# Patient Record
Sex: MALE | Race: WHITE | Employment: FULL TIME | URBAN - METROPOLITAN AREA
[De-identification: names, ages, dates, MRNs, and addresses within clinical notes are randomized per-mention and may not be internally consistent; named-entity substitution may affect disease eponyms.]

---

## 2017-02-03 DIAGNOSIS — I10 ESSENTIAL HYPERTENSION: ICD-10-CM

## 2017-02-03 RX ORDER — LISINOPRIL 5 MG/1
5 TABLET ORAL DAILY
Qty: 90 TABLET | Refills: 0 | Status: SHIPPED | OUTPATIENT
Start: 2017-02-03 | End: 2017-05-12 | Stop reason: SDUPTHER

## 2017-04-07 ENCOUNTER — OFFICE VISIT (OUTPATIENT)
Dept: FAMILY MEDICINE CLINIC | Age: 52
End: 2017-04-07
Payer: COMMERCIAL

## 2017-04-07 VITALS
HEART RATE: 93 BPM | HEIGHT: 71 IN | DIASTOLIC BLOOD PRESSURE: 64 MMHG | WEIGHT: 197 LBS | SYSTOLIC BLOOD PRESSURE: 108 MMHG | RESPIRATION RATE: 20 BRPM | TEMPERATURE: 97.4 F | BODY MASS INDEX: 27.58 KG/M2

## 2017-04-07 DIAGNOSIS — J01.40 ACUTE NON-RECURRENT PANSINUSITIS: Primary | ICD-10-CM

## 2017-04-07 DIAGNOSIS — M25.562 CHRONIC PAIN OF LEFT KNEE: ICD-10-CM

## 2017-04-07 DIAGNOSIS — G89.29 CHRONIC PAIN OF LEFT KNEE: ICD-10-CM

## 2017-04-07 DIAGNOSIS — J20.9 ACUTE BRONCHITIS, UNSPECIFIED ORGANISM: ICD-10-CM

## 2017-04-07 DIAGNOSIS — Z12.11 SCREEN FOR COLON CANCER: ICD-10-CM

## 2017-04-07 PROCEDURE — 99214 OFFICE O/P EST MOD 30 MIN: CPT | Performed by: NURSE PRACTITIONER

## 2017-04-07 RX ORDER — GUAIFENESIN AND CODEINE PHOSPHATE 100; 10 MG/5ML; MG/5ML
5 SOLUTION ORAL EVERY 4 HOURS PRN
Qty: 90 ML | Refills: 0 | Status: SHIPPED | OUTPATIENT
Start: 2017-04-07 | End: 2017-04-14

## 2017-04-07 RX ORDER — PREDNISONE 10 MG/1
TABLET ORAL
Qty: 18 TABLET | Refills: 0 | Status: SHIPPED | OUTPATIENT
Start: 2017-04-07 | End: 2017-04-17

## 2017-04-07 RX ORDER — GUAIFENESIN 600 MG/1
600 TABLET, EXTENDED RELEASE ORAL 2 TIMES DAILY
Qty: 40 TABLET | Refills: 0 | Status: SHIPPED | OUTPATIENT
Start: 2017-04-07 | End: 2017-05-12 | Stop reason: SDUPTHER

## 2017-04-07 RX ORDER — CLARITHROMYCIN 500 MG/1
500 TABLET, COATED ORAL 2 TIMES DAILY
Qty: 20 TABLET | Refills: 0 | Status: SHIPPED | OUTPATIENT
Start: 2017-04-07 | End: 2017-06-05 | Stop reason: SDUPTHER

## 2017-04-09 ASSESSMENT — ENCOUNTER SYMPTOMS
ABDOMINAL PAIN: 0
SINUS PRESSURE: 1
COUGH: 1
NAUSEA: 0
SHORTNESS OF BREATH: 1
RHINORRHEA: 1
VOMITING: 0

## 2017-05-12 ENCOUNTER — OFFICE VISIT (OUTPATIENT)
Dept: FAMILY MEDICINE CLINIC | Age: 52
End: 2017-05-12
Payer: COMMERCIAL

## 2017-05-12 VITALS
TEMPERATURE: 97.3 F | HEART RATE: 83 BPM | DIASTOLIC BLOOD PRESSURE: 85 MMHG | OXYGEN SATURATION: 98 % | SYSTOLIC BLOOD PRESSURE: 132 MMHG | BODY MASS INDEX: 27.28 KG/M2 | WEIGHT: 195.6 LBS

## 2017-05-12 DIAGNOSIS — Z12.11 SCREEN FOR COLON CANCER: ICD-10-CM

## 2017-05-12 DIAGNOSIS — R53.83 FATIGUE, UNSPECIFIED TYPE: ICD-10-CM

## 2017-05-12 DIAGNOSIS — I10 ESSENTIAL HYPERTENSION: Primary | ICD-10-CM

## 2017-05-12 DIAGNOSIS — Z23 NEED FOR PNEUMOCOCCAL VACCINE: ICD-10-CM

## 2017-05-12 DIAGNOSIS — Z13.220 SCREENING CHOLESTEROL LEVEL: ICD-10-CM

## 2017-05-12 DIAGNOSIS — Z11.4 SCREENING FOR HIV (HUMAN IMMUNODEFICIENCY VIRUS): ICD-10-CM

## 2017-05-12 DIAGNOSIS — F17.200 TOBACCO DEPENDENCE: ICD-10-CM

## 2017-05-12 DIAGNOSIS — J42 CHRONIC BRONCHITIS, UNSPECIFIED CHRONIC BRONCHITIS TYPE (HCC): ICD-10-CM

## 2017-05-12 LAB
CONTROL: YES
HEMOCCULT STL QL: NEGATIVE

## 2017-05-12 PROCEDURE — 82274 ASSAY TEST FOR BLOOD FECAL: CPT | Performed by: NURSE PRACTITIONER

## 2017-05-12 PROCEDURE — 99214 OFFICE O/P EST MOD 30 MIN: CPT | Performed by: NURSE PRACTITIONER

## 2017-05-12 RX ORDER — LISINOPRIL 5 MG/1
5 TABLET ORAL DAILY
Qty: 90 TABLET | Refills: 1 | Status: SHIPPED | OUTPATIENT
Start: 2017-05-12 | End: 2017-10-24 | Stop reason: SDUPTHER

## 2017-05-12 RX ORDER — VARENICLINE TARTRATE 25 MG
KIT ORAL
Qty: 53 TABLET | Refills: 0 | Status: SHIPPED | OUTPATIENT
Start: 2017-05-12 | End: 2018-08-08 | Stop reason: ALTCHOICE

## 2017-05-12 RX ORDER — OMEPRAZOLE 20 MG/1
CAPSULE, DELAYED RELEASE ORAL
Qty: 90 CAPSULE | Refills: 1 | Status: SHIPPED | OUTPATIENT
Start: 2017-05-12 | End: 2017-10-24 | Stop reason: SDUPTHER

## 2017-05-14 ASSESSMENT — ENCOUNTER SYMPTOMS
COUGH: 1
SHORTNESS OF BREATH: 0
NAUSEA: 0
ABDOMINAL PAIN: 0

## 2017-06-05 ENCOUNTER — TELEPHONE (OUTPATIENT)
Dept: FAMILY MEDICINE CLINIC | Age: 52
End: 2017-06-05

## 2017-06-05 DIAGNOSIS — J01.40 ACUTE NON-RECURRENT PANSINUSITIS: ICD-10-CM

## 2017-06-05 DIAGNOSIS — J20.9 ACUTE BRONCHITIS, UNSPECIFIED ORGANISM: ICD-10-CM

## 2017-06-05 RX ORDER — CLARITHROMYCIN 500 MG/1
500 TABLET, COATED ORAL 2 TIMES DAILY
Qty: 20 TABLET | Refills: 0 | Status: SHIPPED | OUTPATIENT
Start: 2017-06-05 | End: 2018-09-02 | Stop reason: SDUPTHER

## 2017-06-05 RX ORDER — AZITHROMYCIN 250 MG/1
TABLET, FILM COATED ORAL
Qty: 1 PACKET | Refills: 0 | Status: SHIPPED | OUTPATIENT
Start: 2017-06-05 | End: 2017-06-15

## 2017-06-19 ENCOUNTER — TELEPHONE (OUTPATIENT)
Dept: FAMILY MEDICINE CLINIC | Age: 52
End: 2017-06-19

## 2017-06-20 RX ORDER — ALBUTEROL SULFATE 90 UG/1
2 AEROSOL, METERED RESPIRATORY (INHALATION) 4 TIMES DAILY
Qty: 3 INHALER | Refills: 2 | Status: SHIPPED | OUTPATIENT
Start: 2017-06-20 | End: 2017-09-05 | Stop reason: SDUPTHER

## 2017-09-06 RX ORDER — ALBUTEROL SULFATE 90 UG/1
2 AEROSOL, METERED RESPIRATORY (INHALATION) 4 TIMES DAILY
Qty: 3 INHALER | Refills: 2 | Status: SHIPPED | OUTPATIENT
Start: 2017-09-06 | End: 2018-08-08 | Stop reason: ALTCHOICE

## 2017-10-24 DIAGNOSIS — I10 ESSENTIAL HYPERTENSION: ICD-10-CM

## 2017-10-24 RX ORDER — OMEPRAZOLE 20 MG/1
CAPSULE, DELAYED RELEASE ORAL
Qty: 90 CAPSULE | Refills: 1 | Status: SHIPPED | OUTPATIENT
Start: 2017-10-24 | End: 2018-05-30 | Stop reason: SDUPTHER

## 2017-10-24 RX ORDER — LISINOPRIL 5 MG/1
5 TABLET ORAL DAILY
Qty: 90 TABLET | Refills: 1 | Status: SHIPPED | OUTPATIENT
Start: 2017-10-24 | End: 2018-05-30 | Stop reason: SDUPTHER

## 2018-01-26 ENCOUNTER — OFFICE VISIT (OUTPATIENT)
Dept: PODIATRY | Age: 53
End: 2018-01-26
Payer: COMMERCIAL

## 2018-01-26 VITALS — HEIGHT: 71 IN | HEART RATE: 103 BPM | SYSTOLIC BLOOD PRESSURE: 122 MMHG | DIASTOLIC BLOOD PRESSURE: 83 MMHG

## 2018-01-26 DIAGNOSIS — L98.9 BENIGN SKIN LESION: ICD-10-CM

## 2018-01-26 DIAGNOSIS — B07.0 PLANTAR VERRUCA: Primary | ICD-10-CM

## 2018-01-26 DIAGNOSIS — M79.672 LEFT FOOT PAIN: ICD-10-CM

## 2018-01-26 DIAGNOSIS — M79.671 RIGHT FOOT PAIN: ICD-10-CM

## 2018-01-26 PROCEDURE — 4004F PT TOBACCO SCREEN RCVD TLK: CPT | Performed by: PODIATRIST

## 2018-01-26 PROCEDURE — 99203 OFFICE O/P NEW LOW 30 MIN: CPT | Performed by: PODIATRIST

## 2018-01-26 PROCEDURE — G8421 BMI NOT CALCULATED: HCPCS | Performed by: PODIATRIST

## 2018-01-26 PROCEDURE — 3017F COLORECTAL CA SCREEN DOC REV: CPT | Performed by: PODIATRIST

## 2018-01-26 PROCEDURE — 17110 DESTRUCTION B9 LES UP TO 14: CPT | Performed by: PODIATRIST

## 2018-01-26 PROCEDURE — G8427 DOCREV CUR MEDS BY ELIG CLIN: HCPCS | Performed by: PODIATRIST

## 2018-01-26 PROCEDURE — G8484 FLU IMMUNIZE NO ADMIN: HCPCS | Performed by: PODIATRIST

## 2018-01-26 ASSESSMENT — ENCOUNTER SYMPTOMS
NAUSEA: 0
SHORTNESS OF BREATH: 0
DIARRHEA: 0
BACK PAIN: 0
COLOR CHANGE: 0

## 2018-01-26 NOTE — PROGRESS NOTES
by mouth every 6 hours as needed for Pain. 9/22/14  Yes Magdalena Adan MD   fluticasone (FLONASE) 50 MCG/ACT nasal spray 1 spray by Nasal route daily. 4/9/14  Yes Macky Landau, DO   varenicline (CHANTIX STARTING MONTH PAK) 0.5 MG X 11 & 1 MG X 42 tablet Take by mouth. 5/12/17   Meron Andrade, RONIT   guaiFENesin (MUCINEX) 600 MG SR tablet Take 1 tablet by mouth 2 times daily. 2/18/15   Pérez Joy MD       History reviewed. No pertinent surgical history. History reviewed. No pertinent family history. Social History   Substance Use Topics    Smoking status: Current Every Day Smoker     Packs/day: 0.50     Years: 20.00     Types: Cigarettes    Smokeless tobacco: Never Used    Alcohol use 0.0 oz/week      Comment: 8 beers per day       Review of Systems   Constitutional: Negative for activity change, appetite change, chills, diaphoresis, fatigue and fever. Respiratory: Negative for shortness of breath. Cardiovascular: Negative for leg swelling. Gastrointestinal: Negative for diarrhea and nausea. Endocrine: Negative for cold intolerance, heat intolerance and polyuria. Musculoskeletal: Positive for arthralgias. Negative for back pain, gait problem, joint swelling and myalgias. Skin: Negative for color change, pallor, rash and wound. Allergic/Immunologic: Negative for environmental allergies and food allergies. Neurological: Negative for dizziness, weakness, light-headedness and numbness. Hematological: Does not bruise/bleed easily. Psychiatric/Behavioral: Negative for behavioral problems, confusion and self-injury. The patient is not nervous/anxious. Vitals:   Vitals:    01/26/18 1256   BP: 122/83   Pulse: 103       General: AAO x 3 in NAD. Integument: There are no rashes, ulcers, or breaks in the skin noted to the bilateral lower extremities.      There is no induration, subcutaneous nodules, or tightening of the skin noted to the bilateral.     Toenails 1-5 of the right foot do not

## 2018-04-30 ENCOUNTER — OFFICE VISIT (OUTPATIENT)
Dept: PODIATRY | Age: 53
End: 2018-04-30
Payer: COMMERCIAL

## 2018-04-30 VITALS
BODY MASS INDEX: 27.44 KG/M2 | WEIGHT: 196 LBS | SYSTOLIC BLOOD PRESSURE: 137 MMHG | HEART RATE: 80 BPM | HEIGHT: 71 IN | DIASTOLIC BLOOD PRESSURE: 81 MMHG

## 2018-04-30 DIAGNOSIS — M79.672 LEFT FOOT PAIN: ICD-10-CM

## 2018-04-30 DIAGNOSIS — B07.0 PLANTAR VERRUCA: Primary | ICD-10-CM

## 2018-04-30 DIAGNOSIS — L84 CORNS AND CALLOSITIES: ICD-10-CM

## 2018-04-30 DIAGNOSIS — M79.671 RIGHT FOOT PAIN: ICD-10-CM

## 2018-04-30 PROCEDURE — 11056 PARNG/CUTG B9 HYPRKR LES 2-4: CPT | Performed by: PODIATRIST

## 2018-04-30 PROCEDURE — 17110 DESTRUCTION B9 LES UP TO 14: CPT | Performed by: PODIATRIST

## 2018-04-30 RX ORDER — IPRATROPIUM BROMIDE AND ALBUTEROL SULFATE 2.5; .5 MG/3ML; MG/3ML
SOLUTION RESPIRATORY (INHALATION)
COMMUNITY
Start: 2018-03-25 | End: 2018-08-08 | Stop reason: SDUPTHER

## 2018-04-30 ASSESSMENT — ENCOUNTER SYMPTOMS
SHORTNESS OF BREATH: 0
COLOR CHANGE: 0
DIARRHEA: 0
BACK PAIN: 0
NAUSEA: 0

## 2018-05-30 DIAGNOSIS — K21.9 GASTROESOPHAGEAL REFLUX DISEASE WITHOUT ESOPHAGITIS: Primary | ICD-10-CM

## 2018-05-30 DIAGNOSIS — I10 ESSENTIAL HYPERTENSION: ICD-10-CM

## 2018-05-30 RX ORDER — OMEPRAZOLE 20 MG/1
CAPSULE, DELAYED RELEASE ORAL
Qty: 90 CAPSULE | Refills: 1 | Status: SHIPPED | OUTPATIENT
Start: 2018-05-30 | End: 2018-12-05 | Stop reason: SDUPTHER

## 2018-05-30 RX ORDER — LISINOPRIL 5 MG/1
5 TABLET ORAL DAILY
Qty: 90 TABLET | Refills: 1 | Status: SHIPPED | OUTPATIENT
Start: 2018-05-30 | End: 2018-12-05 | Stop reason: SDUPTHER

## 2018-06-25 DIAGNOSIS — J20.9 ACUTE BRONCHITIS, UNSPECIFIED ORGANISM: ICD-10-CM

## 2018-06-25 DIAGNOSIS — J44.9 CHRONIC OBSTRUCTIVE PULMONARY DISEASE, UNSPECIFIED COPD TYPE (HCC): Primary | ICD-10-CM

## 2018-06-25 NOTE — TELEPHONE ENCOUNTER
Please let the patient know to  prescription from pharmacy. Requested Prescriptions     Signed Prescriptions Disp Refills    mometasone-formoterol (DULERA) 200-5 MCG/ACT inhaler 13 g 3     Sig: Inhale 2 puffs into the lungs 2 times daily     Authorizing Provider: Rashid Meyer [address] [facility], [address] [place] [address]  Phone: [phone] Fax: [phone]      Thank you!         FYI    Future Appointments  Date Time Provider Sha Porter   8/8/2018 4:30 PM Sahara Nettles MD fp sc [id number]       Controlled Substances Monitoring:

## 2018-06-25 NOTE — TELEPHONE ENCOUNTER
Patient made soonest apt with you, 8/8. He is hoping you can give him refill on his Rayma Ryan until he sees you. Please advise.

## 2018-08-08 ENCOUNTER — OFFICE VISIT (OUTPATIENT)
Dept: FAMILY MEDICINE CLINIC | Age: 53
End: 2018-08-08
Payer: COMMERCIAL

## 2018-08-08 VITALS
BODY MASS INDEX: 27.66 KG/M2 | HEART RATE: 74 BPM | DIASTOLIC BLOOD PRESSURE: 88 MMHG | TEMPERATURE: 97.2 F | HEIGHT: 71 IN | SYSTOLIC BLOOD PRESSURE: 138 MMHG | OXYGEN SATURATION: 97 % | WEIGHT: 197.6 LBS

## 2018-08-08 DIAGNOSIS — M25.469 PAIN AND SWELLING OF KNEE, UNSPECIFIED LATERALITY: ICD-10-CM

## 2018-08-08 DIAGNOSIS — G89.29 CHRONIC PAIN OF BOTH KNEES: Primary | ICD-10-CM

## 2018-08-08 DIAGNOSIS — H83.3X1 NOISE-INDUCED HEARING LOSS OF RIGHT EAR, UNSPECIFIED HEARING STATUS ON CONTRALATERAL SIDE: ICD-10-CM

## 2018-08-08 DIAGNOSIS — M25.561 CHRONIC PAIN OF BOTH KNEES: Primary | ICD-10-CM

## 2018-08-08 DIAGNOSIS — Z13.6 SCREENING FOR CARDIOVASCULAR CONDITION: ICD-10-CM

## 2018-08-08 DIAGNOSIS — J44.0 CHRONIC OBSTRUCTIVE PULMONARY DISEASE WITH ACUTE LOWER RESPIRATORY INFECTION (HCC): ICD-10-CM

## 2018-08-08 DIAGNOSIS — G57.93 NEUROPATHY OF BOTH FEET: ICD-10-CM

## 2018-08-08 DIAGNOSIS — Z12.11 SCREENING FOR COLON CANCER: ICD-10-CM

## 2018-08-08 DIAGNOSIS — M25.569 PAIN AND SWELLING OF KNEE, UNSPECIFIED LATERALITY: ICD-10-CM

## 2018-08-08 DIAGNOSIS — Z11.4 ENCOUNTER FOR SCREENING FOR HIV: ICD-10-CM

## 2018-08-08 DIAGNOSIS — J44.9 COPD WITH ASTHMA (HCC): ICD-10-CM

## 2018-08-08 DIAGNOSIS — M25.562 CHRONIC PAIN OF BOTH KNEES: Primary | ICD-10-CM

## 2018-08-08 DIAGNOSIS — I10 ESSENTIAL HYPERTENSION: ICD-10-CM

## 2018-08-08 DIAGNOSIS — Z87.891 PERSONAL HISTORY OF NICOTINE DEPENDENCE: ICD-10-CM

## 2018-08-08 PROCEDURE — G8427 DOCREV CUR MEDS BY ELIG CLIN: HCPCS | Performed by: FAMILY MEDICINE

## 2018-08-08 PROCEDURE — G8926 SPIRO NO PERF OR DOC: HCPCS | Performed by: FAMILY MEDICINE

## 2018-08-08 PROCEDURE — 3023F SPIROM DOC REV: CPT | Performed by: FAMILY MEDICINE

## 2018-08-08 PROCEDURE — G8419 CALC BMI OUT NRM PARAM NOF/U: HCPCS | Performed by: FAMILY MEDICINE

## 2018-08-08 PROCEDURE — 99214 OFFICE O/P EST MOD 30 MIN: CPT | Performed by: FAMILY MEDICINE

## 2018-08-08 PROCEDURE — 4004F PT TOBACCO SCREEN RCVD TLK: CPT | Performed by: FAMILY MEDICINE

## 2018-08-08 PROCEDURE — 3017F COLORECTAL CA SCREEN DOC REV: CPT | Performed by: FAMILY MEDICINE

## 2018-08-08 RX ORDER — ACETAMINOPHEN 500 MG
500 TABLET ORAL EVERY 6 HOURS PRN
Qty: 120 TABLET | Refills: 3 | Status: SHIPPED | OUTPATIENT
Start: 2018-08-08 | End: 2020-01-15 | Stop reason: SDUPTHER

## 2018-08-08 RX ORDER — LIDOCAINE 40 MG/G
CREAM TOPICAL
Qty: 120 G | Refills: 3 | Status: SHIPPED | OUTPATIENT
Start: 2018-08-08 | End: 2020-07-29

## 2018-08-08 RX ORDER — VARENICLINE TARTRATE 1 MG/1
1 TABLET, FILM COATED ORAL 2 TIMES DAILY
Qty: 60 TABLET | Refills: 3 | Status: SHIPPED | OUTPATIENT
Start: 2018-08-08 | End: 2019-03-04

## 2018-08-08 RX ORDER — IPRATROPIUM BROMIDE AND ALBUTEROL SULFATE 2.5; .5 MG/3ML; MG/3ML
1 SOLUTION RESPIRATORY (INHALATION) EVERY 6 HOURS PRN
Qty: 360 ML | Refills: 3 | Status: SHIPPED | OUTPATIENT
Start: 2018-08-08 | End: 2020-01-15 | Stop reason: SDUPTHER

## 2018-08-08 RX ORDER — VARENICLINE TARTRATE 25 MG
KIT ORAL
Qty: 53 EACH | Refills: 0 | Status: SHIPPED | OUTPATIENT
Start: 2018-08-08 | End: 2019-03-04

## 2018-08-08 RX ORDER — ALBUTEROL SULFATE 90 UG/1
2 AEROSOL, METERED RESPIRATORY (INHALATION) EVERY 6 HOURS PRN
Qty: 18 G | Refills: 0
Start: 2018-08-08 | End: 2018-10-01 | Stop reason: SDUPTHER

## 2018-08-08 ASSESSMENT — ENCOUNTER SYMPTOMS
SHORTNESS OF BREATH: 1
CHEST TIGHTNESS: 0
ABDOMINAL DISTENTION: 0
ABDOMINAL PAIN: 0
CONSTIPATION: 0
COUGH: 1
VOMITING: 0
DIARRHEA: 0
NAUSEA: 0
WHEEZING: 1

## 2018-08-08 ASSESSMENT — PATIENT HEALTH QUESTIONNAIRE - PHQ9
2. FEELING DOWN, DEPRESSED OR HOPELESS: 0
SUM OF ALL RESPONSES TO PHQ9 QUESTIONS 1 & 2: 0
SUM OF ALL RESPONSES TO PHQ QUESTIONS 1-9: 0
SUM OF ALL RESPONSES TO PHQ QUESTIONS 1-9: 0
1. LITTLE INTEREST OR PLEASURE IN DOING THINGS: 0

## 2018-08-08 NOTE — PROGRESS NOTES
Visit Information    Have you changed or started any medications since your last visit including any over-the-counter medicines, vitamins, or herbal medicines? no   Are you having any side effects from any of your medications? -  no  Have you stopped taking any of your medications? Is so, why? -  no    Have you seen any other physician or provider since your last visit? No  Have you had any other diagnostic tests since your last visit? No  Have you been seen in the emergency room and/or had an admission to a hospital since we last saw you? No  Have you had your routine dental cleaning in the past 6 months? no    Have you activated your [facility] account? If not, what are your barriers?  Yes     Patient Care Team:  Danette Poe MD as PCP - General (Family Medicine)  JO Stacy CNP as PCP - S Attributed Provider    Medical History Review  Past Medical, Family, and Social History reviewed and does contribute to the patient presenting condition    Health Maintenance   Topic Date Due    HIV screen  10/17/1981    DTaP/Tdap/Td vaccine (1 - Tdap) 10/17/1985    Lipid screen  10/17/2006    Diabetes screen  10/17/2006    Potassium monitoring  09/23/2014    Creatinine monitoring  09/23/2014    Shingles Vaccine (1 of 2 - 2 Dose Series) 10/17/2016    Colon Cancer Screen FIT/FOBT  05/12/2018    Flu vaccine (1) 09/01/2018    Pneumococcal med risk  Completed

## 2018-08-08 NOTE — PROGRESS NOTES
Chief Complaint   Patient presents with    Established New Doctor    Asthma    Hypertension    Knee Pain    Other     hcc gap: copd        Here to establish care. Prior PCP: Mrs Tone Powell is a 47 y.o. male who presents to the office today for a first visit and to establish a relationship with a new primary care physician. Today, the patient complains of : Established New Doctor; Asthma; Hypertension; Knee Pain; and Other (hcc gap: copd)    Knee Pain: Patient complains of bilateral knee pain. This is evaluated as a no injury. The pain began several years ago, but this flare up started a few months ago. The pain is located global.  He describes the symptoms as aching. Symptoms improve with rest. The symptoms are worse with activity, stair climbing, deep knee bending, getting up from a chair. The knee has not given out or felt unstable. The patient cannot bend and straighten the knee fully. Treatment to date has been Tylenol, NSAID's, knee sleeve/brace, without significant relief. He did have a knee brace many years ago  and helped a little, but was falling. Has associated stiffness  Says he cannot lift up his legs on the steps. Reports his feet are going numb and has decreased feeling in his feet, onset a few months ago, progressively getting worse. He admits to drinking about 6 beers at a time, about 2 days/week    COPD-Asthma   Patient says he has asthma for a long time, since a child. He was also diagnosed as COPD. Patient reports dyspnea when smoking  He has associated dry cough  Has been Wheezing at nighttime. Denies hemoptysis or chest pain  Uses Dulera inhaler. He says he has a nebulizer machine but he doesn't have anything to put is it. He has Ventolin as a rescue inhaler. Nicotine dependence. Smoker, under 1/2 PPD x 1 mo, was smoking 1.5 PPD before, counseling given to quit smoking. Has been smoking for 31 yrs. Agrees to try Chantix.     Ready to quit: Yes  Counseling given: Yes        Hypertension: Patient here for follow-up of elevated blood pressure. he   is walking a lot at work, not exercising and is adherent to low salt diet. . Cardiac symptoms dyspnea and fatigue. Patient denies chest pain, chest pressure/discomfort, claudication, exertional chest pressure/discomfort, irregular heart beat, lower extremity edema, near-syncope, orthopnea, palpitations, paroxysmal nocturnal dyspnea, syncope and tachypnea. Cardiovascular risk factors: hypertension, male gender and smoking/ tobacco exposure. Use of agents associated with hypertension: NSAIDS. History of target organ damage: none. BP controlled. Rosalva Way reports compliance with BP medications, and tolerates them well, denies side effects. BP Readings from Last 3 Encounters:   08/08/18 138/88   04/30/18 137/81   01/26/18 122/83      Pulse controlled. Pulse Readings from Last 3 Encounters:   08/08/18 74   04/30/18 80   01/26/18 103     Weight has been stable    Wt Readings from Last 3 Encounters:   08/08/18 197 lb 9.6 oz (89.6 kg)   04/30/18 196 lb (88.9 kg)   05/12/17 195 lb 9.6 oz (88.7 kg)           Anastacio reports he did have issues with Right ear pain after exposure to loud noise at work. He felt that \"it popped \" . It happened  1 mo ago. He has hearing loss in the right ear.      -vital signs stable and within normal limits except Overweight per BMI. /88   Pulse 74   Temp 97.2 °F (36.2 °C) (Tympanic)   Ht 5' 11\" (1.803 m)   Wt 197 lb 9.6 oz (89.6 kg)   SpO2 97% Comment: resting @ RA  BMI 27.56 kg/m²   Body mass index is 27.56 kg/m².    -rest of complaints with corresponding details per ROS      Negative depression screening.    PHQ Scores 8/8/2018   PHQ2 Score 0   PHQ9 Score 0     Interpretation of Total Score Depression Severity: 1-4 = Minimal depression, 5-9 = Mild depression, 10-14 = Moderate depression, 15-19 = Moderately severe depression, 20-27 = Severe depression        Current Types: Cigarettes    Smokeless tobacco: Never Used    Alcohol use 7.2 oz/week     12 Cans of beer per week      Comment: 6 beers per day    Drug use: No    Sexual activity: Yes     Partners: Female     Other Topics Concern    Not on file     Social History Narrative    No narrative on file       Quality & Risk Score Accuracy    Visit Dx:  J44.0 - Chronic obstructive pulmonary disease with acute lower respiratory infection ([facility] [address]  Assessment and plan:  Stable based upon symptoms and exam. Continue current treatment plan and follow up at least yearly. Last edited 08/08/18 22:09 EDT by Macho Javed MD               The patient's past medical, surgical, social, and family history as well as his current medications and allergies were reviewed as documented in today's encounter. Review of Systems   Constitutional: Positive for fatigue. Negative for activity change, appetite change, chills, diaphoresis, fever and unexpected weight change. HENT: Positive for hearing loss (right earx 1 mo). Respiratory: Positive for cough (dry), shortness of breath (ANDERSON) and wheezing. Negative for chest tightness. Cardiovascular: Negative for chest pain, palpitations and leg swelling. Gastrointestinal: Negative for abdominal distention, abdominal pain, constipation, diarrhea, nausea and vomiting. Endocrine: Negative for cold intolerance, heat intolerance, polydipsia, polyphagia and polyuria. Musculoskeletal: Positive for arthralgias (knees), gait problem and joint swelling (knees ). Neurological: Positive for numbness (feet). Psychiatric/Behavioral: Negative for dysphoric mood. The patient is not nervous/anxious.          -vital signs stable and within normal limits except Overweight per BMI.    /88   Pulse 74   Temp 97.2 °F (36.2 °C) (Tympanic)   Ht 5' 11\" (1.803 m)   Wt 197 lb 9.6 oz (89.6 kg)   SpO2 97%   BMI 27.56 kg/m²        Physical Exam   Constitutional: He is oriented to person, place, and time. He appears well-developed and well-nourished. No distress. HENT:   Head: Normocephalic and atraumatic. Right Ear: Tympanic membrane is perforated. Decreased hearing is noted. Left Ear: No decreased hearing is noted. Ears:    Mouth/Throat: Oropharynx is clear and moist.   Eyes: Conjunctivae and EOM are normal. Right eye exhibits no discharge. Left eye exhibits no discharge. No scleral icterus. Neck: Normal range of motion. Neck supple. No thyromegaly present. Cardiovascular: Normal rate, regular rhythm, normal heart sounds and intact distal pulses. No murmur heard. Pulmonary/Chest: Effort normal. No respiratory distress. He has decreased breath sounds in the right lower field and the left lower field. He has wheezes in the right middle field and the left middle field. He has no rales. He exhibits no tenderness. Abdominal: Soft. Bowel sounds are normal. He exhibits no distension. There is no tenderness. Musculoskeletal: He exhibits tenderness. He exhibits no edema. Right knee: He exhibits decreased range of motion and swelling. Tenderness found. Medial joint line, lateral joint line and patellar tendon tenderness noted. Left knee: He exhibits decreased range of motion and swelling. Tenderness found. Medial joint line, lateral joint line and patellar tendon tenderness noted. Coarse crepitus bilateral knees, the knees feel warm, bilaterally, antalgic flexion extension, antalgic walking noted   Neurological: He is alert and oriented to person, place, and time. No cranial nerve deficit. He exhibits normal muscle tone. Coordination normal.   Skin: Skin is warm and dry. No rash noted. He is not diaphoretic. Psychiatric: His behavior is normal. Judgment and thought content normal. His mood appears anxious. Nursing note and vitals reviewed.       Most recent labs reviewed with patient:  Old labs, within normal limits   Lab Results   Component Value Date    WBC 6.1 09/23/2013    HGB 14.8 09/23/2013    HCT 44.0 09/23/2013    MCV 92.0 09/23/2013     09/23/2013       Lab Results   Component Value Date     09/23/2013    K 4.5 09/23/2013     09/23/2013    CO2 29 09/23/2013    BUN 14 09/23/2013    CREATININE 1.08 09/23/2013    GLUCOSE 88 09/23/2013    CALCIUM 9.8 09/23/2013        Lab Results   Component Value Date    ALT 26 09/23/2013    AST 25 09/23/2013    ALKPHOS 96 09/23/2013    BILITOT 0.59 09/23/2013       No results found for: TSHFT4, TSH    No results found for: CHOL  No results found for: TRIG  No results found for: HDL  No results found for: LDLCALC, LDLCHOLESTEROL  No results found for: LABVLDL, VLDL  No results found for: CHOLHDLRATIO      No results found for: OSCWSRLR02  No results found for: FOLATE  No results found for: VITD25    ASSESSMENT AND PLAN      1. Chronic pain of both knees  - Uric Acid; Future  - acetaminophen (APAP EXTRA STRENGTH) 500 MG tablet; Take 1 tablet by mouth every 6 hours as needed for Pain  Dispense: 120 tablet; Refill: 3  - XR KNEE LEFT (3 VIEWS); Future  - XR KNEE RIGHT (3 VIEWS); Future  - diclofenac sodium (VOLTAREN) 1 % GEL; Apply 2 g topically 4 times daily as needed for Pain  Dispense: 1 Tube; Refill: 3  - lidocaine (LMX) 4 % cream; Apply 2-3 times a day as needed for pain  Dispense: 120 g; Refill: 3  - Elastic Bandages & Supports (KNEE BRACE/FLEX STAYS LARGE) MISC; bilateral knee pain and swelling  Dispense: 2 each; Refill: 0  Needs knee brace bilateral   Highly suspected osteoarthritis vs gout      2. COPD with asthma ([address]  Stable  Declines PFTs  Smoking cessation counseling given. Continue Dulera  - XR CHEST STANDARD (2 VW); Future  -restart ipratropium-albuterol (DUONEB) 0.5-2.5 (3) MG/3ML SOLN nebulizer solution;  Take 3 mLs by nebulization every 6 hours as needed for Shortness of Breath or Other (dyspnea, wheezing)  Dispense: 360 mL; Refill: 3  - varenicline (CHANTIX STARTING MONTH SELINA) 0.5 MG X 11 & 1 MG X 42 tablet; 0.5 mg po daily x 3 days, 0.5 mg BID x 4 days, then 1 mg BID thereafter . Call for refill  Dispense: 53 each; Refill: 0  - varenicline (CHANTIX CONTINUING MONTH SELINA) 1 MG tablet; Take 1 tablet by mouth 2 times daily  Dispense: 60 tablet; Refill: 3  - albuterol sulfate HFA (VENTOLIN HFA) 108 (90 Base) MCG/ACT inhaler; Inhale 2 puffs into the lungs every 6 hours as needed for Wheezing or Shortness of Breath  Dispense: 18 g; Refill: 0    3. Essential hypertension  Controlled  Discussed low salt diet and BP and pulse monitoring daily, BP log given  Continue current treatment. - CBC; Future  - Comprehensive Metabolic Panel; Future  - TSH without Reflex; Future    4. Neuropathy of both feet  New   Could be related to alcohol   - CBC; Future  - Comprehensive Metabolic Panel; Future  - TSH without Reflex; Future  - Vitamin B12 & Folate; Future    5. Pain and swelling of knee, unspecified laterality, bilateral     - Uric Acid; Future  - acetaminophen (APAP EXTRA STRENGTH) 500 MG tablet; Take 1 tablet by mouth every 6 hours as needed for Pain  Dispense: 120 tablet; Refill: 3  - XR KNEE LEFT (3 VIEWS); Future  - XR KNEE RIGHT (3 VIEWS); Future  - diclofenac sodium (VOLTAREN) 1 % GEL; Apply 2 g topically 4 times daily as needed for Pain  Dispense: 1 Tube; Refill: 3  - lidocaine (LMX) 4 % cream; Apply 2-3 times a day as needed for pain  Dispense: 120 g; Refill: 3  - Elastic Bandages & Supports (KNEE BRACE/FLEX STAYS LARGE) MISC; bilateral knee pain and swelling  Dispense: 2 each; Refill: 0    6. Screening for colon cancer    - POCT FECAL IMMUNOCHEMICAL TEST (FIT); Future    7. Encounter for screening for HIV    - HIV-1 And HIV-2 Antibodies; Future    8. Screening for cardiovascular condition    - Lipid Panel; Future    9.  Noise-induced hearing loss of right ear, unspecified hearing status on contralateral side  Seems that he he has a small perforation and hearing loss   - [facility] ENT Physician, [facility], [place], & Folate     Standing Status:   Future     Standing Expiration Date:   12/8/2018    Lipid Panel     Standing Status:   Future     Standing Expiration Date:   12/8/2018     Order Specific Question:   Is Patient Fasting?/# of Hours     Answer:   8-10 Hours    [facility] ENT Physician, [address], [place]     Referral Priority:   Routine     Referral Type:   Eval and Treat     Referral Reason:   Specialty Services Required     Referred to Provider:   Azra Edwards     Requested Specialty:   Audiology     Number of Visits Requested:   1    POCT FECAL IMMUNOCHEMICAL TEST (FIT)     Standing Status:   Future     Standing Expiration Date:   12/8/2018         Medications Discontinued During This Encounter   Medication Reason    albuterol (PROVENTIL) (2.5 MG/3ML) 0.083% nebulizer solution Alternate therapy    varenicline (CHANTIX STARTING MONTH PAK) 0.5 MG X 11 & 1 MG X 42 tablet Therapy completed    guaiFENesin (MUCINEX) 600 MG SR tablet LIST CLEANUP    albuterol sulfate HFA (PROAIR HFA) 108 (90 Base) MCG/ACT inhaler Alternate therapy    sildenafil (VIAGRA) 50 MG tablet LIST CLEANUP    fluticasone (FLONASE) 50 MCG/ACT nasal spray Therapy completed    ipratropium-albuterol (DUONEB) 0.5-2.5 (3) MG/3ML SOLN nebulizer solution REORDER    acetaminophen (APAP EXTRA STRENGTH) 500 MG tablet REORDER         Anastacio received counseling on the following healthy behaviors: nutrition, exercise, medication adherence, tobacco cessation and decrease in alcohol consumption  Reviewed prior labs and health maintenance  Continue current medications, diet and exercise. Discussed use, benefit, and side effects of prescribed medications. Barriers to medication compliance addressed. Patient given educational materials - see patient instructions  Was a self-tracking handout given in paper form or via [employer]?  Yes    Requested Prescriptions     Signed Prescriptions Disp Refills    ipratropium-albuterol (DUONEB) 0.5-2.5 (3) MG/3ML SOLN nebulizer solution 360 mL 3     Sig: Take 3 mLs by nebulization every 6 hours as needed for Shortness of Breath or Other (dyspnea, wheezing)    acetaminophen (APAP EXTRA STRENGTH) 500 MG tablet 120 tablet 3     Sig: Take 1 tablet by mouth every 6 hours as needed for Pain    diclofenac sodium (VOLTAREN) 1 % GEL 1 Tube 3     Sig: Apply 2 g topically 4 times daily as needed for Pain    lidocaine (LMX) 4 % cream 120 g 3     Sig: Apply 2-3 times a day as needed for pain    Elastic Bandages & Supports (KNEE BRACE/FLEX STAYS LARGE) MISC 2 each 0     Sig: bilateral knee pain and swelling    varenicline (CHANTIX STARTING MONTH ) 0.5 MG X 11 & 1 MG X 42 tablet 53 each 0     Si.5 mg po daily x 3 days, 0.5 mg BID x 4 days, then 1 mg BID thereafter . Call for refill    varenicline (CHANTIX CONTINUING MONTH SELINA) 1 MG tablet 60 tablet 3     Sig: Take 1 tablet by mouth 2 times daily    albuterol sulfate HFA (VENTOLIN HFA) 108 (90 Base) MCG/ACT inhaler 18 g 0     Sig: Inhale 2 puffs into the lungs every 6 hours as needed for Wheezing or Shortness of Breath       All patient questions answered. Patient voiced understanding. Quality Measures    Body mass index is 27.56 kg/m². Elevated. Weight control planned discussed conventional weight loss and Healthy diet and regular exercise. BP: 138/88 Blood pressure is normal. Treatment plan consists of Weight Reduction, DASH Eating Plan, Dietary Sodium Restriction, Increased Physical Activity, Moderation in Alcohol Consumption, Avoid Tobacco and Second-hand Smoke, Patient In-home Blood Pressure Monitoring and No treatment change needed. No results found for: LDLCALC, LDLCHOLESTEROL, LDLDIRECT (goal LDL reduction with dx if diabetes is 50% LDL reduction)        Negative depression screening.    PHQ Scores 2018   PHQ2 Score 0   PHQ9 Score 0     Interpretation of Total Score Depression Severity: 1-4 = Minimal depression, 5-9 = Mild depression, 10-14 = Moderate depression, 15-19 = Moderately severe depression, 20-27 = Severe depression      The patient's past medical, surgical, social, and family history as well as his   current medications and allergies were reviewed as documented in today's encounter. Medications, labs, diagnostic studies, consultations and follow-up as documented in this encounter. Return in about 8 weeks (around 10/3/2018) for HTN, LABS F/U, COPD, ASTHMA, O2, knee pains. Patient was seen with total face to face time of  25 minutes. More than 50% of this visit was counseling and education. Future Appointments  Date Time Provider Sha Porter   10/10/2018 3:45 PM [facility]       This note was completed by using the assistance of a speech-recognition program. However, inadvertent computerized transcription errors may be present. Although every effort was made to ensure accuracy, no guarantees can be provided that every mistake has been identified and corrected by editing.   Electronically signed by Neil Phan MD on 8/8/2018 at 10:09 PM

## 2018-08-09 ENCOUNTER — TELEPHONE (OUTPATIENT)
Dept: FAMILY MEDICINE CLINIC | Age: 53
End: 2018-08-09

## 2018-08-09 DIAGNOSIS — M25.469 PAIN AND SWELLING OF KNEE, UNSPECIFIED LATERALITY: ICD-10-CM

## 2018-08-09 DIAGNOSIS — G89.29 CHRONIC PAIN OF BOTH KNEES: ICD-10-CM

## 2018-08-09 DIAGNOSIS — M25.569 PAIN AND SWELLING OF KNEE, UNSPECIFIED LATERALITY: ICD-10-CM

## 2018-08-09 DIAGNOSIS — M25.562 CHRONIC PAIN OF BOTH KNEES: ICD-10-CM

## 2018-08-09 DIAGNOSIS — M25.561 CHRONIC PAIN OF BOTH KNEES: ICD-10-CM

## 2018-08-10 ENCOUNTER — TELEPHONE (OUTPATIENT)
Dept: FAMILY MEDICINE CLINIC | Age: 53
End: 2018-08-10

## 2018-08-28 ENCOUNTER — HOSPITAL ENCOUNTER (OUTPATIENT)
Age: 53
Discharge: HOME | End: 2018-08-28
Payer: COMMERCIAL

## 2018-08-28 ENCOUNTER — HOSPITAL ENCOUNTER (OUTPATIENT)
Age: 53
Discharge: HOME | End: 2018-08-30
Payer: COMMERCIAL

## 2018-08-28 ENCOUNTER — HOSPITAL ENCOUNTER (OUTPATIENT)
Dept: GENERAL RADIOLOGY | Age: 53
Discharge: HOME | End: 2018-08-30
Payer: COMMERCIAL

## 2018-08-28 DIAGNOSIS — G57.93 NEUROPATHY OF BOTH FEET: ICD-10-CM

## 2018-08-28 DIAGNOSIS — G89.29 CHRONIC PAIN OF BOTH KNEES: ICD-10-CM

## 2018-08-28 DIAGNOSIS — I10 ESSENTIAL HYPERTENSION: ICD-10-CM

## 2018-08-28 DIAGNOSIS — M25.562 CHRONIC PAIN OF BOTH KNEES: ICD-10-CM

## 2018-08-28 DIAGNOSIS — Z11.4 ENCOUNTER FOR SCREENING FOR HIV: ICD-10-CM

## 2018-08-28 DIAGNOSIS — M25.469 PAIN AND SWELLING OF KNEE, UNSPECIFIED LATERALITY: ICD-10-CM

## 2018-08-28 DIAGNOSIS — Z13.6 SCREENING FOR CARDIOVASCULAR CONDITION: ICD-10-CM

## 2018-08-28 DIAGNOSIS — M25.569 PAIN AND SWELLING OF KNEE, UNSPECIFIED LATERALITY: ICD-10-CM

## 2018-08-28 DIAGNOSIS — M25.561 CHRONIC PAIN OF BOTH KNEES: ICD-10-CM

## 2018-08-28 DIAGNOSIS — J44.9 COPD WITH ASTHMA (HCC): ICD-10-CM

## 2018-08-28 LAB
ALBUMIN SERPL-MCNC: 4.2 G/DL (ref 3.5–5.2)
ALBUMIN/GLOBULIN RATIO: NORMAL (ref 1–2.5)
ALP BLD-CCNC: 86 U/L (ref 40–129)
ALT SERPL-CCNC: 22 U/L (ref 5–41)
ANION GAP SERPL CALCULATED.3IONS-SCNC: 12 MMOL/L (ref 9–17)
AST SERPL-CCNC: 23 U/L
BILIRUB SERPL-MCNC: 0.47 MG/DL (ref 0.3–1.2)
BUN BLDV-MCNC: 14 MG/DL (ref 6–20)
BUN/CREAT BLD: NORMAL (ref 9–20)
CALCIUM SERPL-MCNC: 9.3 MG/DL (ref 8.6–10.4)
CHLORIDE BLD-SCNC: 102 MMOL/L (ref 98–107)
CHOLESTEROL/HDL RATIO: 1.6
CHOLESTEROL: 162 MG/DL
CO2: 24 MMOL/L (ref 20–31)
CREAT SERPL-MCNC: 0.95 MG/DL (ref 0.7–1.2)
FOLATE: 10.5 NG/ML
GFR AFRICAN AMERICAN: >60 ML/MIN
GFR NON-AFRICAN AMERICAN: >60 ML/MIN
GFR SERPL CREATININE-BSD FRML MDRD: NORMAL ML/MIN/{1.73_M2}
GFR SERPL CREATININE-BSD FRML MDRD: NORMAL ML/MIN/{1.73_M2}
GLUCOSE BLD-MCNC: 81 MG/DL (ref 70–99)
HCT VFR BLD CALC: 41.1 % (ref 41–53)
HDLC SERPL-MCNC: 102 MG/DL
HEMOGLOBIN: 13.7 G/DL (ref 13.5–17.5)
HIV AG/AB: NONREACTIVE
LDL CHOLESTEROL: 54 MG/DL (ref 0–130)
MCH RBC QN AUTO: 30.4 PG (ref 26–34)
MCHC RBC AUTO-ENTMCNC: 33.4 G/DL (ref 31–37)
MCV RBC AUTO: 91 FL (ref 80–100)
NRBC AUTOMATED: NORMAL PER 100 WBC
PDW BLD-RTO: 13.7 % (ref 11.5–14.9)
PLATELET # BLD: 222 K/UL (ref 150–450)
PMV BLD AUTO: 7.5 FL (ref 6–12)
POTASSIUM SERPL-SCNC: 4.3 MMOL/L (ref 3.7–5.3)
RBC # BLD: 4.52 M/UL (ref 4.5–5.9)
SODIUM BLD-SCNC: 138 MMOL/L (ref 135–144)
TOTAL PROTEIN: 7.2 G/DL (ref 6.4–8.3)
TRIGL SERPL-MCNC: 31 MG/DL
TSH SERPL DL<=0.05 MIU/L-ACNC: 1.88 MIU/L (ref 0.3–5)
URIC ACID: 4.7 MG/DL (ref 3.4–7)
VITAMIN B-12: 296 PG/ML (ref 232–1245)
VLDLC SERPL CALC-MCNC: NORMAL MG/DL (ref 1–30)
WBC # BLD: 5.8 K/UL (ref 3.5–11)

## 2018-08-28 PROCEDURE — 82746 ASSAY OF FOLIC ACID SERUM: CPT

## 2018-08-28 PROCEDURE — 80053 COMPREHEN METABOLIC PANEL: CPT

## 2018-08-28 PROCEDURE — 85027 COMPLETE CBC AUTOMATED: CPT

## 2018-08-28 PROCEDURE — 87389 HIV-1 AG W/HIV-1&-2 AB AG IA: CPT

## 2018-08-28 PROCEDURE — 80061 LIPID PANEL: CPT

## 2018-08-28 PROCEDURE — 71046 X-RAY EXAM CHEST 2 VIEWS: CPT

## 2018-08-28 PROCEDURE — 36415 COLL VENOUS BLD VENIPUNCTURE: CPT

## 2018-08-28 PROCEDURE — 82607 VITAMIN B-12: CPT

## 2018-08-28 PROCEDURE — 73562 X-RAY EXAM OF KNEE 3: CPT

## 2018-08-28 PROCEDURE — 84443 ASSAY THYROID STIM HORMONE: CPT

## 2018-08-28 PROCEDURE — 84550 ASSAY OF BLOOD/URIC ACID: CPT

## 2018-08-29 DIAGNOSIS — E53.8 VITAMIN B 12 DEFICIENCY: Primary | ICD-10-CM

## 2018-08-30 ENCOUNTER — TELEPHONE (OUTPATIENT)
Dept: FAMILY MEDICINE CLINIC | Age: 53
End: 2018-08-30

## 2018-08-30 DIAGNOSIS — M25.562 CHRONIC PAIN OF BOTH KNEES: Primary | ICD-10-CM

## 2018-08-30 DIAGNOSIS — G89.29 CHRONIC PAIN OF BOTH KNEES: Primary | ICD-10-CM

## 2018-08-30 DIAGNOSIS — M25.469 PAIN AND SWELLING OF KNEE, UNSPECIFIED LATERALITY: ICD-10-CM

## 2018-08-30 DIAGNOSIS — M25.561 CHRONIC PAIN OF BOTH KNEES: Primary | ICD-10-CM

## 2018-08-30 DIAGNOSIS — M17.0 PRIMARY OSTEOARTHRITIS OF BOTH KNEES: ICD-10-CM

## 2018-08-30 DIAGNOSIS — M25.569 PAIN AND SWELLING OF KNEE, UNSPECIFIED LATERALITY: ICD-10-CM

## 2018-08-30 NOTE — TELEPHONE ENCOUNTER
Done, placed back in the box at  . Diagnosis Orders   1. Chronic pain of both knees  Elastic Bandages & Supports (KNEE BRACE/HINGED BARS LARGE) MISC   2. Pain and swelling of knee, unspecified laterality b/l  Elastic Bandages & Supports (KNEE BRACE/HINGED BARS LARGE) MISC   3.  Primary osteoarthritis of both knees pe Xrays  Elastic Bandages & Supports (KNEE BRACE/HINGED BARS LARGE) MISC

## 2018-08-30 NOTE — TELEPHONE ENCOUNTER
[facility] faxed stating that they need the knee brace re-written. Per Yoandy Del Angel it must state \"hinged knee brace\" for the rx to be valid and billable.      Pharm Counter Nick Acosta [phone], fax [phone]

## 2018-09-02 ENCOUNTER — TELEPHONE (OUTPATIENT)
Dept: FAMILY MEDICINE CLINIC | Age: 53
End: 2018-09-02

## 2018-09-02 DIAGNOSIS — B96.89 ACUTE BACTERIAL SINUSITIS: Primary | ICD-10-CM

## 2018-09-02 DIAGNOSIS — J01.90 ACUTE BACTERIAL SINUSITIS: Primary | ICD-10-CM

## 2018-09-02 RX ORDER — CLARITHROMYCIN 500 MG/1
500 TABLET, COATED ORAL 2 TIMES DAILY
Qty: 20 TABLET | Refills: 0 | Status: SHIPPED | OUTPATIENT
Start: 2018-09-02 | End: 2018-09-12

## 2018-09-02 NOTE — TELEPHONE ENCOUNTER
PCP: MD Apolinar Ness 1965    9/2/2018     FYI  Paged through answering service for sick call. Apolinar Lopez reports: Worsening sinus congestion, greenish rhinorrhea, sinus pressure. He wants antibiotic called into the pharmacy. He doesn't want a Z-Ricardo for 5 days he wants a stronger antibiotic. Orders Placed This Encounter   Medications    clarithromycin (BIAXIN) 500 MG tablet     Sig: Take 1 tablet by mouth 2 times daily for 10 days     Dispense:  20 tablet     Refill:  0       Discussed with the patient and all questioned fully answered. If worsening symptoms in 1-2 days or not getting better as expected needs to let us know and make appointment. The patient verbalizes understanding and agrees with the plan.     Milo Gray on 9/2/2018

## 2018-09-29 DIAGNOSIS — J44.9 COPD WITH ASTHMA (HCC): Primary | ICD-10-CM

## 2018-10-01 RX ORDER — ALBUTEROL SULFATE 90 UG/1
2 AEROSOL, METERED RESPIRATORY (INHALATION) EVERY 6 HOURS PRN
Qty: 8 G | Refills: 3 | Status: SHIPPED | OUTPATIENT
Start: 2018-10-01 | End: 2019-04-09 | Stop reason: SDUPTHER

## 2018-12-03 ENCOUNTER — OFFICE VISIT (OUTPATIENT)
Dept: PODIATRY | Age: 53
End: 2018-12-03
Payer: COMMERCIAL

## 2018-12-03 DIAGNOSIS — L74.513 HYPERHIDROSIS OF SOLES: ICD-10-CM

## 2018-12-03 DIAGNOSIS — M79.674 PAIN IN TOE OF RIGHT FOOT: ICD-10-CM

## 2018-12-03 DIAGNOSIS — M79.671 RIGHT FOOT PAIN: ICD-10-CM

## 2018-12-03 DIAGNOSIS — L84 CORNS AND CALLOSITIES: Primary | ICD-10-CM

## 2018-12-03 PROCEDURE — 11056 PARNG/CUTG B9 HYPRKR LES 2-4: CPT | Performed by: PODIATRIST

## 2018-12-03 PROCEDURE — 99999 PR OFFICE/OUTPT VISIT,PROCEDURE ONLY: CPT | Performed by: PODIATRIST

## 2018-12-05 ENCOUNTER — CLINICAL DOCUMENTATION (OUTPATIENT)
Dept: PODIATRY | Age: 53
End: 2018-12-05

## 2018-12-05 DIAGNOSIS — I10 ESSENTIAL HYPERTENSION: ICD-10-CM

## 2018-12-05 DIAGNOSIS — K21.9 GASTROESOPHAGEAL REFLUX DISEASE WITHOUT ESOPHAGITIS: ICD-10-CM

## 2018-12-05 ASSESSMENT — ENCOUNTER SYMPTOMS
DIARRHEA: 0
BACK PAIN: 0
NAUSEA: 0
SHORTNESS OF BREATH: 0
COLOR CHANGE: 0

## 2018-12-06 NOTE — TELEPHONE ENCOUNTER
Pt needs to be called and have apt prior to sending this to [facility] for approval

## 2018-12-10 RX ORDER — OMEPRAZOLE 20 MG/1
CAPSULE, DELAYED RELEASE ORAL
Qty: 90 CAPSULE | Refills: 3 | Status: SHIPPED | OUTPATIENT
Start: 2018-12-10 | End: 2019-08-29 | Stop reason: SDUPTHER

## 2018-12-10 RX ORDER — LISINOPRIL 5 MG/1
TABLET ORAL
Qty: 90 TABLET | Refills: 3 | Status: SHIPPED | OUTPATIENT
Start: 2018-12-10 | End: 2019-08-29 | Stop reason: HOSPADM

## 2018-12-30 DIAGNOSIS — J44.9 CHRONIC OBSTRUCTIVE PULMONARY DISEASE, UNSPECIFIED COPD TYPE (HCC): ICD-10-CM

## 2018-12-31 RX ORDER — MOMETASONE FUROATE AND FORMOTEROL FUMARATE DIHYDRATE 200; 5 UG/1; UG/1
AEROSOL RESPIRATORY (INHALATION)
Qty: 13 G | Refills: 5 | Status: SHIPPED | OUTPATIENT
Start: 2018-12-31 | End: 2019-08-29 | Stop reason: ALTCHOICE

## 2019-01-09 DIAGNOSIS — E53.8 VITAMIN B 12 DEFICIENCY: ICD-10-CM

## 2019-01-09 RX ORDER — LANOLIN ALCOHOL/MO/W.PET/CERES
CREAM (GRAM) TOPICAL
Qty: 90 TABLET | Refills: 2 | Status: SHIPPED | OUTPATIENT
Start: 2019-01-09 | End: 2020-07-29

## 2019-03-04 ENCOUNTER — OFFICE VISIT (OUTPATIENT)
Dept: PODIATRY | Age: 54
End: 2019-03-04
Payer: COMMERCIAL

## 2019-03-04 VITALS — BODY MASS INDEX: 26.77 KG/M2 | WEIGHT: 187 LBS | HEIGHT: 70 IN

## 2019-03-04 DIAGNOSIS — M79.674 PAIN IN TOE OF RIGHT FOOT: ICD-10-CM

## 2019-03-04 DIAGNOSIS — L84 CORNS AND CALLOSITIES: Primary | ICD-10-CM

## 2019-03-04 DIAGNOSIS — M79.671 RIGHT FOOT PAIN: ICD-10-CM

## 2019-03-04 DIAGNOSIS — M79.672 LEFT FOOT PAIN: ICD-10-CM

## 2019-03-04 PROCEDURE — 99999 PR OFFICE/OUTPT VISIT,PROCEDURE ONLY: CPT | Performed by: PODIATRIST

## 2019-03-04 PROCEDURE — 11057 PARNG/CUTG B9 HYPRKR LES >4: CPT | Performed by: PODIATRIST

## 2019-03-05 ASSESSMENT — ENCOUNTER SYMPTOMS
SHORTNESS OF BREATH: 0
DIARRHEA: 0
NAUSEA: 0
COLOR CHANGE: 0
BACK PAIN: 0

## 2019-04-09 DIAGNOSIS — J44.9 COPD WITH ASTHMA (HCC): ICD-10-CM

## 2019-04-09 NOTE — TELEPHONE ENCOUNTER
Please Approve or Refuse.   Send to Pharmacy per Pt's Request:    Next Visit Date:  Visit date not found   Last Visit Date: 8/8/2018    No results found for: LABA1C          ( goal A1C is < 7)   BP Readings from Last 3 Encounters:   08/08/18 138/88   04/30/18 137/81   01/26/18 122/83          (goal 120/80)  BUN   Date Value Ref Range Status   08/28/2018 14 6 - 20 mg/dL Final     CREATININE   Date Value Ref Range Status   08/28/2018 0.95 0.70 - 1.20 mg/dL Final     Potassium   Date Value Ref Range Status   08/28/2018 4.3 3.7 - 5.3 mmol/L Final

## 2019-04-30 ENCOUNTER — OFFICE VISIT (OUTPATIENT)
Dept: FAMILY MEDICINE CLINIC | Age: 54
End: 2019-04-30
Payer: COMMERCIAL

## 2019-04-30 VITALS
SYSTOLIC BLOOD PRESSURE: 121 MMHG | OXYGEN SATURATION: 99 % | BODY MASS INDEX: 27.58 KG/M2 | HEIGHT: 71 IN | RESPIRATION RATE: 16 BRPM | DIASTOLIC BLOOD PRESSURE: 78 MMHG | HEART RATE: 105 BPM | WEIGHT: 197 LBS | TEMPERATURE: 100.8 F

## 2019-04-30 DIAGNOSIS — R05.9 COUGH: ICD-10-CM

## 2019-04-30 DIAGNOSIS — J44.1 COPD WITH ACUTE EXACERBATION (HCC): Primary | ICD-10-CM

## 2019-04-30 DIAGNOSIS — J44.9 COPD WITH ASTHMA (HCC): ICD-10-CM

## 2019-04-30 DIAGNOSIS — J34.89 SINUS PRESSURE: ICD-10-CM

## 2019-04-30 PROCEDURE — 99202 OFFICE O/P NEW SF 15 MIN: CPT | Performed by: NURSE PRACTITIONER

## 2019-04-30 PROCEDURE — 4004F PT TOBACCO SCREEN RCVD TLK: CPT | Performed by: NURSE PRACTITIONER

## 2019-04-30 PROCEDURE — 3023F SPIROM DOC REV: CPT | Performed by: NURSE PRACTITIONER

## 2019-04-30 PROCEDURE — G8926 SPIRO NO PERF OR DOC: HCPCS | Performed by: NURSE PRACTITIONER

## 2019-04-30 PROCEDURE — G8419 CALC BMI OUT NRM PARAM NOF/U: HCPCS | Performed by: NURSE PRACTITIONER

## 2019-04-30 PROCEDURE — 3017F COLORECTAL CA SCREEN DOC REV: CPT | Performed by: NURSE PRACTITIONER

## 2019-04-30 PROCEDURE — G8427 DOCREV CUR MEDS BY ELIG CLIN: HCPCS | Performed by: NURSE PRACTITIONER

## 2019-04-30 RX ORDER — ALBUTEROL SULFATE 90 UG/1
AEROSOL, METERED RESPIRATORY (INHALATION)
Qty: 18 G | Refills: 2 | Status: SHIPPED | OUTPATIENT
Start: 2019-04-30 | End: 2020-01-15 | Stop reason: SDUPTHER

## 2019-04-30 RX ORDER — PREDNISONE 20 MG/1
20 TABLET ORAL 2 TIMES DAILY
Qty: 10 TABLET | Refills: 0 | Status: SHIPPED | OUTPATIENT
Start: 2019-04-30 | End: 2019-05-05

## 2019-04-30 RX ORDER — AZITHROMYCIN 500 MG/1
500 TABLET, FILM COATED ORAL DAILY
Qty: 7 TABLET | Refills: 0 | Status: SHIPPED | OUTPATIENT
Start: 2019-04-30 | End: 2019-05-07

## 2019-04-30 RX ORDER — GUAIFENESIN AND CODEINE PHOSPHATE 100; 10 MG/5ML; MG/5ML
5 SOLUTION ORAL EVERY 4 HOURS PRN
Qty: 80 ML | Refills: 0 | Status: SHIPPED | OUTPATIENT
Start: 2019-04-30 | End: 2019-05-05

## 2019-04-30 ASSESSMENT — PATIENT HEALTH QUESTIONNAIRE - PHQ9
SUM OF ALL RESPONSES TO PHQ9 QUESTIONS 1 & 2: 0
2. FEELING DOWN, DEPRESSED OR HOPELESS: 0
SUM OF ALL RESPONSES TO PHQ QUESTIONS 1-9: 0
SUM OF ALL RESPONSES TO PHQ QUESTIONS 1-9: 0
1. LITTLE INTEREST OR PLEASURE IN DOING THINGS: 0

## 2019-04-30 ASSESSMENT — ENCOUNTER SYMPTOMS
WHEEZING: 1
SHORTNESS OF BREATH: 0
SINUS PAIN: 1
RHINORRHEA: 1
COUGH: 1
SINUS PRESSURE: 1
SORE THROAT: 0

## 2019-04-30 NOTE — PROGRESS NOTES
[address] [facility]   [address] [facility]  [address] [facility] [address]  Dept: [phone]  Dept Fax: [phone]    Ramila Bray is a 47 y.o. male who presents to the urgent care today for his medicalconditions/complaints as noted below. Ramila Bray is c/o of Sinus Problem (facial pain and pressure, nasal drainage, cough, fever, x two days )      HPI:       53 milks is with complaint of sinus pain to the frontal region, congestion, cough. Describes has had mild congestion for the past 7 days however symptoms worsen significantly slight. Describes pressure to the frontal region as throbbing. Describes nasal congestion, rhinorrhea that is green. Describes cough productive of thick green mucus for the past 3 days. History of COPD, is a current smoker. Patient reports he has been using his inhaler more than usual.  Gastritis right-sided lateral rib pain worsens with cough, sneeze, turn, twist, bending. Pain is reproducible to palpation. Relieving factors include none. Worsening factors include none. Treatment tried include his inhalers.       Past Medical History:   Diagnosis Date    Alcohol abuse     Allergic rhinitis     Asthma     COPD (chronic obstructive pulmonary disease) (HCC)     GERD (gastroesophageal reflux disease)     Hypertension     Pansinusitis     Primary osteoarthritis of both knees pe Xrays 8/30/2018        Current Outpatient Medications   Medication Sig Dispense Refill    albuterol sulfate HFA (VENTOLIN HFA) 108 (90 Base) MCG/ACT inhaler INHALE TWO PUFFS BY MOUTH EVERY 6 HOURS AS NEEDED FOR WHEEZING OR FOR SHORTNESS OF BREATH (COUGH) 18 g 2    predniSONE (DELTASONE) 20 MG tablet Take 1 tablet by mouth 2 times daily for 5 days 10 tablet 0    azithromycin (ZITHROMAX) 500 MG tablet Take 1 tablet by mouth daily for 7 days 7 tablet 0    guaiFENesin-codeine (CHERATUSSIN AC) 100-10 MG/5ML syrup Take 5 mLs by mouth every 4 hours as well-nourished. HENT:   Head: Atraumatic. Right Ear: Tympanic membrane normal.   Left Ear: Tympanic membrane normal.   Nose: Mucosal edema and rhinorrhea present. Right sinus exhibits frontal sinus tenderness. Right sinus exhibits no maxillary sinus tenderness. Left sinus exhibits frontal sinus tenderness. Left sinus exhibits no maxillary sinus tenderness. Mouth/Throat: Uvula is midline and oropharynx is clear and moist.   Cardiovascular: Normal rate. Pulmonary/Chest: Effort normal. He has wheezes. Neurological: He is alert. Skin: Skin is warm and dry. Psychiatric: He has a normal mood and affect. His behavior is normal.   Nursing note and vitals reviewed. /78 (Site: Left Upper Arm, Position: Sitting, Cuff Size: Medium Adult)   Pulse 105   Temp 100.8 °F (38.2 °C) (Temporal)   Resp 16   Ht 5' 11\" (1.803 m)   Wt 197 lb (89.4 kg)   SpO2 99%   BMI 27.48 kg/m²   Lab Review not applicable    Assessment:       Diagnosis Orders   1. COPD with acute exacerbation (HCC)  albuterol sulfate HFA (VENTOLIN HFA) 108 (90 Base) MCG/ACT inhaler    predniSONE (DELTASONE) 20 MG tablet    azithromycin (ZITHROMAX) 500 MG tablet    guaiFENesin-codeine (CHERATUSSIN AC) 100-10 MG/5ML syrup   2. Sinus pressure  predniSONE (DELTASONE) 20 MG tablet   3. Cough  albuterol sulfate HFA (VENTOLIN HFA) 108 (90 Base) MCG/ACT inhaler    predniSONE (DELTASONE) 20 MG tablet    azithromycin (ZITHROMAX) 500 MG tablet    guaiFENesin-codeine (CHERATUSSIN AC) 100-10 MG/5ML syrup   4. [facility] ([facility]  albuterol sulfate HFA (VENTOLIN HFA) 108 (90 Base) MCG/ACT inhaler    predniSONE (DELTASONE) 20 MG tablet    azithromycin (ZITHROMAX) 500 MG tablet    guaiFENesin-codeine (CHERATUSSIN AC) 100-10 MG/5ML syrup       Plan:      Return if symptoms worsen or fail to improve.     Orders Placed This Encounter   Medications    albuterol sulfate HFA (VENTOLIN HFA) 108 (90 Base) MCG/ACT inhaler     Sig: INHALE TWO PUFFS BY MOUTH EVERY 6 HOURS AS NEEDED FOR WHEEZING OR FOR SHORTNESS OF BREATH (COUGH)     Dispense:  18 g     Refill:  2    predniSONE (DELTASONE) 20 MG tablet     Sig: Take 1 tablet by mouth 2 times daily for 5 days     Dispense:  10 tablet     Refill:  0    azithromycin (ZITHROMAX) 500 MG tablet     Sig: Take 1 tablet by mouth daily for 7 days     Dispense:  7 tablet     Refill:  0    guaiFENesin-codeine (CHERATUSSIN AC) 100-10 MG/5ML syrup     Sig: Take 5 mLs by mouth every 4 hours as needed for Cough or Congestion for up to 5 days. Dispense:  80 mL     Refill:  0     Reduce doses taken as pain becomes manageable     Discussed antibiotic dose/duration. Discussed oral steroid dose/duration. Discussed cough medication dose/duration. Refill of inhaler provided. Discussed to follow up here or with PCP if sx worsen or persist.  Pt agreeable to plan. Patient given educational materials - see patient instructions. Discussed use, benefit, and side effects of prescribed medications. All patientquestions answered. Pt voiced understanding. This note was transcribed using dictation with Dragon services. Efforts were made to correct any errors but some words may be misinterpreted.      Electronically signed by JO Germain CNP on 4/30/2019at 7:56 PM

## 2019-04-30 NOTE — PATIENT INSTRUCTIONS
Patient Education        COPD Exacerbation Plan: Care Instructions  Your Care Instructions    If you have chronic obstructive pulmonary disease (COPD), your usual shortness of breath could suddenly get worse. You may start coughing more and have more mucus. This flare-up is called a COPD exacerbation (say \"mr-GDZ-[id number]-BAY-shlomo\"). A lung infection or air pollution could set off an exacerbation. Sometimes it can happen after a quick change in temperature or being around chemicals. Work with your doctor to make a plan for dealing with an exacerbation. You can better manage it if you plan ahead. Follow-up care is a key part of your treatment and safety. Be sure to make and go to all appointments, and call your doctor if you are having problems. It's also a good idea to know your test results and keep a list of the medicines you take. How can you care for yourself at home? During an exacerbation  · Do not panic if you start to have one. Quick treatment at home may help you prevent serious breathing problems. If you have a COPD exacerbation plan that you developed with your doctor, follow it. · Take your medicines exactly as your doctor tells you.  ? Use your inhaler as directed by your doctor. If your symptoms do not get better after you use your medicine, have someone take you to the emergency room. Call an ambulance if necessary. ? With inhaled medicines, a spacer or a nebulizer may help you get more medicine to your lungs. Ask your doctor or pharmacist how to use them properly. Practice using the spacer in front of a mirror before you have an exacerbation. This may help you get the medicine into your lungs quickly. ? If your doctor has given you steroid pills, take them as directed. ? Your doctor may have given you a prescription for antibiotics, which you can fill if you need it. ? Talk to your doctor if you have any problems with your medicine.  And call your doctor if you have to use your antibiotic or

## 2019-05-07 ENCOUNTER — TELEPHONE (OUTPATIENT)
Dept: FAMILY MEDICINE CLINIC | Age: 54
End: 2019-05-07

## 2019-05-07 DIAGNOSIS — J44.1 COPD WITH ACUTE EXACERBATION (HCC): Primary | ICD-10-CM

## 2019-05-07 RX ORDER — AZITHROMYCIN 500 MG/1
500 TABLET, FILM COATED ORAL DAILY
Qty: 3 TABLET | Refills: 0 | Status: SHIPPED | OUTPATIENT
Start: 2019-05-07 | End: 2019-05-10

## 2019-05-07 NOTE — TELEPHONE ENCOUNTER
Patient was seen by you on 4/30/19 and patient is stating he is not feeling any better. Symptoms are cough with phlegm and sinus pressure. He stated he was told to call if he wasn't feeling any better. Please advise. Thank you.

## 2019-05-07 NOTE — TELEPHONE ENCOUNTER
Pt called back and was advised that med was sent to pharmacy and he would need to be re-evaluated if symptoms have not improved.

## 2019-07-29 DIAGNOSIS — M25.562 ACUTE PAIN OF BOTH KNEES: Primary | ICD-10-CM

## 2019-07-29 DIAGNOSIS — M25.561 ACUTE PAIN OF BOTH KNEES: Primary | ICD-10-CM

## 2019-08-29 ENCOUNTER — TELEPHONE (OUTPATIENT)
Dept: FAMILY MEDICINE CLINIC | Age: 54
End: 2019-08-29

## 2019-08-29 ENCOUNTER — OFFICE VISIT (OUTPATIENT)
Dept: FAMILY MEDICINE CLINIC | Age: 54
End: 2019-08-29
Payer: COMMERCIAL

## 2019-08-29 VITALS
SYSTOLIC BLOOD PRESSURE: 132 MMHG | HEART RATE: 87 BPM | BODY MASS INDEX: 29.01 KG/M2 | DIASTOLIC BLOOD PRESSURE: 84 MMHG | OXYGEN SATURATION: 95 % | WEIGHT: 207.2 LBS | HEIGHT: 71 IN

## 2019-08-29 DIAGNOSIS — I73.00 RAYNAUD'S DISEASE WITHOUT GANGRENE: ICD-10-CM

## 2019-08-29 DIAGNOSIS — I10 ESSENTIAL HYPERTENSION: ICD-10-CM

## 2019-08-29 DIAGNOSIS — R20.0 RIGHT LEG NUMBNESS: ICD-10-CM

## 2019-08-29 DIAGNOSIS — M25.561 CHRONIC PAIN OF BOTH KNEES: ICD-10-CM

## 2019-08-29 DIAGNOSIS — I83.893 VARICOSE VEINS OF BILATERAL LOWER EXTREMITIES WITH OTHER COMPLICATIONS: ICD-10-CM

## 2019-08-29 DIAGNOSIS — G89.29 CHRONIC PAIN OF BOTH KNEES: ICD-10-CM

## 2019-08-29 DIAGNOSIS — K21.9 GASTROESOPHAGEAL REFLUX DISEASE WITHOUT ESOPHAGITIS: ICD-10-CM

## 2019-08-29 DIAGNOSIS — M17.0 PRIMARY OSTEOARTHRITIS OF BOTH KNEES: ICD-10-CM

## 2019-08-29 DIAGNOSIS — E53.8 VITAMIN B 12 DEFICIENCY: ICD-10-CM

## 2019-08-29 DIAGNOSIS — Z12.11 COLON CANCER SCREENING: ICD-10-CM

## 2019-08-29 DIAGNOSIS — M25.562 CHRONIC PAIN OF BOTH KNEES: ICD-10-CM

## 2019-08-29 DIAGNOSIS — Z13.6 SCREENING FOR CARDIOVASCULAR CONDITION: ICD-10-CM

## 2019-08-29 DIAGNOSIS — J44.9 COPD WITH ASTHMA (HCC): Primary | ICD-10-CM

## 2019-08-29 DIAGNOSIS — Z23 NEED FOR PROPHYLACTIC VACCINATION AND INOCULATION AGAINST VARICELLA: ICD-10-CM

## 2019-08-29 PROBLEM — E78.5 HYPERLIPIDEMIA WITH TARGET LDL LESS THAN 100: Status: ACTIVE | Noted: 2019-08-29

## 2019-08-29 PROCEDURE — 4004F PT TOBACCO SCREEN RCVD TLK: CPT | Performed by: FAMILY MEDICINE

## 2019-08-29 PROCEDURE — G8419 CALC BMI OUT NRM PARAM NOF/U: HCPCS | Performed by: FAMILY MEDICINE

## 2019-08-29 PROCEDURE — G8926 SPIRO NO PERF OR DOC: HCPCS | Performed by: FAMILY MEDICINE

## 2019-08-29 PROCEDURE — 3017F COLORECTAL CA SCREEN DOC REV: CPT | Performed by: FAMILY MEDICINE

## 2019-08-29 PROCEDURE — 99214 OFFICE O/P EST MOD 30 MIN: CPT | Performed by: FAMILY MEDICINE

## 2019-08-29 PROCEDURE — G8427 DOCREV CUR MEDS BY ELIG CLIN: HCPCS | Performed by: FAMILY MEDICINE

## 2019-08-29 PROCEDURE — 3023F SPIROM DOC REV: CPT | Performed by: FAMILY MEDICINE

## 2019-08-29 RX ORDER — AMLODIPINE BESYLATE 10 MG/1
10 TABLET ORAL DAILY
Qty: 90 TABLET | Refills: 1 | Status: SHIPPED | OUTPATIENT
Start: 2019-08-29 | End: 2020-02-26

## 2019-08-29 RX ORDER — OMEPRAZOLE 20 MG/1
20 CAPSULE, DELAYED RELEASE ORAL EVERY MORNING
Qty: 90 CAPSULE | Refills: 3 | Status: SHIPPED | OUTPATIENT
Start: 2019-08-29 | End: 2020-01-15 | Stop reason: DRUGHIGH

## 2019-08-29 RX ORDER — LISINOPRIL 5 MG/1
TABLET ORAL
Qty: 90 TABLET | Refills: 3 | Status: CANCELLED | OUTPATIENT
Start: 2019-08-29

## 2019-08-29 ASSESSMENT — ENCOUNTER SYMPTOMS
ABDOMINAL PAIN: 0
NAUSEA: 0
WHEEZING: 0
ABDOMINAL DISTENTION: 0
CHEST TIGHTNESS: 0
COUGH: 0
COLOR CHANGE: 1
DIARRHEA: 0
VOMITING: 0
SHORTNESS OF BREATH: 1
CONSTIPATION: 0

## 2019-08-29 ASSESSMENT — PATIENT HEALTH QUESTIONNAIRE - PHQ9
SUM OF ALL RESPONSES TO PHQ QUESTIONS 1-9: 0
1. LITTLE INTEREST OR PLEASURE IN DOING THINGS: 0
SUM OF ALL RESPONSES TO PHQ QUESTIONS 1-9: 0
SUM OF ALL RESPONSES TO PHQ9 QUESTIONS 1 & 2: 0
2. FEELING DOWN, DEPRESSED OR HOPELESS: 0

## 2019-08-29 ASSESSMENT — ASTHMA QUESTIONNAIRES
QUESTION_5 LAST FOUR WEEKS HOW WOULD YOU RATE YOUR ASTHMA CONTROL: 4
QUESTION_3 LAST FOUR WEEKS HOW OFTEN DID YOUR ASTHMA SYMPTOMS (WHEEZING, COUGHING, SHORTNESS OF BREATH, CHEST TIGHTNESS OR PAIN) WAKE YOU UP AT NIGHT OR EARLIER THAN USUAL IN THE MORNING: 5
QUESTION_1 LAST FOUR WEEKS HOW MUCH OF THE TIME DID YOUR ASTHMA KEEP YOU FROM GETTING AS MUCH DONE AT WORK, SCHOOL OR AT HOME: 5
QUESTION_2 LAST FOUR WEEKS HOW OFTEN HAVE YOU HAD SHORTNESS OF BREATH: 4
QUESTION_4 LAST FOUR WEEKS HOW OFTEN HAVE YOU USED YOUR RESCUE INHALER OR NEBULIZER MEDICATION (SUCH AS ALBUTEROL): 1
ACT_TOTALSCORE: 19

## 2019-08-29 NOTE — PROGRESS NOTES
heard.  bilateral legs with tortuous varicose veins on the medial aspects   Pulmonary/Chest: Effort normal. No respiratory distress. He has decreased breath sounds in the right lower field and the left lower field. He has no wheezes. He has no rales. He exhibits no tenderness. Abdominal: Soft. Bowel sounds are normal. He exhibits no distension. There is no tenderness. Musculoskeletal: He exhibits edema (trace pitting edema b/l) and tenderness. Right knee: He exhibits decreased range of motion. Tenderness found. Medial joint line, lateral joint line and patellar tendon tenderness noted. Left knee: He exhibits decreased range of motion. Tenderness found. Medial joint line, lateral joint line and patellar tendon tenderness noted. Antalgic gait noted. Coarse crepitus bilateral knees   Neurological: He is alert and oriented to person, place, and time. He displays normal reflexes. A sensory deficit (decreased on the RLE) is present. No cranial nerve deficit. He exhibits normal muscle tone. Coordination normal.   Skin: Skin is warm and dry. Capillary refill takes less than 2 seconds. No rash noted. He is not diaphoretic. Psychiatric: His behavior is normal. Judgment and thought content normal. His mood appears anxious. Nursing note and vitals reviewed. Discussed testing with the patient and all questions fully answered. I personally reviewed testing with patient.     Vitamin B12 deficiency    Otherwise labs within normal limits      Lab Results   Component Value Date    URICACID 4.7 08/28/2018         Lab Results   Component Value Date    WBC 5.8 08/28/2018    HGB 13.7 08/28/2018    HCT 41.1 08/28/2018    MCV 91.0 08/28/2018     08/28/2018       Lab Results   Component Value Date     08/28/2018    K 4.3 08/28/2018     08/28/2018    CO2 24 08/28/2018    BUN 14 08/28/2018    CREATININE 0.95 08/28/2018    GLUCOSE 81 08/28/2018    CALCIUM 9.3 08/28/2018        Lab Results Component Value Date    ALT 22 08/28/2018    AST 23 08/28/2018    ALKPHOS 86 08/28/2018    BILITOT 0.47 08/28/2018       Lab Results   Component Value Date    TSH 1.88 08/28/2018       Lab Results   Component Value Date    CHOL 162 08/28/2018     Lab Results   Component Value Date    TRIG 31 08/28/2018     Lab Results   Component Value Date     08/28/2018     Lab Results   Component Value Date    LDLCHOLESTEROL 54 08/28/2018     Lab Results   Component Value Date    VLDL NOT REPORTED 08/28/2018     Lab Results   Component Value Date    CHOLHDLRATIO 1.6 08/28/2018         Lab Results   Component Value Date    KYBOXBGN23 296 08/28/2018     Lab Results   Component Value Date    FOLATE 10.5 08/28/2018     No results found for: VITD25    CXR-mild degenerative changes in the back. Lungs are hyperinflated consistent with COPD     FINDINGS:   Lungs are hyperinflated consistent with history of COPD.  The lungs are   without acute focal normal without acute process.  Mild degenerative change   in the lower thoracic spine.           Impression   No acute process. X-ray of the left knee showed osteoarthritis, moderate severity with moderate joint effusion    X-ray of the right knee show moderate osteoarthritis with small amount of fluid    ASSESSMENT AND PLAN      1. COPD with asthma ([address]  Stable  Stop Dulera  -start fluticasone-salmeterol (ADVAIR DISKUS) 500-50 MCG/DOSE diskus inhaler; Inhale 1 puff into the lungs every 12 hours  Dispense: 60 each; Refill: 3  - XR CHEST STANDARD (2 VW); Future  - Full PFT Study With Bronchodilator; Future  Albuterol as needed  Smoking cessation counseling given, his quit date will be January 1st 2. Essential hypertension  Well controlled. Discussed low salt diet and BP and pulse monitoring daily, BP log given  - CBC; Future  - Comprehensive Metabolic Panel; Future  - TSH without Reflex; Future  -start amLODIPine (NORVASC) 10 MG tablet;  Take 1 tablet by mouth daily ** stop the patient to keep his knee braces loser    12. Colon cancer screening  - POCT Fecal Immunochemical Test (FIT);  Future          Data Unavailable      Orders Placed This Encounter   Procedures    XR CHEST STANDARD (2 VW)     Standing Status:   Future     Standing Expiration Date:   8/28/2020     Order Specific Question:   Reason for exam:     Answer:   COPD    CBC     Standing Status:   Future     Standing Expiration Date:   8/29/2020    Comprehensive Metabolic Panel     Standing Status:   Future     Standing Expiration Date:   8/29/2020    Lipid Panel     Standing Status:   Future     Standing Expiration Date:   8/29/2020     Order Specific Question:   Is Patient Fasting?/# of Hours     Answer:   8-10 Hours, water ok to drink    Vitamin B12 & Folate     Standing Status:   Future     Standing Expiration Date:   8/29/2020    TSH without Reflex     Standing Status:   Future     Standing Expiration Date:   8/29/2020   Dank Mahoney MD, Vascular Surgery, [address]     Referral Priority:   Routine     Referral Type:   Eval and Treat     Referral Reason:   Specialty Services Required     Referred to Provider:   Pedro Dunbar MD     Requested Specialty:   Vascular Surgery     Number of Visits Requested:   1    POCT Fecal Immunochemical Test (FIT)     Standing Status:   Future     Standing Expiration Date:   8/28/2020    Full PFT Study With Bronchodilator     Standing Status:   Future     Standing Expiration Date:   8/28/2020     Scheduling Instructions:      Pre- and post bronchodilator         Medications Discontinued During This Encounter   Medication Reason    DULERA 200-5 MCG/ACT inhaler Alternate therapy    lisinopril (PRINIVIL;ZESTRIL) 5 MG tablet Stop Taking at Discharge    Elastic Bandages & Supports (KNEE BRACE/FLEX STAYS LARGE) MISC REORDER    omeprazole (PRILOSEC) 20 MG delayed release capsule REORDER    Elastic Bandages & Supports (KNEE BRACE/FLEX STAYS LARGE) MISC Cost of

## 2019-08-29 NOTE — PATIENT INSTRUCTIONS
triggers:  ____________________________________  ____________________________________  ____________________________________      ____________________________________  4. ________________________________________________________________________ Meet these triggers head-on  Knowing your triggers is very important. It can help you stay away from things that tempt you to smoke. It can prepare you to fight the urge when you are tempted. ? Stay away from places where smoking is allowed. ? Sit in the non-smoking section at restaurants. ? Keep your hands busy. Hold a pencil or paper clip. Doodle or write a letter. Carry a water bottle. ? Stay away from people who smoke. Spend time with non-smoking friends. ? Put something else in your mouth. Chew sugar free gum. Snack on a carrot, celery stick, or frozen grapes. Keep your mouth and hands busy with a toothpick, sugar free lollipop, or straw. ? Drink less or stay away from alcohol. Drinking alcohol often makes people want to smoke. Drink juice, diet soda, or ice water instead. ? Remember: The urge to smoke will come and go. Cravings usually last for a brief period of time. Try to wait it out. .. Other ways I can cope with my triggers:  ______________________________________________________________  ______________________________________________________________  ______________________________________________________________  ______________________________________________________________    5. When you really crave a cigarette    Remember: The urge to smoke usually lasts only three to five minutes. Try to wait it out. Or look at the plan you made above. You wrote down steps to take at a time like this. Try them! You can also try these tips:    ? Keep other things around instead of cigarettes. Try carrots, pickles, sunflower seeds, apples, celery, raisins, frozen grapes or sugar free gum. ? Wash your hands or the dishes when you want a cigarette very badly.  Or take a start to leave your body. Your pulse rate goes back to normal. The oxygen in your blood rises to a normal level. Within a few days you may notice other things:  - Your senses of taste and smell are better. - You can breathe easier.  - Your smokers hack starts to go away. (You may keep coughing for a while, though.)    The nicotine leaves your body within three days. Your body starts to repair itself. At first, you may feel worse instead of better. Withdrawal feelings can be hard. But they are a sign that your body is healing. Stay upbeat  As you go through the first days and weeks without smoking, keep a positive outlook. Dont blame or punish yourself if you do have a cigarette. Dont think of smoking as all or none.  Instead, take it one day at a time. Remember that quitting is a learning process. Keep rewarding yourself for not smoking! Adapted from: Clearing the Air (2003) U.[place].  Department of Health and [employer], RACHEL Kenny. [employer]; NIH Publication No.

## 2019-09-18 ENCOUNTER — TELEPHONE (OUTPATIENT)
Dept: FAMILY MEDICINE CLINIC | Age: 54
End: 2019-09-18

## 2019-09-28 PROBLEM — Z13.6 SCREENING FOR CARDIOVASCULAR CONDITION: Status: RESOLVED | Noted: 2019-08-29 | Resolved: 2019-09-28

## 2019-10-10 ENCOUNTER — OFFICE VISIT (OUTPATIENT)
Dept: PODIATRY | Age: 54
End: 2019-10-10
Payer: COMMERCIAL

## 2019-10-10 VITALS — WEIGHT: 187 LBS | HEIGHT: 70 IN | BODY MASS INDEX: 26.77 KG/M2

## 2019-10-10 DIAGNOSIS — R60.0 EDEMA OF LOWER EXTREMITY: ICD-10-CM

## 2019-10-10 DIAGNOSIS — M79.671 PAIN IN RIGHT FOOT: ICD-10-CM

## 2019-10-10 DIAGNOSIS — M76.71 PERONEAL TENDINITIS OF RIGHT LOWER EXTREMITY: Primary | ICD-10-CM

## 2019-10-10 PROCEDURE — G8419 CALC BMI OUT NRM PARAM NOF/U: HCPCS | Performed by: PODIATRIST

## 2019-10-10 PROCEDURE — G8427 DOCREV CUR MEDS BY ELIG CLIN: HCPCS | Performed by: PODIATRIST

## 2019-10-10 PROCEDURE — 3017F COLORECTAL CA SCREEN DOC REV: CPT | Performed by: PODIATRIST

## 2019-10-10 PROCEDURE — G8484 FLU IMMUNIZE NO ADMIN: HCPCS | Performed by: PODIATRIST

## 2019-10-10 PROCEDURE — 4004F PT TOBACCO SCREEN RCVD TLK: CPT | Performed by: PODIATRIST

## 2019-10-10 PROCEDURE — 99213 OFFICE O/P EST LOW 20 MIN: CPT | Performed by: PODIATRIST

## 2019-10-13 ASSESSMENT — ENCOUNTER SYMPTOMS
COLOR CHANGE: 0
DIARRHEA: 0
NAUSEA: 0
SHORTNESS OF BREATH: 0
BACK PAIN: 0

## 2019-10-28 ENCOUNTER — OFFICE VISIT (OUTPATIENT)
Dept: PODIATRY | Age: 54
End: 2019-10-28
Payer: COMMERCIAL

## 2019-10-28 VITALS — BODY MASS INDEX: 28.98 KG/M2 | WEIGHT: 207 LBS | HEIGHT: 71 IN

## 2019-10-28 DIAGNOSIS — Z74.09 PROLONGED IMMOBILIZATION: ICD-10-CM

## 2019-10-28 DIAGNOSIS — M79.672 LEFT FOOT PAIN: ICD-10-CM

## 2019-10-28 DIAGNOSIS — M79.671 PAIN IN RIGHT FOOT: ICD-10-CM

## 2019-10-28 DIAGNOSIS — M76.71 PERONEAL TENDONITIS OF RIGHT LOWER EXTREMITY: Primary | ICD-10-CM

## 2019-10-28 DIAGNOSIS — L08.89 PITTED KERATOLYSIS: ICD-10-CM

## 2019-10-28 DIAGNOSIS — Z91.89 AT HIGH RISK FOR DEEP VENOUS THROMBOSIS: ICD-10-CM

## 2019-10-28 DIAGNOSIS — R60.0 EDEMA OF LOWER EXTREMITY: ICD-10-CM

## 2019-10-28 DIAGNOSIS — L84 CORNS AND CALLOSITIES: ICD-10-CM

## 2019-10-28 PROCEDURE — 4004F PT TOBACCO SCREEN RCVD TLK: CPT | Performed by: PODIATRIST

## 2019-10-28 PROCEDURE — 11055 PARING/CUTG B9 HYPRKER LES 1: CPT | Performed by: PODIATRIST

## 2019-10-28 PROCEDURE — 3017F COLORECTAL CA SCREEN DOC REV: CPT | Performed by: PODIATRIST

## 2019-10-28 PROCEDURE — 99213 OFFICE O/P EST LOW 20 MIN: CPT | Performed by: PODIATRIST

## 2019-10-28 PROCEDURE — G8419 CALC BMI OUT NRM PARAM NOF/U: HCPCS | Performed by: PODIATRIST

## 2019-10-28 PROCEDURE — G8484 FLU IMMUNIZE NO ADMIN: HCPCS | Performed by: PODIATRIST

## 2019-10-28 PROCEDURE — L4360 PNEUMAT WALKING BOOT PRE CST: HCPCS | Performed by: PODIATRIST

## 2019-10-28 PROCEDURE — G8427 DOCREV CUR MEDS BY ELIG CLIN: HCPCS | Performed by: PODIATRIST

## 2019-10-28 RX ORDER — ERYTHROMYCIN AND BENZOYL PEROXIDE 30; 50 MG/G; MG/G
GEL TOPICAL
Qty: 46.6 G | Refills: 1 | Status: SHIPPED | OUTPATIENT
Start: 2019-10-28 | End: 2019-11-27

## 2019-10-29 ENCOUNTER — CLINICAL DOCUMENTATION (OUTPATIENT)
Dept: PODIATRY | Age: 54
End: 2019-10-29

## 2019-10-31 ASSESSMENT — ENCOUNTER SYMPTOMS
BACK PAIN: 0
COLOR CHANGE: 0
NAUSEA: 0
SHORTNESS OF BREATH: 0
DIARRHEA: 0

## 2019-11-04 ENCOUNTER — TELEPHONE (OUTPATIENT)
Dept: PODIATRY | Age: 54
End: 2019-11-04

## 2019-11-11 ENCOUNTER — OFFICE VISIT (OUTPATIENT)
Dept: PODIATRY | Age: 54
End: 2019-11-11
Payer: COMMERCIAL

## 2019-11-11 VITALS — WEIGHT: 207 LBS | HEIGHT: 71 IN | BODY MASS INDEX: 28.98 KG/M2

## 2019-11-11 DIAGNOSIS — L08.89 PITTED KERATOLYSIS: Primary | ICD-10-CM

## 2019-11-11 DIAGNOSIS — M79.671 PAIN IN RIGHT FOOT: ICD-10-CM

## 2019-11-11 PROCEDURE — 3017F COLORECTAL CA SCREEN DOC REV: CPT | Performed by: PODIATRIST

## 2019-11-11 PROCEDURE — G8419 CALC BMI OUT NRM PARAM NOF/U: HCPCS | Performed by: PODIATRIST

## 2019-11-11 PROCEDURE — 4004F PT TOBACCO SCREEN RCVD TLK: CPT | Performed by: PODIATRIST

## 2019-11-11 PROCEDURE — G8484 FLU IMMUNIZE NO ADMIN: HCPCS | Performed by: PODIATRIST

## 2019-11-11 PROCEDURE — G8427 DOCREV CUR MEDS BY ELIG CLIN: HCPCS | Performed by: PODIATRIST

## 2019-11-11 PROCEDURE — 99213 OFFICE O/P EST LOW 20 MIN: CPT | Performed by: PODIATRIST

## 2019-11-12 ASSESSMENT — ENCOUNTER SYMPTOMS
BACK PAIN: 0
COLOR CHANGE: 0
SHORTNESS OF BREATH: 0
DIARRHEA: 0
NAUSEA: 0

## 2019-11-18 ENCOUNTER — OFFICE VISIT (OUTPATIENT)
Dept: PODIATRY | Age: 54
End: 2019-11-18
Payer: COMMERCIAL

## 2019-11-18 VITALS — BODY MASS INDEX: 29.63 KG/M2 | HEIGHT: 70 IN | WEIGHT: 207 LBS

## 2019-11-18 DIAGNOSIS — M79.671 PAIN IN RIGHT FOOT: ICD-10-CM

## 2019-11-18 DIAGNOSIS — L08.89 PITTED KERATOLYSIS: Primary | ICD-10-CM

## 2019-11-18 PROCEDURE — 99213 OFFICE O/P EST LOW 20 MIN: CPT | Performed by: PODIATRIST

## 2019-11-18 PROCEDURE — G8484 FLU IMMUNIZE NO ADMIN: HCPCS | Performed by: PODIATRIST

## 2019-11-18 PROCEDURE — 4004F PT TOBACCO SCREEN RCVD TLK: CPT | Performed by: PODIATRIST

## 2019-11-18 PROCEDURE — 3017F COLORECTAL CA SCREEN DOC REV: CPT | Performed by: PODIATRIST

## 2019-11-18 PROCEDURE — G8419 CALC BMI OUT NRM PARAM NOF/U: HCPCS | Performed by: PODIATRIST

## 2019-11-18 PROCEDURE — G8427 DOCREV CUR MEDS BY ELIG CLIN: HCPCS | Performed by: PODIATRIST

## 2019-11-18 RX ORDER — ERYTHROMYCIN 20 MG/G
GEL TOPICAL
Qty: 1 TUBE | Refills: 2 | Status: SHIPPED | OUTPATIENT
Start: 2019-11-18 | End: 2020-11-13 | Stop reason: ALTCHOICE

## 2019-11-19 ASSESSMENT — ENCOUNTER SYMPTOMS
SHORTNESS OF BREATH: 0
DIARRHEA: 0
BACK PAIN: 0
NAUSEA: 0
COLOR CHANGE: 0

## 2019-11-20 ENCOUNTER — TELEPHONE (OUTPATIENT)
Dept: PODIATRY | Age: 54
End: 2019-11-20

## 2019-12-05 ENCOUNTER — OFFICE VISIT (OUTPATIENT)
Dept: PODIATRY | Age: 54
End: 2019-12-05
Payer: COMMERCIAL

## 2019-12-05 VITALS — HEIGHT: 70 IN | WEIGHT: 207 LBS | BODY MASS INDEX: 29.63 KG/M2

## 2019-12-05 DIAGNOSIS — L08.89 PITTED KERATOLYSIS: Primary | ICD-10-CM

## 2019-12-05 DIAGNOSIS — L84 CORNS AND CALLOSITIES: ICD-10-CM

## 2019-12-05 DIAGNOSIS — M79.671 PAIN IN RIGHT FOOT: ICD-10-CM

## 2019-12-05 PROCEDURE — 4004F PT TOBACCO SCREEN RCVD TLK: CPT | Performed by: PODIATRIST

## 2019-12-05 PROCEDURE — G8484 FLU IMMUNIZE NO ADMIN: HCPCS | Performed by: PODIATRIST

## 2019-12-05 PROCEDURE — 11055 PARING/CUTG B9 HYPRKER LES 1: CPT | Performed by: PODIATRIST

## 2019-12-05 PROCEDURE — G8419 CALC BMI OUT NRM PARAM NOF/U: HCPCS | Performed by: PODIATRIST

## 2019-12-05 PROCEDURE — 99213 OFFICE O/P EST LOW 20 MIN: CPT | Performed by: PODIATRIST

## 2019-12-05 PROCEDURE — G8427 DOCREV CUR MEDS BY ELIG CLIN: HCPCS | Performed by: PODIATRIST

## 2019-12-05 PROCEDURE — 3017F COLORECTAL CA SCREEN DOC REV: CPT | Performed by: PODIATRIST

## 2019-12-05 ASSESSMENT — ENCOUNTER SYMPTOMS
COLOR CHANGE: 0
DIARRHEA: 0
NAUSEA: 0
BACK PAIN: 0
SHORTNESS OF BREATH: 0

## 2020-01-06 ENCOUNTER — OFFICE VISIT (OUTPATIENT)
Dept: PODIATRY | Age: 55
End: 2020-01-06
Payer: COMMERCIAL

## 2020-01-06 VITALS — BODY MASS INDEX: 29.63 KG/M2 | WEIGHT: 207 LBS | HEIGHT: 70 IN

## 2020-01-06 PROCEDURE — 3017F COLORECTAL CA SCREEN DOC REV: CPT | Performed by: PODIATRIST

## 2020-01-06 PROCEDURE — G8419 CALC BMI OUT NRM PARAM NOF/U: HCPCS | Performed by: PODIATRIST

## 2020-01-06 PROCEDURE — 4004F PT TOBACCO SCREEN RCVD TLK: CPT | Performed by: PODIATRIST

## 2020-01-06 PROCEDURE — 99213 OFFICE O/P EST LOW 20 MIN: CPT | Performed by: PODIATRIST

## 2020-01-06 PROCEDURE — G8484 FLU IMMUNIZE NO ADMIN: HCPCS | Performed by: PODIATRIST

## 2020-01-06 PROCEDURE — G8427 DOCREV CUR MEDS BY ELIG CLIN: HCPCS | Performed by: PODIATRIST

## 2020-01-07 ASSESSMENT — ENCOUNTER SYMPTOMS
BACK PAIN: 0
NAUSEA: 0
SHORTNESS OF BREATH: 0
DIARRHEA: 0
COLOR CHANGE: 0

## 2020-01-07 NOTE — PROGRESS NOTES
with the topical erythromycin gel. Patient also encouraged to spray out his shoes with Lysol and to change his socks midway through his work shift. 3. Return in about 2 weeks (around 1/20/2020) for evaluation of pitted keratolysis.    1/6/2020      Rob Black DPM

## 2020-01-15 ENCOUNTER — OFFICE VISIT (OUTPATIENT)
Dept: FAMILY MEDICINE CLINIC | Age: 55
End: 2020-01-15
Payer: COMMERCIAL

## 2020-01-15 VITALS
SYSTOLIC BLOOD PRESSURE: 120 MMHG | HEART RATE: 82 BPM | WEIGHT: 201.8 LBS | OXYGEN SATURATION: 98 % | HEIGHT: 71 IN | BODY MASS INDEX: 28.25 KG/M2 | DIASTOLIC BLOOD PRESSURE: 78 MMHG

## 2020-01-15 PROBLEM — J44.1 COPD WITH ACUTE EXACERBATION (HCC): Status: ACTIVE | Noted: 2020-01-15

## 2020-01-15 PROCEDURE — G8484 FLU IMMUNIZE NO ADMIN: HCPCS | Performed by: FAMILY MEDICINE

## 2020-01-15 PROCEDURE — 4004F PT TOBACCO SCREEN RCVD TLK: CPT | Performed by: FAMILY MEDICINE

## 2020-01-15 PROCEDURE — 99214 OFFICE O/P EST MOD 30 MIN: CPT | Performed by: FAMILY MEDICINE

## 2020-01-15 PROCEDURE — 3023F SPIROM DOC REV: CPT | Performed by: FAMILY MEDICINE

## 2020-01-15 PROCEDURE — G8419 CALC BMI OUT NRM PARAM NOF/U: HCPCS | Performed by: FAMILY MEDICINE

## 2020-01-15 PROCEDURE — G8926 SPIRO NO PERF OR DOC: HCPCS | Performed by: FAMILY MEDICINE

## 2020-01-15 PROCEDURE — G8427 DOCREV CUR MEDS BY ELIG CLIN: HCPCS | Performed by: FAMILY MEDICINE

## 2020-01-15 PROCEDURE — 3017F COLORECTAL CA SCREEN DOC REV: CPT | Performed by: FAMILY MEDICINE

## 2020-01-15 RX ORDER — CEFUROXIME AXETIL 500 MG/1
500 TABLET ORAL EVERY 12 HOURS
Qty: 20 TABLET | Refills: 0 | Status: SHIPPED | OUTPATIENT
Start: 2020-01-15 | End: 2020-01-25

## 2020-01-15 RX ORDER — IPRATROPIUM BROMIDE AND ALBUTEROL SULFATE 2.5; .5 MG/3ML; MG/3ML
1 SOLUTION RESPIRATORY (INHALATION) EVERY 6 HOURS PRN
Qty: 360 ML | Refills: 3 | Status: SHIPPED | OUTPATIENT
Start: 2020-01-15 | End: 2020-11-13 | Stop reason: SDUPTHER

## 2020-01-15 RX ORDER — PREDNISONE 10 MG/1
50 TABLET ORAL DAILY
Qty: 35 TABLET | Refills: 0 | Status: SHIPPED | OUTPATIENT
Start: 2020-01-15 | End: 2020-01-22

## 2020-01-15 RX ORDER — OMEPRAZOLE 40 MG/1
40 CAPSULE, DELAYED RELEASE ORAL
Qty: 90 CAPSULE | Refills: 1 | Status: SHIPPED | OUTPATIENT
Start: 2020-01-15 | End: 2020-06-24 | Stop reason: SDUPTHER

## 2020-01-15 RX ORDER — NAPROXEN 250 MG/1
250 TABLET ORAL 2 TIMES DAILY PRN
Qty: 180 TABLET | Refills: 1 | Status: SHIPPED | OUTPATIENT
Start: 2020-01-15 | End: 2020-11-13 | Stop reason: SDUPTHER

## 2020-01-15 RX ORDER — ACETAMINOPHEN 500 MG
500 TABLET ORAL EVERY 6 HOURS PRN
Qty: 120 TABLET | Refills: 3 | Status: SHIPPED | OUTPATIENT
Start: 2020-01-15 | End: 2020-07-29

## 2020-01-15 RX ORDER — ALBUTEROL SULFATE 90 UG/1
AEROSOL, METERED RESPIRATORY (INHALATION)
Qty: 18 G | Refills: 2 | Status: SHIPPED | OUTPATIENT
Start: 2020-01-15 | End: 2020-11-13 | Stop reason: SDUPTHER

## 2020-01-15 ASSESSMENT — ASTHMA QUESTIONNAIRES
QUESTION_1 LAST FOUR WEEKS HOW MUCH OF THE TIME DID YOUR ASTHMA KEEP YOU FROM GETTING AS MUCH DONE AT WORK, SCHOOL OR AT HOME: 5
QUESTION_3 LAST FOUR WEEKS HOW OFTEN DID YOUR ASTHMA SYMPTOMS (WHEEZING, COUGHING, SHORTNESS OF BREATH, CHEST TIGHTNESS OR PAIN) WAKE YOU UP AT NIGHT OR EARLIER THAN USUAL IN THE MORNING: 3
QUESTION_5 LAST FOUR WEEKS HOW WOULD YOU RATE YOUR ASTHMA CONTROL: 3
ACT_TOTALSCORE: 19
QUESTION_4 LAST FOUR WEEKS HOW OFTEN HAVE YOU USED YOUR RESCUE INHALER OR NEBULIZER MEDICATION (SUCH AS ALBUTEROL): 3
QUESTION_2 LAST FOUR WEEKS HOW OFTEN HAVE YOU HAD SHORTNESS OF BREATH: 5

## 2020-01-15 ASSESSMENT — ENCOUNTER SYMPTOMS
WHEEZING: 1
SHORTNESS OF BREATH: 1
CONSTIPATION: 0
SINUS PRESSURE: 0
COUGH: 1
VOICE CHANGE: 1
RHINORRHEA: 1
VOMITING: 0
ABDOMINAL PAIN: 0
COLOR CHANGE: 0
DIARRHEA: 0
SINUS PAIN: 0
NAUSEA: 0
ABDOMINAL DISTENTION: 0
CHEST TIGHTNESS: 1

## 2020-01-15 ASSESSMENT — PATIENT HEALTH QUESTIONNAIRE - PHQ9
2. FEELING DOWN, DEPRESSED OR HOPELESS: 0
1. LITTLE INTEREST OR PLEASURE IN DOING THINGS: 0
SUM OF ALL RESPONSES TO PHQ QUESTIONS 1-9: 0
SUM OF ALL RESPONSES TO PHQ QUESTIONS 1-9: 0
SUM OF ALL RESPONSES TO PHQ9 QUESTIONS 1 & 2: 0

## 2020-01-15 NOTE — PATIENT INSTRUCTIONS
Patient Education        Chronic Obstructive Pulmonary Disease (COPD): Care Instructions  Your Care Instructions    Chronic obstructive pulmonary disease (COPD) is a general term for a group of lung diseases, including emphysema and chronic bronchitis. People with COPD have decreased airflow in and out of the lungs, which makes it hard to breathe. The airways also can get clogged with thick mucus. Cigarette smoking is a major cause of COPD. Although there is no cure for COPD, you can slow its progress. Following your treatment plan and taking care of yourself can help you feel better and live longer. Follow-up care is a key part of your treatment and safety. Be sure to make and go to all appointments, and call your doctor if you are having problems. It's also a good idea to know your test results and keep a list of the medicines you take. How can you care for yourself at home?   Staying healthy    · Do not smoke. This is the most important step you can take to prevent more damage to your lungs. If you need help quitting, talk to your doctor about stop-smoking programs and medicines. These can increase your chances of quitting for good.     · Avoid colds and flu. Get a pneumococcal vaccine shot. If you have had one before, ask your doctor whether you need a second dose. Get the flu vaccine every fall. If you must be around people with colds or the flu, wash your hands often.     · Avoid secondhand smoke, air pollution, and high altitudes. Also avoid cold, dry air and hot, humid air. Stay at home with your windows closed when air pollution is bad.    Medicines and oxygen therapy    · Take your medicines exactly as prescribed. Call your doctor if you think you are having a problem with your medicine.     · You may be taking medicines such as:  ? Bronchodilators. These help open your airways and make breathing easier. Bronchodilators are either short-acting (work for 6 to 9 hours) or long-acting (work for 24 hours). · Eat foods that contain protein so that you do not lose muscle mass.     · Talk with your doctor if you gain too much weight or if you lose weight without trying.    Mental health    · Talk to your family, friends, or a therapist about your feelings. It is normal to feel frightened, angry, hopeless, helpless, and even guilty. Talking openly about bad feelings can help you cope. If these feelings last, talk to your doctor. When should you call for help? Call 911 anytime you think you may need emergency care. For example, call if:    · You have severe trouble breathing.    Call your doctor now or seek immediate medical care if:    · You have new or worse trouble breathing.     · You cough up blood.     · You have a fever.    Watch closely for changes in your health, and be sure to contact your doctor if:    · You cough more deeply or more often, especially if you notice more mucus or a change in the color of your mucus.     · You have new or worse swelling in your legs or belly.     · You are not getting better as expected. Where can you learn more? Go to https://PeoplePerHour.com.[employer]. org and sign in to your [employer] account. Enter A576 in the [facility] box to learn more about \"Chronic Obstructive Pulmonary Disease (COPD): Care Instructions. \"     If you do not have an account, please click on the \"Sign Up Now\" link. Current as of: June 9, 2019  Content Version: 12.3  © 8053-5435 Healthwise, Incorporated. Care instructions adapted under license by [facility] ([facility]). If you have questions about a medical condition or this instruction, always ask your healthcare professional. [address] any warranty or liability for your use of this information. Patient Education        COPD Exacerbation Plan: Care Instructions  Your Care Instructions    If you have chronic obstructive pulmonary disease (COPD), your usual shortness of breath could suddenly get worse.  You may start help quitting, talk to your doctor about stop-smoking programs and medicines. These can increase your chances of quitting for good. · Take your daily medicines as prescribed. · Avoid colds and flu. ? Get a pneumococcal vaccine. ? Get a flu vaccine each year, as soon as it is available. Ask those you live or work with to do the same, so they will not get the flu and infect you. ? Try to stay away from people with colds or the flu. ? Wash your hands often. · Avoid secondhand smoke; air pollution; cold, dry air; hot, humid air; and high altitudes. Stay at home with your windows closed when air pollution is bad. · Learn breathing techniques for COPD, such as breathing through pursed lips. These techniques can help you breathe easier during an exacerbation. When should you call for help? Call 911 anytime you think you may need emergency care. For example, call if:    · You have severe trouble breathing.     · You have severe chest pain.    Call your doctor now or seek immediate medical care if:    · You have new or worse shortness of breath.     · You develop new chest pain.     · You are coughing more deeply or more often, especially if you notice more mucus or a change in the color of your mucus.     · You cough up blood.     · You have new or increased swelling in your legs or belly.     · You have a fever.    Watch closely for changes in your health, and be sure to contact your doctor if:    · You need to use your antibiotic or steroid pills.     · Your symptoms are getting worse. Where can you learn more? Go to https://meetsjared.[employer]. org and sign in to your [employer] account. Enter F837 in the [employer] box to learn more about \"COPD Exacerbation Plan: Care Instructions. \"     If you do not have an account, please click on the \"Sign Up Now\" link. Current as of: June 9, 2019  Content Version: 12.3  © 6519-9242 Healthwise, Incorporated.  Care instructions adapted under license by

## 2020-01-20 ENCOUNTER — OFFICE VISIT (OUTPATIENT)
Dept: PODIATRY | Age: 55
End: 2020-01-20
Payer: COMMERCIAL

## 2020-01-20 VITALS — BODY MASS INDEX: 28.98 KG/M2 | HEIGHT: 71 IN | WEIGHT: 207 LBS

## 2020-01-20 PROCEDURE — G8484 FLU IMMUNIZE NO ADMIN: HCPCS | Performed by: PODIATRIST

## 2020-01-20 PROCEDURE — 99213 OFFICE O/P EST LOW 20 MIN: CPT | Performed by: PODIATRIST

## 2020-01-20 PROCEDURE — G8419 CALC BMI OUT NRM PARAM NOF/U: HCPCS | Performed by: PODIATRIST

## 2020-01-20 PROCEDURE — 4004F PT TOBACCO SCREEN RCVD TLK: CPT | Performed by: PODIATRIST

## 2020-01-20 PROCEDURE — 3017F COLORECTAL CA SCREEN DOC REV: CPT | Performed by: PODIATRIST

## 2020-01-20 PROCEDURE — G8427 DOCREV CUR MEDS BY ELIG CLIN: HCPCS | Performed by: PODIATRIST

## 2020-01-21 ASSESSMENT — ENCOUNTER SYMPTOMS
DIARRHEA: 0
SHORTNESS OF BREATH: 0
NAUSEA: 0
COLOR CHANGE: 0
BACK PAIN: 0

## 2020-02-26 ENCOUNTER — HOSPITAL ENCOUNTER (OUTPATIENT)
Dept: GENERAL RADIOLOGY | Facility: CLINIC | Age: 55
Discharge: HOME | End: 2020-02-28
Payer: COMMERCIAL

## 2020-02-26 ENCOUNTER — HOSPITAL ENCOUNTER (OUTPATIENT)
Facility: CLINIC | Age: 55
Discharge: HOME | End: 2020-02-28
Payer: COMMERCIAL

## 2020-02-26 ENCOUNTER — HOSPITAL ENCOUNTER (OUTPATIENT)
Age: 55
Setting detail: SPECIMEN
Discharge: HOME | End: 2020-02-26
Payer: COMMERCIAL

## 2020-02-26 LAB
ALBUMIN SERPL-MCNC: 4.4 G/DL (ref 3.5–5.2)
ALBUMIN/GLOBULIN RATIO: 1.5 (ref 1–2.5)
ALP BLD-CCNC: 75 U/L (ref 40–129)
ALT SERPL-CCNC: 19 U/L (ref 5–41)
ANION GAP SERPL CALCULATED.3IONS-SCNC: 13 MMOL/L (ref 9–17)
AST SERPL-CCNC: 19 U/L
BILIRUB SERPL-MCNC: 0.4 MG/DL (ref 0.3–1.2)
BUN BLDV-MCNC: 17 MG/DL (ref 6–20)
BUN/CREAT BLD: NORMAL (ref 9–20)
CALCIUM SERPL-MCNC: 10 MG/DL (ref 8.6–10.4)
CHLORIDE BLD-SCNC: 103 MMOL/L (ref 98–107)
CHOLESTEROL/HDL RATIO: 1.7
CHOLESTEROL: 194 MG/DL
CO2: 24 MMOL/L (ref 20–31)
CREAT SERPL-MCNC: 0.91 MG/DL (ref 0.7–1.2)
FOLATE: 10.1 NG/ML
GFR AFRICAN AMERICAN: >60 ML/MIN
GFR NON-AFRICAN AMERICAN: >60 ML/MIN
GFR SERPL CREATININE-BSD FRML MDRD: NORMAL ML/MIN/{1.73_M2}
GFR SERPL CREATININE-BSD FRML MDRD: NORMAL ML/MIN/{1.73_M2}
GLUCOSE BLD-MCNC: 90 MG/DL (ref 70–99)
HCT VFR BLD CALC: 45.7 % (ref 40.7–50.3)
HDLC SERPL-MCNC: 112 MG/DL
HEMOGLOBIN: 15.2 G/DL (ref 13–17)
LDL CHOLESTEROL: 73 MG/DL (ref 0–130)
MCH RBC QN AUTO: 30.4 PG (ref 25.2–33.5)
MCHC RBC AUTO-ENTMCNC: 33.3 G/DL (ref 28.4–34.8)
MCV RBC AUTO: 91.4 FL (ref 82.6–102.9)
NRBC AUTOMATED: 0 PER 100 WBC
PDW BLD-RTO: 13.7 % (ref 11.8–14.4)
PLATELET # BLD: 269 K/UL (ref 138–453)
PMV BLD AUTO: 9.7 FL (ref 8.1–13.5)
POTASSIUM SERPL-SCNC: 4.9 MMOL/L (ref 3.7–5.3)
RBC # BLD: 5 M/UL (ref 4.21–5.77)
SODIUM BLD-SCNC: 140 MMOL/L (ref 135–144)
TOTAL PROTEIN: 7.3 G/DL (ref 6.4–8.3)
TRIGL SERPL-MCNC: 43 MG/DL
TSH SERPL DL<=0.05 MIU/L-ACNC: 1.65 MIU/L (ref 0.3–5)
VITAMIN B-12: 426 PG/ML (ref 232–1245)
VLDLC SERPL CALC-MCNC: NORMAL MG/DL (ref 1–30)
WBC # BLD: 5.2 K/UL (ref 3.5–11.3)

## 2020-02-26 PROCEDURE — 71046 X-RAY EXAM CHEST 2 VIEWS: CPT

## 2020-02-26 RX ORDER — PREDNISONE 10 MG/1
50 TABLET ORAL DAILY
Qty: 35 TABLET | Refills: 0 | Status: SHIPPED | OUTPATIENT
Start: 2020-02-26 | End: 2020-03-04

## 2020-02-26 RX ORDER — CEFUROXIME AXETIL 500 MG/1
500 TABLET ORAL EVERY 12 HOURS
Qty: 20 TABLET | Refills: 0 | Status: SHIPPED | OUTPATIENT
Start: 2020-02-26 | End: 2020-03-07

## 2020-02-26 RX ORDER — AMLODIPINE BESYLATE 10 MG/1
TABLET ORAL
Qty: 90 TABLET | Refills: 2 | Status: SHIPPED | OUTPATIENT
Start: 2020-02-26 | End: 2020-11-13 | Stop reason: SDUPTHER

## 2020-02-26 NOTE — TELEPHONE ENCOUNTER
Please Approve or Refuse.   Send to Pharmacy per Pt's Request:      Next Visit Date:  4/29/2020   Last Visit Date: 1/15/2020    No results found for: LABA1C          ( goal A1C is < 7)   BP Readings from Last 3 Encounters:   01/15/20 120/78   08/29/19 132/84   04/30/19 121/78          (goal 120/80)  BUN   Date Value Ref Range Status   02/26/2020 17 6 - 20 mg/dL Final     CREATININE   Date Value Ref Range Status   02/26/2020 0.91 0.70 - 1.20 mg/dL Final     Potassium   Date Value Ref Range Status   02/26/2020 4.9 3.7 - 5.3 mmol/L Final

## 2020-06-03 ENCOUNTER — TELEPHONE (OUTPATIENT)
Dept: FAMILY MEDICINE CLINIC | Age: 55
End: 2020-06-03

## 2020-06-24 RX ORDER — OMEPRAZOLE 40 MG/1
40 CAPSULE, DELAYED RELEASE ORAL
Qty: 90 CAPSULE | Refills: 3 | Status: SHIPPED
Start: 2020-06-24 | End: 2020-07-29 | Stop reason: SINTOL

## 2020-07-29 ENCOUNTER — OFFICE VISIT (OUTPATIENT)
Dept: FAMILY MEDICINE CLINIC | Age: 55
End: 2020-07-29
Payer: COMMERCIAL

## 2020-07-29 VITALS
HEART RATE: 85 BPM | BODY MASS INDEX: 28.14 KG/M2 | HEIGHT: 71 IN | OXYGEN SATURATION: 97 % | DIASTOLIC BLOOD PRESSURE: 77 MMHG | SYSTOLIC BLOOD PRESSURE: 127 MMHG | WEIGHT: 201 LBS | TEMPERATURE: 99 F

## 2020-07-29 PROBLEM — F17.200 CURRENT SMOKER: Status: ACTIVE | Noted: 2020-07-29

## 2020-07-29 PROBLEM — J20.9 BRONCHITIS, ACUTE, WITH BRONCHOSPASM: Status: ACTIVE | Noted: 2020-07-29

## 2020-07-29 PROCEDURE — 3023F SPIROM DOC REV: CPT | Performed by: FAMILY MEDICINE

## 2020-07-29 PROCEDURE — G8427 DOCREV CUR MEDS BY ELIG CLIN: HCPCS | Performed by: FAMILY MEDICINE

## 2020-07-29 PROCEDURE — 4004F PT TOBACCO SCREEN RCVD TLK: CPT | Performed by: FAMILY MEDICINE

## 2020-07-29 PROCEDURE — G8419 CALC BMI OUT NRM PARAM NOF/U: HCPCS | Performed by: FAMILY MEDICINE

## 2020-07-29 PROCEDURE — G8926 SPIRO NO PERF OR DOC: HCPCS | Performed by: FAMILY MEDICINE

## 2020-07-29 PROCEDURE — 99213 OFFICE O/P EST LOW 20 MIN: CPT | Performed by: FAMILY MEDICINE

## 2020-07-29 PROCEDURE — 3017F COLORECTAL CA SCREEN DOC REV: CPT | Performed by: FAMILY MEDICINE

## 2020-07-29 RX ORDER — SUCRALFATE 1 G/1
1 TABLET ORAL 4 TIMES DAILY
Qty: 120 TABLET | Refills: 3 | Status: SHIPPED
Start: 2020-07-29 | End: 2021-03-24 | Stop reason: HOSPADM

## 2020-07-29 RX ORDER — AZITHROMYCIN 250 MG/1
250 TABLET, FILM COATED ORAL SEE ADMIN INSTRUCTIONS
Qty: 6 TABLET | Refills: 0 | Status: SHIPPED | OUTPATIENT
Start: 2020-07-29 | End: 2020-08-03

## 2020-07-29 RX ORDER — METHYLPREDNISOLONE 4 MG/1
TABLET ORAL
Qty: 1 KIT | Refills: 0 | Status: SHIPPED | OUTPATIENT
Start: 2020-07-29 | End: 2020-11-13 | Stop reason: ALTCHOICE

## 2020-07-29 RX ORDER — PANTOPRAZOLE SODIUM 20 MG/1
20 TABLET, DELAYED RELEASE ORAL 2 TIMES DAILY
Qty: 30 TABLET | Refills: 3 | Status: SHIPPED | OUTPATIENT
Start: 2020-07-29 | End: 2020-11-13 | Stop reason: SDUPTHER

## 2020-07-29 ASSESSMENT — ENCOUNTER SYMPTOMS
VOICE CHANGE: 0
SHORTNESS OF BREATH: 1
DIARRHEA: 0
NAUSEA: 0
WHEEZING: 1
RHINORRHEA: 0
COUGH: 1
CONSTIPATION: 0
ABDOMINAL PAIN: 1
SINUS PRESSURE: 0
SINUS PAIN: 0
COLOR CHANGE: 0
CHEST TIGHTNESS: 1
VOMITING: 0
ABDOMINAL DISTENTION: 0

## 2020-07-29 NOTE — PATIENT INSTRUCTIONS
Patient Education        Bronchitis: Care Instructions  Your Care Instructions     Bronchitis is inflammation of the bronchial tubes, which carry air to the lungs. The tubes swell and produce mucus, or phlegm. The mucus and inflamed bronchial tubes make you cough. You may have trouble breathing. Most cases of bronchitis are caused by viruses like those that cause colds. Antibiotics usually do not help and they may be harmful. Bronchitis usually develops rapidly and lasts about 2 to 3 weeks in otherwise healthy people. Follow-up care is a key part of your treatment and safety. Be sure to make and go to all appointments, and call your doctor if you are having problems. It's also a good idea to know your test results and keep a list of the medicines you take. How can you care for yourself at home? · Take all medicines exactly as prescribed. Call your doctor if you think you are having a problem with your medicine. · Get some extra rest.  · Take an over-the-counter pain medicine, such as acetaminophen (Tylenol), ibuprofen (Advil, Motrin), or naproxen (Aleve) to reduce fever and relieve body aches. Read and follow all instructions on the label. · Do not take two or more pain medicines at the same time unless the doctor told you to. Many pain medicines have acetaminophen, which is Tylenol. Too much acetaminophen (Tylenol) can be harmful. · Take an over-the-counter cough medicine that contains dextromethorphan to help quiet a dry, hacking cough so that you can sleep. Avoid cough medicines that have more than one active ingredient. Read and follow all instructions on the label. · Breathe moist air from a humidifier, hot shower, or sink filled with hot water. The heat and moisture will thin mucus so you can cough it out. · Do not smoke. Smoking can make bronchitis worse. If you need help quitting, talk to your doctor about stop-smoking programs and medicines.  These can increase your chances of quitting for good.  When should you call for help? [id number] anytime you think you may need emergency care. For example, call if:  · You have severe trouble breathing. Call your doctor now or seek immediate medical care if:  · You have new or worse trouble breathing. · You cough up dark brown or bloody mucus (sputum). · You have a new or higher fever. · You have a new rash. Watch closely for changes in your health, and be sure to contact your doctor if:  · You cough more deeply or more often, especially if you notice more mucus or a change in the color of your mucus. · You are not getting better as expected. Where can you learn more? Go to [email or web address] org and sign in to your [employer] account. Enter H333 in the "Mobile Location, IP" box to learn more about \"Bronchitis: Care Instructions. \"     If you do not have an account, please click on the \"Sign Up Now\" link. Current as of: February 24, 2020               Content Version: 12.5  © 2006-2020 [employer]. Care instructions adapted under license by [facility] ([facility]). If you have questions about a medical condition or this instruction, always ask your healthcare professional. [place] [address] any warranty or liability for your use of this information.

## 2020-07-29 NOTE — PROGRESS NOTES
Pt here for a check up. Pt wondering about a good OTC multivitamin he could use. Pt still getting GERD             Visit Information    Have you changed or started any medications since your last visit including any over-the-counter medicines, vitamins, or herbal medicines? no   Have you stopped taking any of your medications? Is so, why? -  no  Are you having any side effects from any of your medications? - no    Have you seen any other physician or provider since your last visit?  no   Have you had any other diagnostic tests since your last visit?  no   Have you been seen in the emergency room and/or had an admission in a hospital since we last saw you?  no   Have you had your routine dental cleaning in the past 6 months?  no     Do you have an active MyChart account? If no, what is the barrier?   Yes    Patient Care Team:  Ritesh Carney MD as PCP - General (Family Medicine)  Ritesh Carney MD as PCP - [address] [place] [address] [facility]  Yogesh Bell DPM as Consulting Physician (Podiatry)  Arpit De La Fuente MD as Consulting Physician (Orthopedic Surgery)    Medical History Review  Past Medical, Family, and Social History reviewed and does not contribute to the patient presenting condition    Health Maintenance   Topic Date Due    DTaP/Tdap/Td vaccine (1 - Tdap) 10/17/1985    Shingles Vaccine (1 of 2) 10/17/2016    Colon Cancer Screen FIT/FOBT  05/12/2018    Flu vaccine (1) 09/01/2020    Potassium monitoring  02/26/2021    Creatinine monitoring  02/26/2021    Lipid screen  02/26/2025    Pneumococcal 0-64 years Vaccine  Completed    HIV screen  Completed    Hepatitis A vaccine  Aged Out    Hepatitis B vaccine  Aged Out    Hib vaccine  Aged Out    Meningococcal (ACWY) vaccine  Aged Out

## 2020-07-29 NOTE — PROGRESS NOTES
Gastroesophageal reflux disease without esophagitis    Allergic rhinitis    Pansinusitis    Essential hypertension    Knee pain    Penicillin allergy    Vitamin B 12 deficiency    Pain and swelling of knee    Chronic pain of both knees    Primary osteoarthritis of both knees pe Xrays    Varicose veins of bilateral lower extremities with other complications    Raynaud's disease without gangrene    Right leg numbness    COPD with acute exacerbation (HCC)    Bronchitis, acute, with bronchospasm    Current smoker      Past Medical History:   Diagnosis Date    Alcohol abuse     Allergic rhinitis     Asthma     COPD (chronic obstructive pulmonary disease) (HCC)     GERD (gastroesophageal reflux disease)     Hyperlipidemia with target LDL less than 100 8/29/2019    Hypertension     Pansinusitis     Primary osteoarthritis of both knees pe Xrays 8/30/2018    History reviewed. No pertinent surgical history. Family History   Problem Relation Age of Onset    Allergies Daughter     Depression Daughter      Current Outpatient Medications   Medication Sig Dispense Refill    pantoprazole (PROTONIX) 20 MG tablet Take 1 tablet by mouth 2 times daily 30 tablet 3    sucralfate (CARAFATE) 1 GM tablet Take 1 tablet by mouth 4 times daily 120 tablet 3    azithromycin (ZITHROMAX) 250 MG tablet Take 1 tablet by mouth See Admin Instructions for 5 days 500mg on day 1 followed by 250mg on days 2 - 5 6 tablet 0    methylPREDNISolone (MEDROL DOSEPACK) 4 MG tablet Take by mouth. 1 kit 0    fluticasone-salmeterol (ADVAIR DISKUS) 500-50 MCG/DOSE diskus inhaler Inhale 1 puff into the lungs every 12 hours 60 each 5    amLODIPine (NORVASC) 10 MG tablet TAKE ONE TABLET BY MOUTH DAILY 90 tablet 2    aluminum chloride (DRYSOL) 20 % external solution Apply topically nightly.  60 mL 3    albuterol sulfate HFA (VENTOLIN HFA) 108 (90 Base) MCG/ACT inhaler INHALE TWO PUFFS BY MOUTH EVERY 6 HOURS AS NEEDED FOR WHEEZING OR FOR SHORTNESS OF BREATH (COUGH) 18 g 2    naproxen (NAPROSYN) 250 MG tablet Take 1 tablet by mouth 2 times daily as needed for Pain 180 tablet 1    erythromycin with ethanol (EMGEL) 2 % gel Apply topically daily. 1 Tube 2    Elastic Bandages & Supports (KNEE BRACE/HINGED BARS LARGE) MISC Needs knee brace bilaterally, hinged. 2 each 3    ipratropium-albuterol (DUONEB) 0.5-2.5 (3) MG/3ML SOLN nebulizer solution Take 3 mLs by nebulization every 6 hours as needed for Shortness of Breath or Other (dyspnea, wheezing) (Patient not taking: Reported on 7/29/2020) 360 mL 3     No current facility-administered medications for this visit. Review of Systems   Constitutional: Negative for activity change, appetite change, chills, diaphoresis, fatigue, fever and unexpected weight change. HENT: Negative for congestion, postnasal drip, rhinorrhea, sinus pressure, sinus pain and voice change. Respiratory: Positive for cough, chest tightness, shortness of breath and wheezing. Cardiovascular: Negative for chest pain, palpitations and leg swelling. Gastrointestinal: Positive for abdominal pain. Negative for abdominal distention, constipation, diarrhea, nausea and vomiting. Belching, heartburn, epigastric pain   Endocrine: Negative for cold intolerance, heat intolerance, polydipsia, polyphagia and polyuria. Musculoskeletal: Negative for arthralgias (knees), gait problem and myalgias. Skin: Negative for color change. Amlodipine helps his Raynaud's syndrome   Allergic/Immunologic: Positive for environmental allergies. Psychiatric/Behavioral: Positive for sleep disturbance. Negative for dysphoric mood. The patient is nervous/anxious. Prior to Visit Medications    Medication Sig Taking?  Authorizing Provider   pantoprazole (PROTONIX) 20 MG tablet Take 1 tablet by mouth 2 times daily Yes Benita Mohr APRN - CNP   sucralfate (CARAFATE) 1 GM tablet Take 1 tablet by mouth 4 times daily Yes Benita DELONG Estimated body mass index is 28.05 kg/m² as calculated from the following:    Height as of this encounter: 5' 10.98\" (1.803 m). Weight as of this encounter: 201 lb (91.2 kg). Physical Exam  Vitals signs and nursing note reviewed. Constitutional:       General: He is not in acute distress. Appearance: He is well-developed. He is not diaphoretic. HENT:      Head: Normocephalic and atraumatic. Right Ear: Tympanic membrane, ear canal and external ear normal.      Left Ear: Tympanic membrane, ear canal and external ear normal.      Nose: Mucosal edema present. No congestion or rhinorrhea. Mouth/Throat:      Mouth: Mucous membranes are moist.      Pharynx: Posterior oropharyngeal erythema present. No oropharyngeal exudate. Eyes:      General: Lids are normal. No scleral icterus. Right eye: No discharge. Left eye: No discharge. Conjunctiva/sclera: Conjunctivae normal.   Neck:      Musculoskeletal: Normal range of motion and neck supple. Thyroid: No thyromegaly. Cardiovascular:      Rate and Rhythm: Normal rate and regular rhythm. Heart sounds: Normal heart sounds. No murmur. Comments: bilateral legs with tortuous varicose veins on the medial aspects  Pulmonary:      Effort: Pulmonary effort is normal. No respiratory distress. Breath sounds: Examination of the right-middle field reveals wheezing. Examination of the left-lower field reveals wheezing. Wheezing present. No decreased breath sounds or rales. Chest:      Chest wall: No tenderness. Abdominal:      General: Bowel sounds are normal. There is no distension. Palpations: Abdomen is soft. Tenderness: There is no abdominal tenderness. Comments: Obese abdomen   Musculoskeletal:         General: Tenderness present. Right knee: He exhibits decreased range of motion. Tenderness found. Medial joint line, lateral joint line and patellar tendon tenderness noted.       Left knee: He exhibits decreased range of motion. Tenderness found. Medial joint line, lateral joint line and patellar tendon tenderness noted. Left lower leg: No edema. Comments: Antalgic gait noted. Coarse crepitus bilateral knees   Lymphadenopathy:      Head:      Right side of head: Submental and submandibular adenopathy present. Left side of head: Submental and submandibular adenopathy present. Skin:     General: Skin is warm and dry. Capillary Refill: Capillary refill takes less than 2 seconds. Findings: No rash. Neurological:      Mental Status: He is alert and oriented to person, place, and time. Cranial Nerves: No cranial nerve deficit. Sensory: No sensory deficit (decreased on the RLE). Motor: No abnormal muscle tone. Coordination: Coordination normal.      Deep Tendon Reflexes: Reflexes normal.   Psychiatric:         Mood and Affect: Mood is anxious. Behavior: Behavior normal.         Thought Content: Thought content normal.         Judgment: Judgment normal.       Most recent labs reviewed with patient, and all questions answered.   Lab Results   Component Value Date    WBC 5.2 02/26/2020    HGB 15.2 02/26/2020    HCT 45.7 02/26/2020    MCV 91.4 02/26/2020     02/26/2020     Lab Results   Component Value Date     02/26/2020    K 4.9 02/26/2020     02/26/2020    CO2 24 02/26/2020    BUN 17 02/26/2020    CREATININE 0.91 02/26/2020    GLUCOSE 90 02/26/2020    CALCIUM 10.0 02/26/2020      Lab Results   Component Value Date    ALT 19 02/26/2020    AST 19 02/26/2020    ALKPHOS 75 02/26/2020    BILITOT 0.40 02/26/2020     Lab Results   Component Value Date    TSH 1.65 02/26/2020     Lab Results   Component Value Date    CHOL 194 02/26/2020    CHOL 162 08/28/2018     Lab Results   Component Value Date    TRIG 43 02/26/2020    TRIG 31 08/28/2018     Lab Results   Component Value Date     02/26/2020     08/28/2018     Lab Results Component Value Date    LDLCHOLESTEROL 73 02/26/2020    LDLCHOLESTEROL 54 08/28/2018     Lab Results   Component Value Date    CHOLHDLRATIO 1.7 02/26/2020    CHOLHDLRATIO 1.6 08/28/2018     No results found for: [facility]   Lab Results   Component Value Date    PRCFLVCN66 587 02/26/2020     Lab Results   Component Value Date    FOLATE 10.1 02/26/2020     No results found for: VITD25  ASSESSMENT/PLAN:  1. Bronchitis, acute, with bronchospasm  Worsening  DISCUSSED AND ADVISED TO:  Rest.  Advised to increase fluid intake. Eat more fruits and vegetables. Take medications as discussed. Report for worsening symptoms. - azithromycin (ZITHROMAX) 250 MG tablet; Take 1 tablet by mouth See Admin Instructions for 5 days 500mg on day 1 followed by 250mg on days 2 - 5  Dispense: 6 tablet; Refill: 0  - methylPREDNISolone (MEDROL DOSEPACK) 4 MG tablet; Take by mouth. Dispense: 1 kit; Refill: 0    2. COPD with chronic bronchitis ([facility] [address]  Failure to Improve  Current treatment plan is effective, no change in therapy. Reviewed use, techniques, schedule and side effects of all inhaled medications  Critical need for compliance with treatment plan to achieve optimal results  Very strongly urged to quit smoking to reduce pulmonary and CVD risk. - Full PFT Study With Bronchodilator; Future    3. Gastroesophageal reflux disease without esophagitis  Stable  Continue Protonix and Sucralfate. DISCUSSED AND ADVISED TO:  Avoid food triggers. Stop eating large meals close to bedtime  Don't eat meals too close to bedtime  Avoid ASA, NSAID's, caffeine, peppermints, alcohol and tobacco.  Report for worsening symptoms. - pantoprazole (PROTONIX) 20 MG tablet; Take 1 tablet by mouth 2 times daily  Dispense: 30 tablet; Refill: 3  - sucralfate (CARAFATE) 1 GM tablet; Take 1 tablet by mouth 4 times daily  Dispense: 120 tablet; Refill: 3    4. Current smoker  Counseling given to quit smoking. Support offered. Hand out given.   Educational material

## 2020-10-26 NOTE — TELEPHONE ENCOUNTER
Please Approve or Refuse.        Next Visit Date:  Visit date not found   Last Visit Date: 1/20/2020    No results found for: LABA1C          ( goal A1C is < 7)   BP Readings from Last 3 Encounters:   07/29/20 127/77   01/15/20 120/78   08/29/19 132/84          (goal 120/80)  BUN   Date Value Ref Range Status   02/26/2020 17 6 - 20 mg/dL Final     CREATININE   Date Value Ref Range Status   02/26/2020 0.91 0.70 - 1.20 mg/dL Final     Potassium   Date Value Ref Range Status   02/26/2020 4.9 3.7 - 5.3 mmol/L Final

## 2020-11-03 ENCOUNTER — APPOINTMENT (OUTPATIENT)
Dept: GENERAL RADIOLOGY | Age: 55
End: 2020-11-03
Payer: COMMERCIAL

## 2020-11-03 ENCOUNTER — HOSPITAL ENCOUNTER (EMERGENCY)
Age: 55
Discharge: HOME | End: 2020-11-03
Attending: EMERGENCY MEDICINE
Payer: COMMERCIAL

## 2020-11-03 VITALS
RESPIRATION RATE: 18 BRPM | WEIGHT: 200 LBS | OXYGEN SATURATION: 95 % | TEMPERATURE: 98.6 F | BODY MASS INDEX: 27.91 KG/M2 | HEART RATE: 65 BPM | DIASTOLIC BLOOD PRESSURE: 85 MMHG | SYSTOLIC BLOOD PRESSURE: 158 MMHG

## 2020-11-03 PROCEDURE — 73610 X-RAY EXAM OF ANKLE: CPT

## 2020-11-03 PROCEDURE — 6370000000 HC RX 637 (ALT 250 FOR IP): Performed by: STUDENT IN AN ORGANIZED HEALTH CARE EDUCATION/TRAINING PROGRAM

## 2020-11-03 PROCEDURE — 99283 EMERGENCY DEPT VISIT LOW MDM: CPT

## 2020-11-03 PROCEDURE — 73590 X-RAY EXAM OF LOWER LEG: CPT

## 2020-11-03 RX ORDER — ACETAMINOPHEN 500 MG
1000 TABLET ORAL ONCE
Status: COMPLETED | OUTPATIENT
Start: 2020-11-03 | End: 2020-11-03

## 2020-11-03 RX ORDER — IBUPROFEN 800 MG/1
800 TABLET ORAL EVERY 8 HOURS PRN
Qty: 30 TABLET | Refills: 0 | Status: SHIPPED | OUTPATIENT
Start: 2020-11-03 | End: 2020-11-13 | Stop reason: ALTCHOICE

## 2020-11-03 RX ORDER — ACETAMINOPHEN 325 MG/1
325 TABLET ORAL EVERY 6 HOURS PRN
Qty: 60 TABLET | Refills: 0 | Status: SHIPPED | OUTPATIENT
Start: 2020-11-03 | End: 2021-03-24 | Stop reason: SDUPTHER

## 2020-11-03 RX ORDER — IBUPROFEN 800 MG/1
800 TABLET ORAL ONCE
Status: COMPLETED | OUTPATIENT
Start: 2020-11-03 | End: 2020-11-03

## 2020-11-03 RX ADMIN — ACETAMINOPHEN 1000 MG: 500 TABLET ORAL at 14:51

## 2020-11-03 RX ADMIN — IBUPROFEN 800 MG: 800 TABLET, FILM COATED ORAL at 14:51

## 2020-11-03 ASSESSMENT — ENCOUNTER SYMPTOMS
ABDOMINAL PAIN: 0
CHEST TIGHTNESS: 0
ABDOMINAL DISTENTION: 0
WHEEZING: 0
SHORTNESS OF BREATH: 0
DIARRHEA: 0
COUGH: 0
BACK PAIN: 0
CONSTIPATION: 0
NAUSEA: 0
TROUBLE SWALLOWING: 0
RHINORRHEA: 0
SORE THROAT: 0
VOMITING: 0

## 2020-11-03 ASSESSMENT — PAIN DESCRIPTION - LOCATION: LOCATION: ANKLE

## 2020-11-03 ASSESSMENT — PAIN DESCRIPTION - PAIN TYPE: TYPE: CHRONIC PAIN

## 2020-11-03 ASSESSMENT — PAIN SCALES - GENERAL: PAINLEVEL_OUTOF10: 10

## 2020-11-03 ASSESSMENT — PAIN DESCRIPTION - ORIENTATION: ORIENTATION: LEFT

## 2020-11-03 NOTE — ED PROVIDER NOTES
[facility] ED  Emergency Department Encounter  Emergency Medicine Resident     Pt Name: Enrique Martínez  MRN: [id number]  [place] 1965  Date of evaluation: 11/3/20  PCP:  Janee Jason MD    CHIEF COMPLAINT       Chief Complaint   Patient presents with    Ankle Pain     Left       HISTORY OFPRESENT ILLNESS  (Location/Symptom, Timing/Onset, Context/Setting, Quality, Duration, Modifying Anais Flirt.)      Enrique Martínez is a 48 y.o. male who presents with concerns for an exacerbation of acute on chronic left lower extremity pain. Patient has chronic knee pain,, and ankle pain which is been occurring for approximately the past 9 years secondary to a work accident which occurred. Patient is he is ambulating, fall a pop in his ankle yesterday, states he has had increased pain with weightbearing and ambulation since that time. Patient denies loss of bowel or bladder function, back pain, neck pain, fever, chills, lightheadedness, dizziness, change in bowel or bladder function. Patient states he has had difficulty ambulating, has been using his wife's cane, which she got from his mother-in-law, firm management of ambulation. PAST MEDICAL / SURGICAL / SOCIAL / FAMILY HISTORY      has a past medical history of Alcohol abuse, Allergic rhinitis, Asthma, COPD (chronic obstructive pulmonary disease) ([address] GERD (gastroesophageal reflux disease), Hyperlipidemia with target LDL less than 100, Hypertension, Pansinusitis, and Primary osteoarthritis of both knees pe Xrays. has no past surgical history on file.      Social History     Socioeconomic History    Marital status:      Spouse name: Not on file    Number of children: Not on file    Years of education: Not on file    Highest education level: Not on file   Occupational History    Not on file   Social Needs    Financial resource strain: Not on file    Food insecurity     Worry: Not on file     Inability: Not on file    Transportation needs     Medical: Not on file     Non-medical: Not on file   Tobacco Use    Smoking status: Current Every Day Smoker     Packs/day: 0.50     Years: 20.00     Pack years: 10.00     Types: Cigarettes    Smokeless tobacco: Never Used   Substance and Sexual Activity    Alcohol use: Yes     Alcohol/week: 12.0 standard drinks     Types: 12 Cans of beer per week     Comment: 6 beers per day    Drug use: No    Sexual activity: Yes     Partners: Female   Lifestyle    Physical activity     Days per week: Not on file     Minutes per session: Not on file    Stress: Not on file   Relationships    Social connections     Talks on phone: Not on file     Gets together: Not on file     Attends Orthodox service: Not on file     Active member of club or organization: Not on file     Attends meetings of clubs or organizations: Not on file     Relationship status: Not on file    Intimate partner violence     Fear of current or ex partner: Not on file     Emotionally abused: Not on file     Physically abused: Not on file     Forced sexual activity: Not on file   Other Topics Concern    Not on file   Social History Narrative    Not on file       Family History   Problem Relation Age of Onset    Allergies Daughter     Depression Daughter         Allergies:  Aspirin; Pcn [penicillins]; Bactrim [sulfamethoxazole-trimethoprim]; and Doxycycline    Home Medications:  Prior to Admission medications    Medication Sig Start Date End Date Taking? Authorizing Provider   acetaminophen (TYLENOL) 325 MG tablet Take 1 tablet by mouth every 6 hours as needed for Pain 11/3/20  Yes Marlen Corbin MD   ibuprofen (ADVIL;MOTRIN) 800 MG tablet Take 1 tablet by mouth every 8 hours as needed for Pain 11/3/20  Yes Marlen Corbin MD   aluminum chloride (DRYSOL) 20 % external solution Apply topically nightly.  10/26/20   Ame Dong DPM   pantoprazole (PROTONIX) 20 MG tablet Take 1 tablet by mouth 2 times daily 7/29/20   JO Adam CNP   sucralfate (CARAFATE) 1 GM tablet Take 1 tablet by mouth 4 times daily 7/29/20   JO Adam CNP   methylPREDNISolone (MEDROL DOSEPACK) 4 MG tablet Take by mouth. 7/29/20   JO Adam CNP   fluticasone-salmeterol (ADVAIR DISKUS) 500-50 MCG/DOSE diskus inhaler Inhale 1 puff into the lungs every 12 hours 6/24/20   Fabi Alan MD   amLODIPine (NORVASC) 10 MG tablet TAKE ONE TABLET BY MOUTH DAILY 2/26/20   Vi Mccarty MD   albuterol sulfate HFA (VENTOLIN HFA) 108 (90 Base) MCG/ACT inhaler INHALE TWO PUFFS BY MOUTH EVERY 6 HOURS AS NEEDED FOR WHEEZING OR FOR SHORTNESS OF BREATH (COUGH) 1/15/20   Fabi Alan MD   ipratropium-albuterol (DUONEB) 0.5-2.5 (3) MG/3ML SOLN nebulizer solution Take 3 mLs by nebulization every 6 hours as needed for Shortness of Breath or Other (dyspnea, wheezing)  Patient not taking: Reported on 7/29/2020 1/15/20   Fabi Alan MD   naproxen (NAPROSYN) 250 MG tablet Take 1 tablet by mouth 2 times daily as needed for Pain 1/15/20   Fabi Alan MD   erythromycin with ethanol (EMGEL) 2 % gel Apply topically daily. 11/18/19   [place] Serum, DPM   Elastic Bandages & Supports (KNEE BRACE/HINGED BARS LARGE) MISC Needs knee brace bilaterally, hinged. 8/29/19   Fabi Alan MD       REVIEW OFSYSTEMS    (2-9 systems for level 4, 10 or more for level 5)      Review of Systems   Constitutional: Negative for chills, diaphoresis, fatigue and fever. HENT: Negative for rhinorrhea, sore throat, tinnitus and trouble swallowing. Eyes: Negative for visual disturbance. Respiratory: Negative for cough, chest tightness, shortness of breath and wheezing. Cardiovascular: Negative for chest pain and leg swelling. Gastrointestinal: Negative for abdominal distention, abdominal pain, constipation, diarrhea, nausea and vomiting. Endocrine: Negative for polyuria.    Genitourinary: Negative for dysuria, flank pain and frequency. Musculoskeletal: Positive for arthralgias (With palpation of the left ankle, left ankle) and myalgias (Left proximal tib-fib). Negative for back pain and joint swelling. Neurological: Negative for dizziness, tremors, seizures, weakness, light-headedness, numbness and headaches. PHYSICAL EXAM   (up to 7 for level 4, 8 or more forlevel 5)      INITIAL VITALS:   ED Triage Vitals [11/03/20 1404]   BP Temp Temp src Pulse Resp SpO2 Height Weight   -- 98.6 °F (37 °C) -- -- -- -- -- --       Physical Exam  Constitutional:       General: He is not in acute distress. Appearance: He is well-developed. HENT:      Head: Normocephalic and atraumatic. Right Ear: External ear normal.      Left Ear: External ear normal.   Eyes:      General: No scleral icterus. Right eye: No discharge. Left eye: No discharge. Conjunctiva/sclera: Conjunctivae normal.      Pupils: Pupils are equal, round, and reactive to light. Neck:      Musculoskeletal: Normal range of motion. Vascular: No JVD. Trachea: No tracheal deviation. Cardiovascular:      Rate and Rhythm: Regular rhythm. Heart sounds: Normal heart sounds. Pulmonary:      Effort: Pulmonary effort is normal. No respiratory distress. Breath sounds: Normal breath sounds. No stridor. No wheezing. Abdominal:      General: Bowel sounds are normal. There is no distension. Palpations: Abdomen is soft. Tenderness: There is no abdominal tenderness. There is no guarding or rebound. Musculoskeletal: Normal range of motion. General: Tenderness (Proximal tib-fib on the medial aspect, tenderness palpation of the lateral aspect of the left ankle,) present. No deformity. Comments: Notably no tenderness to palpation of the foot, plantar fascia   Skin:     General: Skin is warm. Coloration: Skin is not pale.    Neurological:      Mental Status: He is alert and oriented to person, place, and time. Cranial Nerves: No cranial nerve deficit. DIFFERENTIAL  DIAGNOSIS     PLAN (LABS / IMAGING / EKG):  Orders Placed This Encounter   Procedures    XR ANKLE LEFT (MIN 3 VIEWS)    XR TIBIA FIBULA LEFT (2 VIEWS)    [employer] Walker Boot, Air Select Low Top, Left; LG (T28-21/S05-82)    [employer] Crutches; Pair, Left Side Injury; Tall (5'10\"-6'6\")       MEDICATIONS ORDERED:  Orders Placed This Encounter   Medications    acetaminophen (TYLENOL) tablet 1,000 mg    ibuprofen (ADVIL;MOTRIN) tablet 800 mg    acetaminophen (TYLENOL) 325 MG tablet     Sig: Take 1 tablet by mouth every 6 hours as needed for Pain     Dispense:  60 tablet     Refill:  0    ibuprofen (ADVIL;MOTRIN) 800 MG tablet     Sig: Take 1 tablet by mouth every 8 hours as needed for Pain     Dispense:  30 tablet     Refill:  0       DDX: Stress fracture, high ankle sprain, tib-fib fracture    Initial MDM/Plan/ED COURSE:    48 y.o. male who presents with pain with ambulation, tenderness to the lateral malleolus of the left ankle as well as tenderness to palpation of the medial aspect of the tib-fib on the left lower extremity, plan to get an x-ray of the tib-fib, left lower ankle in order to assess for acute traumatic pathology, patient currently has knee brace, follows up with podiatry on outpatient basis for chronic leg pain, plan to treat patient Tylenol, ibuprofen, get x-ray to assess for pathology      Patient's x-ray does show 1/5 metatarsal fracture however patient has no tenderness palpation of the fifth metatarsal, patient does have tenderness with ambulation, plan to put patient in a walking boot, patient follow-up with podiatry as soon as possible, tib-fib negative for acute pathology, patient does have the ability to call podiatry in the morning.   Patient given work leave until able to meet with pathology, history of Tylenol, ibuprofen, knee brace, [id number]  [phone]    As needed    Rohini Voss MD  [address] [facility]  [address]  [address] [place] [address] [place] [address]  [phone]      As needed      DISCHARGE MEDICATIONS:  Discharge Medication List as of 11/3/2020  4:52 PM      START taking these medications    Details   acetaminophen (TYLENOL) 325 MG tablet Take 1 tablet by mouth every 6 hours as needed for Pain, Disp-60 tablet,R-0Print      ibuprofen (ADVIL;MOTRIN) 800 MG tablet Take 1 tablet by mouth every 8 hours as needed for Pain, Disp-30 tablet,R-0Print             West Closs, MD  Emergency Medicine Resident    (Please note that portions of this note were completed with a voice recognition program.Efforts were made to edit the dictations but occasionally words are mis-transcribed.)        West Closs, MD  Resident  11/03/20 2014

## 2020-11-03 NOTE — ED PROVIDER NOTES
[address] [facility]     Emergency Department     Faculty Attestation    I performed a history and physical examination of the patient and discussed management with the resident. I reviewed the residents note and agree with the documented findings and plan of care. Any areas of disagreement are noted on the chart. I was personally present for the key portions of any procedures. I have documented in the chart those procedures where I was not present during the key portions. I have reviewed the emergency nurses triage note. I agree with the chief complaint, past medical history, past surgical history, allergies, medications, social and family history as documented unless otherwise noted below. For Physician Assistant/ Nurse Practitioner cases/documentation I have personally evaluated this patient and have completed at least one if not all key elements of the E/M (history, physical exam, and MDM). Additional findings are as noted. I have personally seen and evaluated the patient. I find the patient's history and physical exam are consistent with the NP/PA documentation. I agree with the care provided, treatment rendered, disposition and follow-up plan. 73-year-old male with longstanding leg and knee pain presenting with new pain of the left ankle. Felt a popping while walking, no trauma that he is aware of. Uses a cane and knee brace regularly. Pain is mostly in the upper portion of his ankle and the top of his shin. No bruising or bleeding. Exam:  General: Laying on the bed, awake, alert and in no acute distress  MSK: Tender to palpation over the proximal ankle (above the lateral malleolus), and head of the fibula. No deformities. No tenderness over the foot or lower ankle to palpation. Full range of motion. Plan:  X-ray ankle and tib-fib. Ankle x-ray shows fracture at the base of the left fifth metatarsal.  I pressed on this area, patient has no pain in this region. However, given longstanding issues with his feet and ankles, will place in a short boot and have him follow-up with podiatry        Wilfredo Dorado MD   Attending Emergency  Physician    (Please note that portions of this note were completed with a voice recognition program. Efforts were made to edit the dictations but occasionally words are mis-transcribed.)              Wilfredo Dorado MD  11/03/20 [id number]

## 2020-11-04 ENCOUNTER — TELEPHONE (OUTPATIENT)
Dept: FAMILY MEDICINE CLINIC | Age: 55
End: 2020-11-04

## 2020-11-04 NOTE — RESULT ENCOUNTER NOTE
ABNORMAL. Please notify patient. Patient has a small fracture in the left foot, does he have appointment with podiatry?   I would like to make the referral now if he does not have a follow-up  Please let me know and I will refer to Dr. Denice Kulkarni, please give him contact info and I will place the referral afterwards  Future Appointments  11/13/2020 4:15 PM    MD yaima Robert

## 2020-11-04 NOTE — TELEPHONE ENCOUNTER
[facility] ([facility]) ED Follow up Call            FU appts/Provider:    Future Appointments   Date Time Provider [place]   11/13/2020  4:15 PM [facility]       VOICEMAIL DOCUMENTATION - ERASE IF NOT USED  Hi, this message is for  Keila Marcum   This is Clair Cranker from [address] [place] [address] office. Just calling to see how you are doing after your recent visit to the Emergency Room. [address] [place] [address] wants to make sure you were able to fill any prescriptions and that you understand your discharge instructions. Please return our call if you need to make a follow up appointment with your provider or have any further needs. Our phone number is [phone]. Have a great day.

## 2020-11-12 NOTE — PROGRESS NOTES
Visit Information    Have you changed or started any medications since your last visit including any over-the-counter medicines, vitamins, or herbal medicines? no   Have you stopped taking any of your medications? Is so, why? -  no  Are you having any side effects from any of your medications? - no    Have you seen any other physician or provider since your last visit?  no   Have you had any other diagnostic tests since your last visit? yes - XR TIBIA FIBULA LEFT, XR ANKLE LEFT   Have you been seen in the emergency room and/or had an admission in a hospital since we last saw you?  yes - 11/03/2020   Have you had your routine dental cleaning in the past 6 months? NO    Do you have an active MyChart account? If no, what is the barrier?   Yes    Patient Care Team:  Ingrid Mcdonough MD as PCP - General (Family Medicine)  Ingrid Mcdonough MD as PCP [facility]  Paige Braxton DPM as Consulting Physician (Podiatry)  Doreen Lewis MD as Consulting Physician (Orthopedic Surgery)    Medical History Review  Past Medical, Family, and Social History reviewed and does contribute to the patient presenting condition    Health Maintenance   Topic Date Due    DTaP/Tdap/Td vaccine (1 - Tdap) 10/17/1985    Shingles Vaccine (1 of 2) 10/17/2016    Colon Cancer Screen FIT/FOBT  05/12/2018    Potassium monitoring  02/26/2021    Creatinine monitoring  02/26/2021    Lipid screen  02/26/2025    Flu vaccine  Completed    Pneumococcal 0-64 years Vaccine  Completed    HIV screen  Completed    Hepatitis A vaccine  Aged Out    Hepatitis B vaccine  Aged Out    Hib vaccine  Aged Out    Meningococcal (ACWY) vaccine  Aged Out

## 2020-11-13 ENCOUNTER — OFFICE VISIT (OUTPATIENT)
Dept: FAMILY MEDICINE CLINIC | Age: 55
End: 2020-11-13
Payer: COMMERCIAL

## 2020-11-13 VITALS
WEIGHT: 203.4 LBS | HEIGHT: 71 IN | OXYGEN SATURATION: 95 % | SYSTOLIC BLOOD PRESSURE: 118 MMHG | HEART RATE: 74 BPM | BODY MASS INDEX: 28.48 KG/M2 | TEMPERATURE: 97.3 F | DIASTOLIC BLOOD PRESSURE: 78 MMHG

## 2020-11-13 PROCEDURE — 3017F COLORECTAL CA SCREEN DOC REV: CPT | Performed by: FAMILY MEDICINE

## 2020-11-13 PROCEDURE — G8926 SPIRO NO PERF OR DOC: HCPCS | Performed by: FAMILY MEDICINE

## 2020-11-13 PROCEDURE — G8419 CALC BMI OUT NRM PARAM NOF/U: HCPCS | Performed by: FAMILY MEDICINE

## 2020-11-13 PROCEDURE — 3023F SPIROM DOC REV: CPT | Performed by: FAMILY MEDICINE

## 2020-11-13 PROCEDURE — G8482 FLU IMMUNIZE ORDER/ADMIN: HCPCS | Performed by: FAMILY MEDICINE

## 2020-11-13 PROCEDURE — G8427 DOCREV CUR MEDS BY ELIG CLIN: HCPCS | Performed by: FAMILY MEDICINE

## 2020-11-13 PROCEDURE — 99214 OFFICE O/P EST MOD 30 MIN: CPT | Performed by: FAMILY MEDICINE

## 2020-11-13 PROCEDURE — 4004F PT TOBACCO SCREEN RCVD TLK: CPT | Performed by: FAMILY MEDICINE

## 2020-11-13 RX ORDER — AMLODIPINE BESYLATE 10 MG/1
TABLET ORAL
Qty: 90 TABLET | Refills: 2 | Status: SHIPPED | OUTPATIENT
Start: 2020-11-13 | End: 2021-03-24 | Stop reason: SDUPTHER

## 2020-11-13 RX ORDER — ALBUTEROL SULFATE 90 UG/1
AEROSOL, METERED RESPIRATORY (INHALATION)
Qty: 18 G | Refills: 2 | Status: SHIPPED | OUTPATIENT
Start: 2020-11-13 | End: 2021-03-24 | Stop reason: SDUPTHER

## 2020-11-13 RX ORDER — IPRATROPIUM BROMIDE AND ALBUTEROL SULFATE 2.5; .5 MG/3ML; MG/3ML
1 SOLUTION RESPIRATORY (INHALATION) EVERY 6 HOURS PRN
Qty: 360 ML | Refills: 3 | Status: SHIPPED | OUTPATIENT
Start: 2020-11-13 | End: 2021-03-24 | Stop reason: SDUPTHER

## 2020-11-13 RX ORDER — NAPROXEN 250 MG/1
250 TABLET ORAL 2 TIMES DAILY PRN
Qty: 180 TABLET | Refills: 0 | Status: SHIPPED | OUTPATIENT
Start: 2020-11-13 | End: 2021-03-24 | Stop reason: SDUPTHER

## 2020-11-13 RX ORDER — PANTOPRAZOLE SODIUM 40 MG/1
40 TABLET, DELAYED RELEASE ORAL DAILY
Qty: 90 TABLET | Refills: 3 | Status: SHIPPED | OUTPATIENT
Start: 2020-11-13 | End: 2021-03-24 | Stop reason: ALTCHOICE

## 2020-11-13 ASSESSMENT — ENCOUNTER SYMPTOMS
DIARRHEA: 0
NAUSEA: 0
ABDOMINAL DISTENTION: 0
CONSTIPATION: 0
WHEEZING: 0
CHEST TIGHTNESS: 0
VOMITING: 0
ABDOMINAL PAIN: 0
SHORTNESS OF BREATH: 1
COUGH: 0

## 2020-11-13 ASSESSMENT — PATIENT HEALTH QUESTIONNAIRE - PHQ9
SUM OF ALL RESPONSES TO PHQ QUESTIONS 1-9: 0
1. LITTLE INTEREST OR PLEASURE IN DOING THINGS: 0
SUM OF ALL RESPONSES TO PHQ QUESTIONS 1-9: 0
2. FEELING DOWN, DEPRESSED OR HOPELESS: 0
SUM OF ALL RESPONSES TO PHQ9 QUESTIONS 1 & 2: 0
SUM OF ALL RESPONSES TO PHQ QUESTIONS 1-9: 0

## 2020-11-13 NOTE — PROGRESS NOTES
Chief Complaint   Patient presents with    Hypertension    Chronic Pain    COPD    Other     ED VISIT LEFT FOOT FX 11/03/2020       Here for chronic and new medical problems. Hypertension: Patient here for follow-up of elevated blood pressure. He is not exercising and is adherent to low salt diet. Cardiac symptoms dyspnea and fatigue, mild, at baseline. Patient denies chest pain, chest pressure/discomfort, claudication, exertional chest pressure/discomfort, irregular heart beat, lower extremity edema, near-syncope, orthopnea, palpitations, paroxysmal nocturnal dyspnea, syncope and tachypneaCardiovascular risk factors: hypertension and smoking/ tobacco exposure. Use of agents associated with hypertension: NSAIDSHistory of target organ damage: none. BP controlled. Thanh Collado reports compliance with BP medications, and tolerates them well, denies side effects. BP Readings from Last 3 Encounters:   11/13/20 118/78   11/03/20 (!) 158/85   07/29/20 127/77          Pulse is Normal.    Pulse Readings from Last 3 Encounters:   11/13/20 74   11/03/20 65   07/29/20 85       Weight has been stable   Wt Readings from Last 3 Encounters:   11/13/20 203 lb 6.4 oz (92.3 kg)   11/03/20 200 lb (90.7 kg)   07/29/20 201 lb (91.2 kg)         LDL is normal  Lab Results   Component Value Date    LDLCHOLESTEROL 73 02/26/2020     Lab Results   Component Value Date    TRIG 43 02/26/2020    TRIG 31 08/28/2018     Was seen in ED on 11/3/2020, has fracture basal tip of 5th metatarsal  Left. Has LEFT FOOT PAIN, suffered High ankle sprain, has mild pain on top of the foot. Will see Dr. Anila Leahy, but doesn't have appointment   Will call for appointment with Dr. Anila Leahy  He says he walks a lot at work   Intensity of pain is 2/10. He says he has pain only after 6 hrs of walking.   Using quad cane which helps    COPD-Asthma:    Current treatment includes beta agonist inhalers, nebulized beta agonists, combination beta agonists/steroid inhalers, which has been effective. Using preventive medication(s) consistently: yes. Residual symptoms: dyspnea on exertion with intense activity. Patient denies chest pain/tightness, cough, wheezing. He requires his rescue inhaler 2 time(s) per week. Nicotine dependence. Smoker, counseling given to quit smoking. Ready to quit: No  Counseling given: Yes        GERD  Reports heartburn intermittent. Taking Protonix  Denies nausea, vomiting, diarrhea or constipation. Denies abdominal pain. Screening PSA  The natural history of prostate cancer and ongoing controversy regarding screening and potential treatment outcomes of prostate cancer has been discussed with the patient. The meaning of a false positive PSA and a false negative PSA has been discussed. He indicates understanding of the limitations of this screening test and wishes to proceed with screening PSA testing. Patient is due for colon cancer screening. Deo Singh denies blood in the stool. We discussed options, he would like to have: Cologuard. [x]Negative depression screening. PHQ Scores 11/13/2020 1/15/2020 8/29/2019 4/30/2019 8/8/2018   PHQ2 Score 0 0 0 0 0   PHQ9 Score 0 0 0 0 0           Current Outpatient Medications   Medication Sig Dispense Refill    acetaminophen (TYLENOL) 325 MG tablet Take 1 tablet by mouth every 6 hours as needed for Pain 60 tablet 0    ibuprofen (ADVIL;MOTRIN) 800 MG tablet Take 1 tablet by mouth every 8 hours as needed for Pain 30 tablet 0    aluminum chloride (DRYSOL) 20 % external solution Apply topically nightly.  60 mL 3    fluticasone-salmeterol (ADVAIR DISKUS) 500-50 MCG/DOSE diskus inhaler Inhale 1 puff into the lungs every 12 hours 60 each 5    amLODIPine (NORVASC) 10 MG tablet TAKE ONE TABLET BY MOUTH DAILY 90 tablet 2    albuterol sulfate HFA (VENTOLIN HFA) 108 (90 Base) MCG/ACT inhaler INHALE TWO PUFFS BY MOUTH EVERY 6 HOURS AS NEEDED FOR WHEEZING OR FOR SHORTNESS OF BREATH (COUGH) 18 g 2    erythromycin with ethanol (EMGEL) 2 % gel Apply topically daily. 1 Tube 2    pantoprazole (PROTONIX) 20 MG tablet Take 1 tablet by mouth 2 times daily (Patient not taking: Reported on 11/13/2020) 30 tablet 3    sucralfate (CARAFATE) 1 GM tablet Take 1 tablet by mouth 4 times daily 120 tablet 3    methylPREDNISolone (MEDROL DOSEPACK) 4 MG tablet Take by mouth. (Patient not taking: Reported on 11/13/2020) 1 kit 0    ipratropium-albuterol (DUONEB) 0.5-2.5 (3) MG/3ML SOLN nebulizer solution Take 3 mLs by nebulization every 6 hours as needed for Shortness of Breath or Other (dyspnea, wheezing) (Patient not taking: Reported on 7/29/2020) 360 mL 3    naproxen (NAPROSYN) 250 MG tablet Take 1 tablet by mouth 2 times daily as needed for Pain (Patient not taking: Reported on 11/13/2020) 180 tablet 1    Elastic Bandages & Supports (KNEE BRACE/HINGED BARS LARGE) MISC Needs knee brace bilaterally, hinged. 2 each 3     No current facility-administered medications for this visit. Social History     Tobacco Use    Smoking status: Current Every Day Smoker     Packs/day: 0.50     Years: 20.00     Pack years: 10.00     Types: Cigarettes    Smokeless tobacco: Never Used   Substance Use Topics    Alcohol use: Yes     Alcohol/week: 12.0 standard drinks     Types: 12 Cans of beer per week     Comment: 6 beers per day    Drug use: No       Ready to quit: No  Counseling given: Yes        The patient's past medical, surgical, social, and family history as well as his current medications and allergies were reviewed as documented in today's encounter. Restof complaints with corresponding details per ROS    Review of Systems   Constitutional: Positive for fatigue. Negative for activity change, appetite change, chills, diaphoresis, fever and unexpected weight change. Respiratory: Positive for shortness of breath. Negative for cough, chest tightness and wheezing.     Cardiovascular: Negative for chest pain, palpitations and leg swelling. Gastrointestinal: Negative for abdominal distention, abdominal pain, constipation, diarrhea, nausea and vomiting. Heartburn    Endocrine: Negative for cold intolerance, heat intolerance, polydipsia, polyphagia and polyuria. Musculoskeletal: Positive for arthralgias (left foot) and gait problem. Ambulates with quad cane   Allergic/Immunologic: Positive for environmental allergies. Psychiatric/Behavioral: Negative for dysphoric mood. -vital signs stable and within normal limits except Overweight per BMI and mildly low pulse ox. /78   Pulse 74   Temp 97.3 °F (36.3 °C)   Ht 5' 11\" (1.803 m)   Wt 203 lb 6.4 oz (92.3 kg)   SpO2 95%   BMI 28.37 kg/m²        Physical Exam  Vitals signs and nursing note reviewed. Constitutional:       General: He is not in acute distress. Appearance: Normal appearance. He is well-developed. He is not diaphoretic. HENT:      Head: Normocephalic and atraumatic. Right Ear: External ear normal.      Left Ear: External ear normal.      Nose: Nose normal.      Mouth/Throat:      Comments: I did not examine the mouth due to coronavirus pandemic and wearing masks    Eyes:      General: Lids are normal. No scleral icterus. Right eye: No discharge. Left eye: No discharge. Conjunctiva/sclera: Conjunctivae normal.   Neck:      Musculoskeletal: Normal range of motion and neck supple. Thyroid: No thyromegaly. Cardiovascular:      Rate and Rhythm: Normal rate and regular rhythm. Heart sounds: Normal heart sounds. No murmur. Pulmonary:      Effort: Pulmonary effort is normal. No respiratory distress. Breath sounds: Examination of the right-lower field reveals decreased breath sounds. Examination of the left-lower field reveals decreased breath sounds. Decreased breath sounds present. No wheezing or rales. Chest:      Chest wall: No tenderness.    Abdominal: General: Bowel sounds are normal. There is no distension. Palpations: Abdomen is soft. There is no hepatomegaly or splenomegaly. Tenderness: There is no abdominal tenderness. Musculoskeletal: Normal range of motion. Right lower leg: No edema. Left lower leg: No edema. Left foot: Tenderness present. Comments: Left foot pain on the dorsum the foot, very mild, there is no swelling and no bruising   Lymphadenopathy:      Cervical: No cervical adenopathy. Skin:     General: Skin is warm and dry. Capillary Refill: Capillary refill takes less than 2 seconds. Findings: No rash. Neurological:      Mental Status: He is alert and oriented to person, place, and time. Gait: Gait abnormal.      Deep Tendon Reflexes: Reflexes are normal and symmetric. Comments: Ambulates with quad cane   Psychiatric:         Mood and Affect: Mood normal.         Behavior: Behavior normal.         Thought Content: Thought content normal.         Judgment: Judgment normal.               ASSESSMENT AND PLAN      1. Essential hypertension  Well controlled. Continue current treatment. Will recheck labs. Discussed low salt diet and BP and pulse monitoring daily    - amLODIPine (NORVASC) 10 MG tablet; TAKE ONE TABLET BY MOUTH DAILY  Dispense: 90 tablet; Refill: 2  - CBC; Future  - Comprehensive Metabolic Panel; Future  - TSH without Reflex; Future  - Urinalysis Reflex to Culture; Future  - EKG 12 Lead; Future    2. Left foot pain  Improved  He promises me he will call Dr. Janett Vogel for appointment  [place] a lot with quad cane , he says he doesn't have concerns, and declines letter for work    3. COPD with asthma ([facility] [address]  stable  - fluticasone-salmeterol (ADVAIR DISKUS) 500-50 MCG/DOSE diskus inhaler; Inhale 1 puff into the lungs every 12 hours  Dispense: 60 each; Refill: 5  - ipratropium-albuterol (DUONEB) 0.5-2.5 (3) MG/3ML SOLN nebulizer solution;  Take 3 mLs by nebulization every 6 hours as needed for Shortness of Breath or Other (dyspnea, wheezing)  Dispense: 360 mL; Refill: 3  Albuterol prn    Nicotine dependence. Smoker, counseling given to quit smoking. Ready to quit: No  Counseling given: Yes        4. Gastroesophageal reflux disease without esophagitis  Improved with medication  - pantoprazole (PROTONIX) 40 MG tablet; Take 1 tablet by mouth daily  Dispense: 90 tablet; Refill: 3    5. Screening PSA (prostate specific antigen)  - Psa screening; Future  The natural history of prostate cancer and ongoing controversy regarding screening and potential treatment outcomes of prostate cancer has been discussed with the patient. The meaning of a false positive PSA and a false negative PSA has been discussed. He indicates understanding of the limitations of this screening test and wishes to proceed with screening PSA testing. 6. Screen for colon cancer  - Cologuard; Future    Data Unavailable      Orders Placed This Encounter   Procedures    Cologuard     This test is performed by an external laboratory and is used for result attachment only. It is required that this order requisition be faxed to: [email or web address] @@ [phone]. See [email or web address] for further information. Standing Status:   Future     Standing Expiration Date:   11/12/2021    Psa screening     Standing Status:   Future     Standing Expiration Date:   12/13/2020    CBC     Standing Status:   Future     Standing Expiration Date:   12/13/2020    Comprehensive Metabolic Panel     Standing Status:   Future     Standing Expiration Date:   12/13/2020    TSH without Reflex     Standing Status:   Future     Standing Expiration Date:   12/13/2020    Urinalysis Reflex to Culture     Standing Status:   Future     Standing Expiration Date:   12/13/2020     Order Specific Question:   SPECIFY(EX-CATH,MIDSTREAM,CYSTO,ETC)?      Answer:   midstream    EKG 12 Lead     Standing Status:   Future     Standing Expiration Date: 11/13/2021     Order Specific Question:   Reason for Exam?     Answer:   Hypertension     Order Specific Question:   Reason for Exam?     Answer:   Shortness of Breath         Medications Discontinued During This Encounter   Medication Reason    methylPREDNISolone (MEDROL DOSEPACK) 4 MG tablet Therapy completed    erythromycin with ethanol (EMGEL) 2 % gel Therapy completed    ibuprofen (ADVIL;MOTRIN) 800 MG tablet Alternate therapy    fluticasone-salmeterol (ADVAIR DISKUS) 500-50 MCG/DOSE diskus inhaler REORDER    amLODIPine (NORVASC) 10 MG tablet REORDER    albuterol sulfate HFA (VENTOLIN HFA) 108 (90 Base) MCG/ACT inhaler REORDER    naproxen (NAPROSYN) 250 MG tablet REORDER    pantoprazole (PROTONIX) 20 MG tablet REORDER    ipratropium-albuterol (DUONEB) 0.5-2.5 (3) MG/3ML SOLN nebulizer solution REORDER       Anastacio received counseling on the following healthy behaviors: nutrition, exercise, medication adherence, tobacco cessation and weight loss  Reviewed prior labs and health maintenance  Continue current medications, diet and exercise. Discussed use, benefit, and side effects of prescribed medications. Barriers to medication compliance addressed. Patient given educational materials - see patient instructions  Was a self-tracking handout given in paper form or via Cloud[employer]?  Yes    Requested Prescriptions     Signed Prescriptions Disp Refills    fluticasone-salmeterol (ADVAIR DISKUS) 500-50 MCG/DOSE diskus inhaler 60 each 5     Sig: Inhale 1 puff into the lungs every 12 hours    amLODIPine (NORVASC) 10 MG tablet 90 tablet 2     Sig: TAKE ONE TABLET BY MOUTH DAILY    albuterol sulfate HFA (VENTOLIN HFA) 108 (90 Base) MCG/ACT inhaler 18 g 2     Sig: INHALE TWO PUFFS BY MOUTH EVERY 6 HOURS AS NEEDED FOR WHEEZING OR FOR SHORTNESS OF BREATH (COUGH)    naproxen (NAPROSYN) 250 MG tablet 180 tablet 0     Sig: Take 1 tablet by mouth 2 times daily as needed for Pain ONLY AS NEEDED, CAN CAUSE GI BLEED, ULCERS AND KIDNEY FAILURE IF TAKING IT EVERY DAY    pantoprazole (PROTONIX) 40 MG tablet 90 tablet 3     Sig: Take 1 tablet by mouth daily    ipratropium-albuterol (DUONEB) 0.5-2.5 (3) MG/3ML SOLN nebulizer solution 360 mL 3     Sig: Take 3 mLs by nebulization every 6 hours as needed for Shortness of Breath or Other (dyspnea, wheezing)       All patient questions answered. Patient voiced understanding. Quality Measures    Body mass index is 28.37 kg/m². Elevated. Weight control planned discussed conventional weight loss and Healthy diet and regular exercise. BP: 118/78 Blood pressure is normal. Treatment plan consists of Weight Reduction, DASH Eating Plan, Dietary Sodium Restriction, Increased Physical Activity, Avoid Tobacco and Second-hand Smoke, Patient In-home Blood Pressure Monitoring and No treatment change needed. The patient's past medical, surgical, social, and family history as well as his   current medications and allergies were reviewed as documented in today's encounter. Medications, labs, diagnostic studies, consultations and follow-up as documented in this encounter. Return for Face-2F-30mins PHYSICAL, VISION screen, PHQ9., LABS F/U. Patient was seen with total face to face time of  25 minutes. More than 50% of this visit was counseling and education. Future Appointments   Date Time Provider [place]   3/24/2021  3:45 PM Lynda Lopez MD [email or web address]        This note was completed by using the assistance of a speech-recognition program. However, inadvertent computerized transcription errors may be present. Although every effort was made to ensure accuracy, no guarantees can be provided that every mistake has been identified and corrected by editing.     Electronically signed by Lynda Lopez MD on 11/15/2020 at 10:04 AM

## 2020-11-13 NOTE — PATIENT INSTRUCTIONS
are trying to quit smoking. · Consider signing up for a smoking cessation program, such as the American Lung Association's Freedom from Smoking program.  · Get text messaging support. Go to the website at www.smokefree. gov to sign up for the [facility] program.  · Set a quit date. Pick your date carefully so that it is not right in the middle of a big deadline or stressful time. Once you quit, do not even take a puff. Get rid of all ashtrays and lighters after your last cigarette. Clean your house and your clothes so that they do not smell of smoke. · Learn how to be a nonsmoker. Think about ways you can avoid those things that make you reach for a cigarette. ? Avoid situations that put you at greatest risk for smoking. For some people, it is hard to have a drink with friends without smoking. For others, they might skip a coffee break with coworkers who smoke. ? Change your daily routine. Take a different route to work or eat a meal in a different place. · Cut down on stress. Calm yourself or release tension by doing an activity you enjoy, such as reading a book, taking a hot bath, or gardening. · Talk to your doctor or pharmacist about nicotine replacement therapy, which replaces the nicotine in your body. You still get nicotine but you do not use tobacco. Nicotine replacement products help you slowly reduce the amount of nicotine you need. These products come in several forms, many of them available over-the-counter:  ? Nicotine patches  ? Nicotine gum and lozenges  ? Nicotine inhaler  · Ask your doctor about bupropion (Wellbutrin) or varenicline (Chantix), which are prescription medicines. They do not contain nicotine. They help you by reducing withdrawal symptoms, such as stress and anxiety. · Some people find hypnosis, acupuncture, and massage helpful for ending the smoking habit. · Eat a healthy diet and get regular exercise.  Having healthy habits will help your body move past its craving for nicotine. · Be prepared to keep trying. Most people are not successful the first few times they try to quit. Do not get mad at yourself if you smoke again. Make a list of things you learned and think about when you want to try again, such as next week, next month, or next year. Where can you learn more? Go to https://chpepiceweb.[email or web address] org and sign in to your GitHub account. Enter H601 in the [employer] box to learn more about \"Stopping Smoking: Care Instructions. \"     If you do not have an account, please click on the \"Sign Up Now\" link. Current as of: March 12, 2020               Content Version: 12.6  © 2006-2020 [employer]. Care instructions adapted under license by [facility] ([facility]). If you have questions about a medical condition or this instruction, always ask your healthcare professional. [place] [address] any warranty or liability for your use of this information. Patient Education        Low Sodium Diet (2,000 Milligram): Care Instructions  Your Care Instructions     Too much sodium causes your body to hold on to extra water. This can raise your blood pressure and force your heart and kidneys to work harder. In very serious cases, this could cause you to be put in the hospital. It might even be life-threatening. By limiting sodium, you will feel better and lower your risk of serious problems. The most common source of sodium is salt. People get most of the salt in their diet from canned, prepared, and packaged foods. Fast food and restaurant meals also are very high in sodium. Your doctor will probably limit your sodium to less than 2,000 milligrams (mg) a day. This limit counts all the sodium in prepared and packaged foods and any salt you add to your food. Follow-up care is a key part of your treatment and safety. Be sure to make and go to all appointments, and call your doctor if you are having problems.  It's also a good idea to know your test results and keep a list of the medicines you take. How can you care for yourself at home? Read food labels  · Read labels on cans and food packages. The labels tell you how much sodium is in each serving. Make sure that you look at the serving size. If you eat more than the serving size, you have eaten more sodium. · Food labels also tell you the Percent Daily Value for sodium. Choose products with low Percent Daily Values for sodium. · Be aware that sodium can come in forms other than salt, including monosodium glutamate (MSG), sodium citrate, and sodium bicarbonate (baking soda). MSG is often added to Asian food. When you eat out, you can sometimes ask for food without MSG or added salt. Buy low-sodium foods  · Buy foods that are labeled \"unsalted\" (no salt added), \"sodium-free\" (less than 5 mg of sodium per serving), or \"low-sodium\" (less than 140 mg of sodium per serving). Foods labeled \"reduced-sodium\" and \"light sodium\" may still have too much sodium. Be sure to read the label to see how much sodium you are getting. · Buy fresh vegetables, or frozen vegetables without added sauces. Buy low-sodium versions of canned vegetables, soups, and other canned goods. Prepare low-sodium meals  · Cut back on the amount of salt you use in cooking. This will help you adjust to the taste. Do not add salt after cooking. One teaspoon of salt has about 2,300 mg of sodium. · Take the salt shaker off the table. · Flavor your food with garlic, lemon juice, onion, vinegar, herbs, and spices. Do not use soy sauce, lite soy sauce, steak sauce, onion salt, garlic salt, celery salt, mustard, or ketchup on your food. · Use low-sodium salad dressings, sauces, and ketchup. Or make your own salad dressings and sauces without adding salt. · Use less salt (or none) when recipes call for it. You can often use half the salt a recipe calls for without losing flavor.  Other foods such as rice, pasta, and grains do not need added salt. · Rinse canned vegetables, and cook them in fresh water. This removes some--but not all--of the salt. · Avoid water that is naturally high in sodium or that has been treated with water softeners, which add sodium. Call your local water company to find out the sodium content of your water supply. If you buy bottled water, read the label and choose a sodium-free brand. Avoid high-sodium foods  · Avoid eating:  ? Smoked, cured, salted, and canned meat, fish, and poultry. ? Ham, christie, hot dogs, and luncheon meats. ? Regular, hard, and processed cheese and regular peanut butter. ? Crackers with salted tops, and other salted snack foods such as pretzels, chips, and salted popcorn. ? Frozen prepared meals, unless labeled low-sodium. ? Canned and dried soups, broths, and bouillon, unless labeled sodium-free or low-sodium. ? Canned vegetables, unless labeled sodium-free or low-sodium. ? [place] fries, pizza, tacos, and other fast foods. ? Pickles, olives, ketchup, and other condiments, especially soy sauce, unless labeled sodium-free or low-sodium. Where can you learn more? Go to https://CityOdds.Sightlogix. org and sign in to your T[employer]ored Fit account. Enter T212 in the [facility] box to learn more about \"Low Sodium Diet (2,000 Milligram): Care Instructions. \"     If you do not have an account, please click on the \"Sign Up Now\" link. Current as of: August 22, 2019               Content Version: 12.6  © 9293-6462 [employer]. Care instructions adapted under license by [facility] ([facility]). If you have questions about a medical condition or this instruction, always ask your healthcare professional. [address] any warranty or liability for your use of this information.

## 2020-11-15 PROBLEM — J20.9 BRONCHITIS, ACUTE, WITH BRONCHOSPASM: Status: RESOLVED | Noted: 2020-07-29 | Resolved: 2020-11-15

## 2020-11-15 PROBLEM — J44.1 COPD WITH ACUTE EXACERBATION (HCC): Status: RESOLVED | Noted: 2020-01-15 | Resolved: 2020-11-15

## 2020-11-15 PROBLEM — M79.672 LEFT FOOT PAIN: Status: ACTIVE | Noted: 2020-11-15

## 2021-01-20 ENCOUNTER — HOSPITAL ENCOUNTER (OUTPATIENT)
Age: 56
Setting detail: SPECIMEN
Discharge: HOME | End: 2021-01-20
Payer: COMMERCIAL

## 2021-01-20 DIAGNOSIS — Z12.5 SCREENING PSA (PROSTATE SPECIFIC ANTIGEN): ICD-10-CM

## 2021-01-20 DIAGNOSIS — I10 ESSENTIAL HYPERTENSION: ICD-10-CM

## 2021-01-20 LAB
ALBUMIN SERPL-MCNC: 4.3 G/DL (ref 3.5–5.2)
ALBUMIN/GLOBULIN RATIO: 1.5 (ref 1–2.5)
ALP BLD-CCNC: 89 U/L (ref 40–129)
ALT SERPL-CCNC: 20 U/L (ref 5–41)
ANION GAP SERPL CALCULATED.3IONS-SCNC: 13 MMOL/L (ref 9–17)
AST SERPL-CCNC: 23 U/L
BILIRUB SERPL-MCNC: 0.38 MG/DL (ref 0.3–1.2)
BILIRUBIN URINE: NEGATIVE
BUN BLDV-MCNC: 11 MG/DL (ref 6–20)
BUN/CREAT BLD: ABNORMAL (ref 9–20)
CALCIUM SERPL-MCNC: 9.4 MG/DL (ref 8.6–10.4)
CHLORIDE BLD-SCNC: 102 MMOL/L (ref 98–107)
CO2: 23 MMOL/L (ref 20–31)
COLOR: YELLOW
COMMENT UA: NORMAL
CREAT SERPL-MCNC: 0.87 MG/DL (ref 0.7–1.2)
GFR AFRICAN AMERICAN: >60 ML/MIN
GFR NON-AFRICAN AMERICAN: >60 ML/MIN
GFR SERPL CREATININE-BSD FRML MDRD: ABNORMAL ML/MIN/{1.73_M2}
GFR SERPL CREATININE-BSD FRML MDRD: ABNORMAL ML/MIN/{1.73_M2}
GLUCOSE BLD-MCNC: 133 MG/DL (ref 70–99)
GLUCOSE URINE: NEGATIVE
HCT VFR BLD CALC: 44.7 % (ref 40.7–50.3)
HEMOGLOBIN: 14.2 G/DL (ref 13–17)
KETONES, URINE: NEGATIVE
LEUKOCYTE ESTERASE, URINE: NEGATIVE
MCH RBC QN AUTO: 29.6 PG (ref 25.2–33.5)
MCHC RBC AUTO-ENTMCNC: 31.8 G/DL (ref 28.4–34.8)
MCV RBC AUTO: 93.3 FL (ref 82.6–102.9)
NITRITE, URINE: NEGATIVE
NRBC AUTOMATED: 0 PER 100 WBC
PDW BLD-RTO: 13.4 % (ref 11.8–14.4)
PH UA: 7 (ref 5–8)
PLATELET # BLD: 266 K/UL (ref 138–453)
PMV BLD AUTO: 9.9 FL (ref 8.1–13.5)
POTASSIUM SERPL-SCNC: 4.7 MMOL/L (ref 3.7–5.3)
PROSTATE SPECIFIC ANTIGEN: 0.42 UG/L
PROTEIN UA: NEGATIVE
RBC # BLD: 4.79 M/UL (ref 4.21–5.77)
SODIUM BLD-SCNC: 138 MMOL/L (ref 135–144)
SPECIFIC GRAVITY UA: 1.01 (ref 1–1.03)
TOTAL PROTEIN: 7.2 G/DL (ref 6.4–8.3)
TSH SERPL DL<=0.05 MIU/L-ACNC: 1.68 MIU/L (ref 0.3–5)
TURBIDITY: CLEAR
URINE HGB: NEGATIVE
UROBILINOGEN, URINE: NORMAL
WBC # BLD: 5 K/UL (ref 3.5–11.3)

## 2021-01-20 NOTE — LETTER
[address] [place] [address]. [address], [address]   Phone: [phone]  Fax: [phone]    MD Steff Owens MD Orpha Floras, MD Rupert Bride, [address] [place] [address]    We were unable to reach you by phone. Please call our office at your earliest convenience. This message is regarding: results. Please call between the hours of 8:30am and 4:00pm Monday through Friday if possible. If you have any questions or concerns, please don't hesitate to call. Sincerely,      Lety [address] at [facility].

## 2021-01-21 NOTE — RESULT ENCOUNTER NOTE
ABNORMAL. Please notify patient. Blood glucose is high 133, low-carb diet is advised  Otherwise labs within normal limits  Did he receive the cologuard?   Needs to mail it back asa      Future Appointments  3/24/2021  3:45 PM    MD yaima Huitron [facility]

## 2021-03-24 ENCOUNTER — TELEPHONE (OUTPATIENT)
Dept: FAMILY MEDICINE CLINIC | Age: 56
End: 2021-03-24

## 2021-03-24 ENCOUNTER — OFFICE VISIT (OUTPATIENT)
Dept: FAMILY MEDICINE CLINIC | Age: 56
End: 2021-03-24
Payer: COMMERCIAL

## 2021-03-24 VITALS
SYSTOLIC BLOOD PRESSURE: 138 MMHG | WEIGHT: 200 LBS | DIASTOLIC BLOOD PRESSURE: 88 MMHG | HEIGHT: 71 IN | OXYGEN SATURATION: 97 % | HEART RATE: 87 BPM | BODY MASS INDEX: 28 KG/M2 | TEMPERATURE: 99.7 F

## 2021-03-24 DIAGNOSIS — R73.9 HYPERGLYCEMIA: ICD-10-CM

## 2021-03-24 DIAGNOSIS — G89.29 CHRONIC PAIN OF BOTH KNEES: ICD-10-CM

## 2021-03-24 DIAGNOSIS — M25.561 CHRONIC PAIN OF BOTH KNEES: ICD-10-CM

## 2021-03-24 DIAGNOSIS — M17.0 PRIMARY OSTEOARTHRITIS OF BOTH KNEES: ICD-10-CM

## 2021-03-24 DIAGNOSIS — L74.513 HYPERHIDROSIS OF SOLES: ICD-10-CM

## 2021-03-24 DIAGNOSIS — M25.562 CHRONIC PAIN OF BOTH KNEES: ICD-10-CM

## 2021-03-24 DIAGNOSIS — Z00.00 ANNUAL PHYSICAL EXAM: Primary | ICD-10-CM

## 2021-03-24 DIAGNOSIS — I10 ESSENTIAL HYPERTENSION: ICD-10-CM

## 2021-03-24 DIAGNOSIS — K21.9 GASTROESOPHAGEAL REFLUX DISEASE WITHOUT ESOPHAGITIS: ICD-10-CM

## 2021-03-24 DIAGNOSIS — J44.9 COPD WITH ASTHMA (HCC): ICD-10-CM

## 2021-03-24 PROBLEM — M25.569 PAIN AND SWELLING OF KNEE: Status: RESOLVED | Noted: 2018-08-30 | Resolved: 2021-03-24

## 2021-03-24 PROBLEM — M79.672 LEFT FOOT PAIN: Status: RESOLVED | Noted: 2020-11-15 | Resolved: 2021-03-24

## 2021-03-24 PROBLEM — M25.469 PAIN AND SWELLING OF KNEE: Status: RESOLVED | Noted: 2018-08-30 | Resolved: 2021-03-24

## 2021-03-24 LAB — HBA1C MFR BLD: 4.8 %

## 2021-03-24 PROCEDURE — 83036 HEMOGLOBIN GLYCOSYLATED A1C: CPT | Performed by: FAMILY MEDICINE

## 2021-03-24 PROCEDURE — 99396 PREV VISIT EST AGE 40-64: CPT | Performed by: FAMILY MEDICINE

## 2021-03-24 PROCEDURE — G8482 FLU IMMUNIZE ORDER/ADMIN: HCPCS | Performed by: FAMILY MEDICINE

## 2021-03-24 RX ORDER — SUCRALFATE 1 G/1
1 TABLET ORAL 4 TIMES DAILY
Qty: 120 TABLET | Refills: 3 | Status: CANCELLED | OUTPATIENT
Start: 2021-03-24

## 2021-03-24 RX ORDER — AMLODIPINE BESYLATE 10 MG/1
TABLET ORAL
Qty: 90 TABLET | Refills: 2 | Status: SHIPPED | OUTPATIENT
Start: 2021-03-24 | End: 2022-05-05

## 2021-03-24 RX ORDER — PANTOPRAZOLE SODIUM 40 MG/1
40 TABLET, DELAYED RELEASE ORAL DAILY
Qty: 90 TABLET | Refills: 3 | Status: CANCELLED | OUTPATIENT
Start: 2021-03-24

## 2021-03-24 RX ORDER — ALBUTEROL SULFATE 90 UG/1
AEROSOL, METERED RESPIRATORY (INHALATION)
Qty: 18 G | Refills: 2 | Status: SHIPPED | OUTPATIENT
Start: 2021-03-24 | End: 2021-08-16

## 2021-03-24 RX ORDER — ACETAMINOPHEN 325 MG/1
325 TABLET ORAL EVERY 6 HOURS PRN
Qty: 60 TABLET | Refills: 0 | Status: CANCELLED | OUTPATIENT
Start: 2021-03-24

## 2021-03-24 RX ORDER — NAPROXEN 250 MG/1
250 TABLET ORAL 2 TIMES DAILY PRN
Qty: 180 TABLET | Refills: 0 | Status: SHIPPED | OUTPATIENT
Start: 2021-03-24 | End: 2021-08-18 | Stop reason: SDUPTHER

## 2021-03-24 RX ORDER — IPRATROPIUM BROMIDE AND ALBUTEROL SULFATE 2.5; .5 MG/3ML; MG/3ML
1 SOLUTION RESPIRATORY (INHALATION) EVERY 6 HOURS PRN
Qty: 360 ML | Refills: 3 | Status: SHIPPED | OUTPATIENT
Start: 2021-03-24 | End: 2022-01-07 | Stop reason: SDUPTHER

## 2021-03-24 RX ORDER — ACETAMINOPHEN 500 MG
500 TABLET ORAL EVERY 6 HOURS PRN
Qty: 120 TABLET | Refills: 3 | Status: SHIPPED | OUTPATIENT
Start: 2021-03-24

## 2021-03-24 RX ORDER — PANTOPRAZOLE SODIUM 40 MG/1
40 TABLET, DELAYED RELEASE ORAL DAILY
Qty: 90 TABLET | Refills: 3 | Status: SHIPPED
Start: 2021-03-24 | End: 2021-08-18

## 2021-03-24 ASSESSMENT — ENCOUNTER SYMPTOMS
ABDOMINAL DISTENTION: 0
DIARRHEA: 0
ABDOMINAL PAIN: 0
COUGH: 0
NAUSEA: 0
BACK PAIN: 0
CONSTIPATION: 0
CHEST TIGHTNESS: 0
SHORTNESS OF BREATH: 1
VOMITING: 0
WHEEZING: 0

## 2021-03-24 ASSESSMENT — PATIENT HEALTH QUESTIONNAIRE - PHQ9
SUM OF ALL RESPONSES TO PHQ QUESTIONS 1-9: 0
2. FEELING DOWN, DEPRESSED OR HOPELESS: 0

## 2021-03-24 NOTE — TELEPHONE ENCOUNTER
Note completed, please fax order for KNEE BRACES . Please note where is being faxed. Then, please inform the patient where we faxed it, give patient phone number and address of the medical equipment pharmacy to check on that in about 1 week. Please document that the patient verbalized understanding and wrote down the instructions.

## 2021-03-24 NOTE — PROGRESS NOTES
Visit Information    Have you changed or started any medications since your last visit including any over-the-counter medicines, vitamins, or herbal medicines? no   Are you having any side effects from any of your medications? -  no  Have you stopped taking any of your medications? Is so, why? -  no    Have you seen any other physician or provider since your last visit? No  Have you had any other diagnostic tests since your last visit? Yes - Records Obtained  Have you been seen in the emergency room and/or had an admission to a hospital since we last saw you? No  Have you had your routine dental cleaning in the past 6 months? yes -     Have you activated your [employer] account? If not, what are your barriers?  No:      Patient Care Team:  Myron Parish MD as PCP - General (Family Medicine)  Myron Parish MD as PCP - [facility]  Lamont Sanchez DPM as Consulting Physician (Podiatry)  Dayanna Bowens MD as Consulting Physician (Orthopedic Surgery)    Medical History Review  Past Medical, Family, and Social History reviewed and does contribute to the patient presenting condition    Health Maintenance   Topic Date Due    COVID-19 Vaccine (1) Never done    Diabetes screen  Never done    Shingles Vaccine (1 of 2) Never done    Colon Cancer Screen FIT/FOBT  05/12/2018    DTaP/Tdap/Td vaccine (1 - Tdap) 11/13/2021 (Originally 10/17/1984)    Potassium monitoring  01/20/2022    Creatinine monitoring  01/20/2022    Lipid screen  02/26/2025    Flu vaccine  Completed    Pneumococcal 0-64 years Vaccine  Completed    Hepatitis C screen  Completed    HIV screen  Completed    Hepatitis A vaccine  Aged Out    Hepatitis B vaccine  Aged Out    Hib vaccine  Aged Out    Meningococcal (ACWY) vaccine  Aged Out

## 2021-03-24 NOTE — PROGRESS NOTES
3/24/2021      Anjum Crane (:  1965) is a 48 y.o. male, here for a preventive medicine evaluation, Annual Exam, Hypertension, Orders (braces ), Knee Pain, and Asthma   . ASSESSMENT/PLAN:    1. Annual physical exam  Has Cologuard at home, told to have it shipped, The patient verbalizes understanding and agrees with the plan. Low carb, low fat diet, increase fruits and vegetables, and exercise 4-5 times a week 30-40 minutes a day, or walk 1-2 hours per day, or wear a pedometer and get at least 10,000 steps per day. Dental exam 2-3 times /year advised. Immunizations reviewed. Health Maintenance reviewed   Smoking cessation counseling given     Annual eye exam advised. 2. Essential hypertension  Well controlled. Continue current treatment. Discussed low salt diet and BP and pulse monitoring daily    -     amLODIPine (NORVASC) 10 MG tablet; TAKE ONE TABLET BY MOUTH DAILY, Disp-90 tablet, R-2Normal  3. Primary osteoarthritis of both knees pe Xrays  Likely worsening due to wear and tear nature of the disease.   -     naproxen (NAPROSYN) 250 MG tablet; Take 1 tablet by mouth 2 times daily as needed for Pain ONLY AS NEEDED, CAN CAUSE GI BLEED, ULCERS AND KIDNEY FAILURE IF TAKING IT EVERY DAY, Disp-180 tablet, R-0Normal  -     acetaminophen (TYLENOL) 500 MG tablet; Take 1 tablet by mouth every 6 hours as needed for Pain, Disp-120 tablet, R-3Normal  -     Elastic Bandages & Supports (KNEE BRACE/HINGED BARS LARGE) MISC; Disp-2 each, R-3, Print Needs knee brace bilaterally, hinged---would benefit from using the brace  Declines referral to orthopedics due to work, will check with them how his benefits are to cover for him in case of surgery       -  start   Glucosamine-Chondroitin-MSM (TRIPLE FLEX) 750-400-375 MG TABS; Take 2 tablets by mouth daily With food. NATURE MADE, Disp-180 tablet, R-3NO PRINT  4. COPD with asthma ([facility] [address]  Stable  Smoking cessation counseling given.   Continue current treatment. -     albuterol sulfate HFA (VENTOLIN HFA) 108 (90 Base) MCG/ACT inhaler; INHALE TWO PUFFS BY MOUTH EVERY 6 HOURS AS NEEDED FOR WHEEZING OR FOR SHORTNESS OF BREATH (COUGH), Disp-18 g, R-2Normal  -     fluticasone-salmeterol (ADVAIR DISKUS) 500-50 MCG/DOSE diskus inhaler; Inhale 1 puff into the lungs every 12 hours, Disp-60 each, R-5Normal  -     ipratropium-albuterol (DUONEB) 0.5-2.5 (3) MG/3ML SOLN nebulizer solution; Take 3 mLs by nebulization every 6 hours as needed for Shortness of Breath or Other (dyspnea, wheezing), Disp-360 mL, R-3Normal  5. Gastroesophageal reflux disease without esophagitis  Improved with meds  Declines EGD  -     pantoprazole (PROTONIX) 40 MG tablet; Take 1 tablet by mouth daily, Disp-90 tablet, R-3Normal  6. Hyperglycemia  -     POCT glycosylated hemoglobin (Hb A1C) 4.8  Lab Results   Component Value Date    LABA1C 4.8 03/24/2021       7. Hyperhidrosis of soles  -improved with meds     aluminum chloride (DRYSOL) 20 % external solution; Apply topically nightly., Disp-60 mL, R-3, Normal  8. Chronic pain of both knees  improved with meds  Declines ortho referral at this time    -     naproxen (NAPROSYN) 250 MG tablet; Take 1 tablet by mouth 2 times daily as needed for Pain ONLY AS NEEDED, CAN CAUSE GI BLEED, ULCERS AND KIDNEY FAILURE IF TAKING IT EVERY DAY, Disp-180 tablet, R-0Normal  -     acetaminophen (TYLENOL) 500 MG tablet; Take 1 tablet by mouth every 6 hours as needed for Pain, Disp-120 tablet, R-3Normal  -     Elastic Bandages & Supports (KNEE BRACE/HINGED BARS LARGE) MISC; Disp-2 each, R-3, PrintNeeds knee brace bilaterally, hinged.   Will fax to [place] received counseling on the following healthy behaviors: nutrition, exercise, medication adherence, tobacco cessation and decrease in alcohol consumption, weight loss    Reviewed prior labs and health maintenance  Discussed use, benefit, and side effects of prescribed medications. Barriers to medication compliance addressed. Patient given educational materials - see patient instructions  All patient questions answered. Patient voiced understanding. The patient's past medical, surgical, social, and family history as well as his  current medications and allergies were reviewed as documented in today's encounter. Medications, labs, diagnostic studies, consultations and follow-up as documented in thisencounter. Return in about 3 months (around 6/24/2021) for ASTHMA, COPD, LABS F/U, HTN. Data Unavailable        Patient Active Problem List   Diagnosis    COPD with asthma ([facility] [address]    Gastroesophageal reflux disease without esophagitis    Allergic rhinitis    Pansinusitis    Essential hypertension    Penicillin allergy    Vitamin B 12 deficiency    Chronic pain of both knees    Primary osteoarthritis of both knees pe Xrays    Varicose veins of bilateral lower extremities with other complications    Raynaud's disease without gangrene    Right leg numbness    Current smoker    Hyperhidrosis of soles       Prior to Visit Medications    Medication Sig Taking? Authorizing Provider   albuterol sulfate HFA (VENTOLIN HFA) 108 (90 Base) MCG/ACT inhaler INHALE TWO PUFFS BY MOUTH EVERY 6 HOURS AS NEEDED FOR WHEEZING OR FOR SHORTNESS OF BREATH (COUGH) Yes Allison Sullivan MD   aluminum chloride (DRYSOL) 20 % external solution Apply topically nightly.  Yes Allison Sullivan MD   amLODIPine (NORVASC) 10 MG tablet TAKE ONE TABLET BY MOUTH DAILY Yes Allison Sullivan MD   fluticasone-salmeterol (ADVAIR DISKUS) 500-50 MCG/DOSE diskus inhaler Inhale 1 puff into the lungs every 12 hours Yes Allison Sullivan MD   ipratropium-albuterol (DUONEB) 0.5-2.5 (3) MG/3ML SOLN nebulizer solution Take 3 mLs by nebulization every 6 hours as needed for Shortness of Breath or Other (dyspnea, wheezing) Yes Allison Sullivan MD   naproxen (NAPROSYN) 250 MG tablet Take 1 tablet by mouth 2 times daily as needed for Pain ONLY AS NEEDED, CAN CAUSE GI BLEED, ULCERS AND KIDNEY FAILURE IF TAKING IT EVERY DAY Yes Paola Wade MD   pantoprazole (PROTONIX) 40 MG tablet Take 1 tablet by mouth daily Yes Paola Wade MD   acetaminophen (TYLENOL) 500 MG tablet Take 1 tablet by mouth every 6 hours as needed for Pain Yes Paola Wade MD   Elastic Bandages & Supports (KNEE BRACE/HINGED BARS LARGE) MISC Needs knee brace bilaterally, hinged. Yes Paola Wade MD        Allergies   Allergen Reactions    Aspirin Shortness Of Breath    Pcn [Penicillins] Shortness Of Breath     Had Rocephin    Bactrim [Sulfamethoxazole-Trimethoprim] Itching    Doxycycline Hives and Itching     Pt stopped Dozy after 2 days because of reaction. Past Medical History:   Diagnosis Date    Alcohol abuse     Allergic rhinitis     Asthma     COPD (chronic obstructive pulmonary disease) (HCC)     GERD (gastroesophageal reflux disease)     Hyperlipidemia with target LDL less than 100 8/29/2019    Hypertension     Pansinusitis     Primary osteoarthritis of both knees pe Xrays 8/30/2018       Past Surgical History:   Procedure Laterality Date    ANKLE FRACTURE SURGERY Right     FACIAL COSMETIC SURGERY  1978    bit by a dog in the right cheek   . Social History     Tobacco Use    Smoking status: Current Every Day Smoker     Packs/day: 0.50     Years: 20.00     Pack years: 10.00     Types: Cigarettes    Smokeless tobacco: Never Used   Substance Use Topics    Alcohol use:  Yes     Alcohol/week: 12.0 standard drinks     Types: 12 Cans of beer per week     Comment: 6 beers per day    Drug use: No       Family History   Problem Relation Age of Onset    Allergies Daughter     Depression Daughter     Colon Cancer Neg Hx     Prostate Cancer Neg Hx        ADVANCE DIRECTIVE: N, <no information>    SEAN / Laverne Ruperto is here for Annual Exam, Hypertension, Orders (braces ), Knee Pain, and Asthma       Current concerns: knee pain bilateral, worse in the left knee, has difficulty walking   Needs new braces as now they are lose and not helping anymore  Refuses orthopedics, he doesn't know if he can afford to miss work if surgery is needed    Urinary symptoms: no    Sexually active: Yes     Wears seatbelts: yes     Regular exercise: yes, walking  Ever been transfused or tattooed?: yes    Last eye exam: up to date: Yes  Abnormal visual screening. Mala Lyle  reports he is up-to-date with his eye exam.     Hearing Screening    125Hz 250Hz 500Hz 1000Hz 2000Hz 3000Hz 4000Hz 6000Hz 8000Hz   Right ear:            Left ear:               Visual Acuity Screening    Right eye Left eye Both eyes   Without correction: 20/30 20/40 20/25   With correction:          Hearing:normal :Yes    Last dental exam and preventative dental cleaning: up to date : Yes    Nutritional assessment: Body mass index is 27.89 kg/m². Elevated, Overweight per BMI. .     Weight is decreasing   Wt Readings from Last 3 Encounters:   03/24/21 200 lb (90.7 kg)   11/13/20 203 lb 6.4 oz (92.3 kg)   11/03/20 200 lb (90.7 kg)       Healthful diet and Physical activity counseling to prevent CVD- low carb, low fat diet, increase fruits and vegetables, and exercise 4-5 times a day 30-40minutes a day discussed      COPD-Asthma:  Current treatment includes short-acting beta agonist inhaler, nebulizer Duonebs, combined beta agonist/steroid inhaler, which has been effective. Residual symptoms: chronic dyspnea  At baseline. He denies cough, purulent sputum, wheezing, chest tightness, chest pain. He requires his rescue inhaler 1 time(s) per week. Nicotine dependence. yes    Smoker, counseling given to quit smoking.     Ready to quit: No  Counseling given: Yes      Alcohol use: yes , in the evenings, advised to cut down on drinking     Immunization History   Administered Date(s) Administered    Influenza 10/04/2013    Influenza Virus Vaccine 09/24/2012, 10/04/2013, 02/18/2015    Influenza, Reymundo Mohan, Recombinant, IM PF (Flublok 18 yrs and older) 10/14/2020    Pneumococcal Conjugate 13-valent ([id number]) 02/18/2015    Pneumococcal Polysaccharide ([id number]) 05/12/2017       Tdap one time, then Td every 10 years-up to date:  No: declines     Influenza annually-up to date:  Yes    PPSV 23 in all adults 19-63 yo with chronic conditions,smokers, alcoholism,  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 The patient has NO family history of colon cancer    GERD improved  EGD declines, better since on Pantoprazole  Denies nausea, vomiting, diarrhea, constipation or abdominal pain    The patient has NO family history of cancer. PSA within normal limits     Lab Results   Component Value Date    PSA 0.42 01/20/2021     HTN  BP controlled. Calderon Pinto reports compliance with BP medications, and tolerates them well, denies side effects. BP Readings from Last 3 Encounters:   03/24/21 138/88   11/13/20 118/78   11/03/20 (!) 158/85       Pulse is normal.  Pulse Readings from Last 3 Encounters:   03/24/21 87   11/13/20 74   11/03/20 65     Blood Glucose 133 on 1/20/2021  A1c is within normal limits   Denies increased appetite, thirst or polyuria.   He doesn't eat sweets , drinks beer in the evening  Lab Results   Component Value Date    LABA1C 4.8 03/24/2021       Lab Results   Component Value Date    LABA1C 4.8 03/24/2021       Lipid screening -lipids within normal limits     Lab Results   Component Value Date    CHOL 194 02/26/2020    CHOL 162 08/28/2018     Lab Results   Component Value Date    TRIG 43 02/26/2020    TRIG 31 08/28/2018     Lab Results   Component Value Date     02/26/2020     08/28/2018     Lab Results   Component Value Date    LDLCHOLESTEROL 73 02/26/2020    LDLCHOLESTEROL 54 08/28/2018     Lab Results Component Value Date    CHOLHDLRATIO 1.7 02/26/2020    CHOLHDLRATIO 1.6 08/28/2018       Hepatitis C screening- nonreactive  Lab Results   Component Value Date    HEPAIGM NONREACTIVE 12/13/2011    HEPBIGM NONREACTIVE 12/13/2011    HEPCAB NONREACTIVE 12/13/2011            [x]Negative depression screening. PHQ Scores 3/24/2021 11/13/2020 1/15/2020 8/29/2019 4/30/2019 8/8/2018   PHQ2 Score 0 0 0 0 0 0   PHQ9 Score 0 0 0 0 0 0       Health Maintenance   Topic Date Due    COVID-19 Vaccine (1) Never done    DTaP/Tdap/Td vaccine (1 - Tdap) Never done    Shingles Vaccine (1 of 2) Never done    Colon Cancer Screen FIT/FOBT  05/12/2018    Potassium monitoring  01/20/2022    Creatinine monitoring  01/20/2022    Diabetes screen  03/24/2024    Lipid screen  02/26/2025    Flu vaccine  Completed    Pneumococcal 0-64 years Vaccine  Completed    Hepatitis C screen  Completed    HIV screen  Completed    Hepatitis A vaccine  Aged Out    Hepatitis B vaccine  Aged Out    Hib vaccine  Aged Out    Meningococcal (ACWY) vaccine  Aged Out       -rest of complaints with corresponding details per ROS    Review of Systems   Constitutional: Positive for fatigue. Negative for activity change, appetite change, chills, diaphoresis, fever and unexpected weight change. HENT: Negative for congestion, dental problem and hearing loss. Eyes: Positive for visual disturbance (stable, chronic). Respiratory: Positive for shortness of breath (ANDERSON). Negative for cough, chest tightness and wheezing. Cardiovascular: Negative for chest pain, palpitations and leg swelling. Gastrointestinal: Negative for abdominal distention, abdominal pain, constipation, diarrhea, nausea and vomiting. Endocrine: Negative for cold intolerance, heat intolerance, polydipsia, polyphagia and polyuria. Genitourinary: Negative for decreased urine volume, difficulty urinating, frequency and urgency.    Musculoskeletal: Positive for arthralgias (knees) and gait problem. Negative for back pain. Skin: Negative for rash. Allergic/Immunologic: Negative for environmental allergies. Neurological: Negative for dizziness, weakness and headaches. Hematological: Does not bruise/bleed easily. Psychiatric/Behavioral: Negative for decreased concentration, dysphoric mood and sleep disturbance. The patient is not nervous/anxious.          -vital signs stable and within normal limits except Overweight per BMI. /88   Pulse 87   Temp 99.7 °F (37.6 °C) (Temporal)   Ht 5' 11\" (1.803 m)   Wt 200 lb (90.7 kg)   SpO2 97%   BMI 27.89 kg/m²   Vitals:    03/24/21 1551   BP: 138/88   Pulse: 87   Temp: 99.7 °F (37.6 °C)   TempSrc: Temporal   SpO2: 97%   Weight: 200 lb (90.7 kg)   Height: 5' 11\" (1.803 m)     Estimated body mass index is 27.89 kg/m² as calculated from the following:    Height as of this encounter: 5' 11\" (1.803 m). Weight as of this encounter: 200 lb (90.7 kg). Physical Exam  Vitals signs and nursing note reviewed. Constitutional:       General: He is not in acute distress. Appearance: Normal appearance. He is well-developed. He is not diaphoretic. HENT:      Head: Normocephalic and atraumatic. Right Ear: Hearing, tympanic membrane, ear canal and external ear normal.      Left Ear: Hearing, tympanic membrane, ear canal and external ear normal.      Nose: No mucosal edema. Mouth/Throat:      Comments: I did not examine the mouth due to coronavirus pandemic and wearing masks. Eyes:      General: Lids are normal. No scleral icterus. Right eye: No discharge. Left eye: No discharge. Extraocular Movements: Extraocular movements intact. Conjunctiva/sclera: Conjunctivae normal.   Neck:      Musculoskeletal: Normal range of motion and neck supple. Thyroid: No thyromegaly. Cardiovascular:      Rate and Rhythm: Normal rate and regular rhythm. Heart sounds: Normal heart sounds. No murmur.    Pulmonary: Effort: Pulmonary effort is normal. No respiratory distress. Breath sounds: Examination of the right-lower field reveals decreased breath sounds. Examination of the left-lower field reveals decreased breath sounds. Decreased breath sounds present. No wheezing or rales. Chest:      Chest wall: No tenderness. Abdominal:      General: Bowel sounds are normal. There is no distension. Palpations: Abdomen is soft. There is no hepatomegaly or splenomegaly. Tenderness: There is no abdominal tenderness. Musculoskeletal: Normal range of motion. General: Tenderness present. Right knee: He exhibits normal range of motion. Tenderness found. Patellar tendon tenderness noted. Left knee: He exhibits normal range of motion. Tenderness found. Patellar tendon tenderness noted. Right lower leg: No edema. Left lower leg: No edema. Comments: Coarse crepitus bilateral knees. Walks with left knee in varus. Skin:     General: Skin is warm and dry. Capillary Refill: Capillary refill takes less than 2 seconds. Findings: No rash. Neurological:      Mental Status: He is alert and oriented to person, place, and time. Cranial Nerves: No cranial nerve deficit. Motor: No abnormal muscle tone. Coordination: Coordination normal.      Deep Tendon Reflexes: Reflexes normal.   Psychiatric:         Mood and Affect: Mood normal.         Behavior: Behavior normal.         Thought Content: Thought content normal.         Judgment: Judgment normal.         No flowsheet data found. I personally reviewed testing with patient.   Hyperglycemia  Otherwise labs within normal limits      Lab Results   Component Value Date    WBC 5.0 01/20/2021    HGB 14.2 01/20/2021    HCT 44.7 01/20/2021    MCV 93.3 01/20/2021     01/20/2021       Lab Results   Component Value Date     01/20/2021    K 4.7 01/20/2021     01/20/2021    CO2 23 01/20/2021    BUN 11 01/20/2021 each     Refill:  3    Glucosamine-Chondroitin-MSM (TRIPLE FLEX) 750-400-375 MG TABS     Sig: Take 2 tablets by mouth daily With food. NATURE MADE     Dispense:  180 tablet     Refill:  3         Medications Discontinued During This Encounter   Medication Reason    sucralfate (CARAFATE) 1 GM tablet Stop Taking at Discharge    pantoprazole (PROTONIX) 40 MG tablet Therapy completed    acetaminophen (TYLENOL) 325 MG tablet REORDER    Elastic Bandages & Supports (KNEE BRACE/HINGED BARS LARGE) MISC REORDER    aluminum chloride (DRYSOL) 20 % external solution REORDER    fluticasone-salmeterol (ADVAIR DISKUS) 500-50 MCG/DOSE diskus inhaler REORDER    amLODIPine (NORVASC) 10 MG tablet REORDER    albuterol sulfate HFA (VENTOLIN HFA) 108 (90 Base) MCG/ACT inhaler REORDER    naproxen (NAPROSYN) 250 MG tablet REORDER    ipratropium-albuterol (DUONEB) 0.5-2.5 (3) MG/3ML SOLN nebulizer solution REORDER         Orders Placed This Encounter   Procedures    POCT glycosylated hemoglobin (Hb A1C)         Future Appointments   Date Time Provider [place]   8/18/2021  4:15 PM Will Washington MD [facility]       This note was completed by using the assistance of a speech-recognition program. However, inadvertent computerized transcription errors may be present. Although every effort was made to ensure accuracy, no guarantees can be provided that every mistake has been identified and corrected by editing. An electronic signature was used to authenticate this note.     Electronically signed by Will Washington MD on 3/24/2021 at 6:06 PM

## 2021-03-24 NOTE — PATIENT INSTRUCTIONS
Patient Education       Schedule a Vaccine  When you qualify to receive the vaccine, call the [facility]) COVID-19 Vaccination Hotline to schedule your appointment or to get additional information about the [facility]) locations which are offering the COVID-19 vaccine. To be 94% effective, it's important that you receive two doses of one of the COVID-19 vaccines. -If you are receiving the Douglas Peter vaccine, your second shot will be scheduled as close to 21 days after the first shot as possible. -If you are receiving the Moderna vaccine, your second shot will be scheduled as close to 28 days after the first shot as possible. [facility]) COVID-19 Vaccination Hotline: [phone]    Links to [facility]) website and [facility] website:    [employer]/[facility]-monitoring-[email or web address]-covid-19/[email or web address]-19-vaccine/[email or web address]-[email or web address]    [email or web address]       Well Visit, Men 48 to 72: Care Instructions  Overview     Well visits can help you stay healthy. Your doctor has checked your overall health and may have suggested ways to take good care of yourself. Your doctor also may have recommended tests. At home, you can help prevent illness with healthy eating, regular exercise, and other steps. Follow-up care is a key part of your treatment and safety. Be sure to make and go to all appointments, and call your doctor if you are having problems. It's also a good idea to know your test results and keep a list of the medicines you take. How can you care for yourself at home? · Get screening tests that you and your doctor decide on. Screening helps find diseases before any symptoms appear. · Eat healthy foods. Choose fruits, vegetables, whole grains, protein, and low-fat dairy foods. Limit fat, especially saturated fat. Reduce salt in your diet. · Limit alcohol. Have no more than 2 drinks a day or 14 drinks a week.   · Get at least 30 minutes of exercise on most

## 2021-03-24 NOTE — RESULT ENCOUNTER NOTE
Addressed during office visit today, A1c 4.8 within normal limits, no prediabetes  Continue current treatment discussed during visit

## 2021-04-08 ENCOUNTER — OFFICE VISIT (OUTPATIENT)
Dept: ORTHOPEDIC SURGERY | Age: 56
End: 2021-04-08
Payer: COMMERCIAL

## 2021-04-08 VITALS — BODY MASS INDEX: 29.47 KG/M2 | WEIGHT: 199 LBS | HEIGHT: 69 IN

## 2021-04-08 DIAGNOSIS — M25.562 LEFT KNEE PAIN, UNSPECIFIED CHRONICITY: Primary | ICD-10-CM

## 2021-04-08 PROCEDURE — 99203 OFFICE O/P NEW LOW 30 MIN: CPT | Performed by: STUDENT IN AN ORGANIZED HEALTH CARE EDUCATION/TRAINING PROGRAM

## 2021-04-08 PROCEDURE — G8419 CALC BMI OUT NRM PARAM NOF/U: HCPCS | Performed by: STUDENT IN AN ORGANIZED HEALTH CARE EDUCATION/TRAINING PROGRAM

## 2021-04-08 PROCEDURE — 3017F COLORECTAL CA SCREEN DOC REV: CPT | Performed by: STUDENT IN AN ORGANIZED HEALTH CARE EDUCATION/TRAINING PROGRAM

## 2021-04-08 PROCEDURE — 4004F PT TOBACCO SCREEN RCVD TLK: CPT | Performed by: STUDENT IN AN ORGANIZED HEALTH CARE EDUCATION/TRAINING PROGRAM

## 2021-04-08 PROCEDURE — G8427 DOCREV CUR MEDS BY ELIG CLIN: HCPCS | Performed by: STUDENT IN AN ORGANIZED HEALTH CARE EDUCATION/TRAINING PROGRAM

## 2021-04-08 NOTE — PROGRESS NOTES
[facility]  [facility]  [address] [facility] [address]  Dept: [phone]    Ambulatory Orthopedic Consult    CHIEF COMPLAINT:    Chief Complaint   Patient presents with    Knee Pain     chronic        HISTORY OF PRESENT ILLNESS:      The patient is a 48 y.o. male who is being seen for consultation and evaluation of left knee pain. Patient states he feels he has had knee pain for roughly 4 to 5 years. He thinks he may have had a fracture to his left knee after he had fallen from some stairs. He states he never had any formal treatment after that incident. His pain in his left knee has progressively worsened and he has noted significant laxity and instability of the left knee. 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 States he takes naproxen for pain relief. He is a current smoker and has COPD. He wears a brace that he states provides him some mild relief. He admits to bilateral numbness/tingling both of his toes but was told that he does not have diabetes or neuropathy.     Past Medical History:    Past Medical History:   Diagnosis Date    Alcohol abuse     Allergic rhinitis     Asthma     COPD (chronic obstructive pulmonary disease) (HCC)     GERD (gastroesophageal reflux disease)     Hyperlipidemia with target LDL less than 100 8/29/2019    Hypertension     Pansinusitis     Primary osteoarthritis of both knees pe Xrays 8/30/2018       Past Surgical History:    Past Surgical History:   Procedure Laterality Date    ANKLE FRACTURE SURGERY Right     FACIAL COSMETIC SURGERY  1978    bit by a dog in the right cheek       Current Medications:   Current Outpatient Medications   Medication Sig Dispense Refill    naproxen (NAPROSYN) 250 MG tablet Take 1 tablet by mouth 2 times daily as needed for Pain ONLY AS NEEDED, CAN CAUSE GI BLEED, ULCERS AND KIDNEY FAILURE IF TAKING IT EVERY  tablet 0    albuterol sulfate HFA (VENTOLIN HFA) 108 (90 soft and compressible. EHL/FHL/TA/GS complex motor intact. Sural, saphenous, superificial/deep peroneal, and plantar nerve distribution SILT. Dorsalis pedis pulse 2+ with BCR. Laxity noted with anterior and posterior drawer examination. Lachman's test equivocal.  Significant valgus instability with only mild varus laxity on exam.  Significant varus thrust gait noted. Mild hyperextensibility noted to left knee. Left knee active range of motion 0 to 110 degrees. Radiology:  History:   Left knee pain    Comparison:   8/28/2018    Findings:   3 weightbearing x-ray views of the left knee including AP, lateral, tunnel view and 1 sunrise view of the left knee demonstrate severe osteoarthritic changes including joint space narrowing and osteophyte formation. There is significant lateral tibial translation. No acute fractures are noted on imaging. Impression:  Severe left knee osteoarthritis    ASSESSMENT:     1. Left knee pain, unspecified chronicity         PLAN:       We reviewed the patient's x-ray imaging in the office with him. We described with him at detail that his imaging demonstrates he has severe osteoarthritic changes to his left knee. We discussed all treatment options available to this patient and strongly recommended he would benefit from surgical management given the significant laxity in his left knee. Specifically we talked to the patient that he will need a specific type of total knee prosthesis that includes constrained options. The patient demonstrated complete understanding of her recommendations. He wishes to proceed with surgery. All treatment options including conservative versus surgical were discussed with the patient in detail. All questions answered appropriately.  Surgical risks including but not limited to: bleeding, blood clots, wound complications, infection, damage to surrounding tissues/nerves/vessels, malunion, nonunion, need for further surgery, loss of motion, stiffness, residual pain, risks of anesthesia, loss of limb and loss of life were all discussed with the patient. Knowing these risks, patient wishes to proceed with surgery. The patient was able to talk to our surgery scheduler in the office today. He will follow-up in our office 2 weeks after surgery for postoperative follow-up. Return for Post-operative follow up. No orders of the defined types were placed in this encounter.       Orders Placed This Encounter   Procedures    XR KNEE LEFT (MIN 4 VIEWS)     Standing Status:   Future     Number of Occurrences:   1     Standing Expiration Date:   4/8/2022        Electronically signed by Ammon Curling, DO on 4/8/2021 at 4:03 PM

## 2021-08-14 DIAGNOSIS — J44.9 COPD WITH ASTHMA (HCC): ICD-10-CM

## 2021-08-16 RX ORDER — ALBUTEROL SULFATE 90 UG/1
AEROSOL, METERED RESPIRATORY (INHALATION)
Qty: 18 G | Refills: 2 | Status: SHIPPED | OUTPATIENT
Start: 2021-08-16 | End: 2022-01-07 | Stop reason: SDUPTHER

## 2021-08-18 ENCOUNTER — OFFICE VISIT (OUTPATIENT)
Dept: FAMILY MEDICINE CLINIC | Age: 56
End: 2021-08-18
Payer: COMMERCIAL

## 2021-08-18 VITALS
HEART RATE: 88 BPM | BODY MASS INDEX: 29.62 KG/M2 | OXYGEN SATURATION: 98 % | SYSTOLIC BLOOD PRESSURE: 130 MMHG | WEIGHT: 200 LBS | DIASTOLIC BLOOD PRESSURE: 80 MMHG | HEIGHT: 69 IN | TEMPERATURE: 98.2 F

## 2021-08-18 DIAGNOSIS — G89.29 CHRONIC PAIN OF BOTH KNEES: ICD-10-CM

## 2021-08-18 DIAGNOSIS — M25.562 CHRONIC PAIN OF BOTH KNEES: ICD-10-CM

## 2021-08-18 DIAGNOSIS — M25.561 CHRONIC PAIN OF BOTH KNEES: ICD-10-CM

## 2021-08-18 DIAGNOSIS — Z13.6 SCREENING FOR CARDIOVASCULAR CONDITION: ICD-10-CM

## 2021-08-18 DIAGNOSIS — M17.0 PRIMARY OSTEOARTHRITIS OF BOTH KNEES: ICD-10-CM

## 2021-08-18 DIAGNOSIS — I10 ESSENTIAL HYPERTENSION: Primary | ICD-10-CM

## 2021-08-18 DIAGNOSIS — Z23 ENCOUNTER FOR IMMUNIZATION: ICD-10-CM

## 2021-08-18 DIAGNOSIS — J44.9 COPD WITH ASTHMA (HCC): ICD-10-CM

## 2021-08-18 DIAGNOSIS — J20.9 ACUTE BRONCHITIS DUE TO INFECTION: ICD-10-CM

## 2021-08-18 DIAGNOSIS — K21.9 GASTROESOPHAGEAL REFLUX DISEASE WITHOUT ESOPHAGITIS: ICD-10-CM

## 2021-08-18 PROCEDURE — 90715 TDAP VACCINE 7 YRS/> IM: CPT | Performed by: FAMILY MEDICINE

## 2021-08-18 PROCEDURE — G8419 CALC BMI OUT NRM PARAM NOF/U: HCPCS | Performed by: FAMILY MEDICINE

## 2021-08-18 PROCEDURE — 3017F COLORECTAL CA SCREEN DOC REV: CPT | Performed by: FAMILY MEDICINE

## 2021-08-18 PROCEDURE — 3023F SPIROM DOC REV: CPT | Performed by: FAMILY MEDICINE

## 2021-08-18 PROCEDURE — 4004F PT TOBACCO SCREEN RCVD TLK: CPT | Performed by: FAMILY MEDICINE

## 2021-08-18 PROCEDURE — G8427 DOCREV CUR MEDS BY ELIG CLIN: HCPCS | Performed by: FAMILY MEDICINE

## 2021-08-18 PROCEDURE — G8926 SPIRO NO PERF OR DOC: HCPCS | Performed by: FAMILY MEDICINE

## 2021-08-18 PROCEDURE — 99214 OFFICE O/P EST MOD 30 MIN: CPT | Performed by: FAMILY MEDICINE

## 2021-08-18 RX ORDER — NAPROXEN 250 MG/1
250 TABLET ORAL 2 TIMES DAILY PRN
Qty: 180 TABLET | Refills: 0 | Status: SHIPPED | OUTPATIENT
Start: 2021-08-18

## 2021-08-18 RX ORDER — AZITHROMYCIN 500 MG/1
500 TABLET, FILM COATED ORAL DAILY
Qty: 5 TABLET | Refills: 0 | Status: SHIPPED | OUTPATIENT
Start: 2021-08-18 | End: 2021-08-18

## 2021-08-18 RX ORDER — PANTOPRAZOLE SODIUM 40 MG/1
40 TABLET, DELAYED RELEASE ORAL DAILY
Qty: 90 TABLET | Refills: 3 | Status: SHIPPED | OUTPATIENT
Start: 2021-08-18 | End: 2022-09-08

## 2021-08-18 RX ORDER — AZITHROMYCIN 500 MG/1
500 TABLET, FILM COATED ORAL DAILY
Qty: 3 TABLET | Refills: 0 | Status: SHIPPED | OUTPATIENT
Start: 2021-08-18 | End: 2021-08-21

## 2021-08-18 ASSESSMENT — ENCOUNTER SYMPTOMS
CHEST TIGHTNESS: 0
WHEEZING: 1
CONSTIPATION: 0
ABDOMINAL DISTENTION: 0
COUGH: 1
DIARRHEA: 0
ABDOMINAL PAIN: 0
VOMITING: 0
NAUSEA: 0
SHORTNESS OF BREATH: 1

## 2021-08-18 NOTE — PROGRESS NOTES
Juancarlos Silva (:  1965) is a 48 y.o. male,Established patient, here for evaluation of the following chief complaint(s): Asthma, COPD, and Hypertension      ASSESSMENT/PLAN:    1. Essential hypertension  Well controlled. Continue current treatment. Amlodipine 10 mg daily  Will recheck labs. Discussed low salt diet and BP and pulse monitoring.    -     CBC; Future  -     Comprehensive Metabolic Panel; Future  2. Acute bronchitis due to infection  Worsening  -     azithromycin (ZITHROMAX) 500 MG tablet; Take 1 tablet by mouth daily for 3 days, Disp-3 tablet, R-0Normal  3. COPD with asthma ([facility] [address]  Worsening  Treat, bronchitis avoid steroids  -     fluticasone-salmeterol (ADVAIR DISKUS) 500-50 MCG/DOSE diskus inhaler; Inhale 1 puff into the lungs every 12 hours, Disp-60 each, R-5Normal  -     XR CHEST (2 VW); Future  -     azithromycin (ZITHROMAX) 500 MG tablet; Take 1 tablet by mouth daily for 3 days, Disp-3 tablet, R-0Normal  Nebulizer treatments 4 times a day as needed  Strongly advised to cut down smoking from 1.5 packs/day down to 1 pack/day by his birthday, down to half a pack by 2022. He declines any medications he will cut down on his own  The patient verbalizes understanding and agrees with the plan. 4. Chronic pain of both knees  Worsening  Wearing knee braces  Strongly advised to consider left knee replacement but he was told he will be off work for 6 months, he cannot afford it  -     naproxen (NAPROSYN) 250 MG tablet; Take 1 tablet by mouth 2 times daily as needed for Pain ONLY AS NEEDED, CAN CAUSE GI BLEED, ULCERS AND KIDNEY FAILURE IF TAKING IT EVERY DAY, Disp-180 tablet, R-0Normal  Advised to alternate with Tylenol extra strength    5. Primary osteoarthritis of both knees pe Xrays  Likely worsening due to wear and tear nature of the disease.   -     naproxen (NAPROSYN) 250 MG tablet;  Take 1 tablet by mouth 2 times daily as needed for Pain ONLY AS NEEDED, CAN CAUSE GI BLEED, ULCERS AND KIDNEY FAILURE IF TAKING IT EVERY DAY, Disp-180 tablet, R-0Normal  6. Gastroesophageal reflux disease without esophagitis  Improved with medication continue Protonix especially because he has to take naproxen every day  -     pantoprazole (PROTONIX) 40 MG tablet; Take 1 tablet by mouth daily, Disp-90 tablet, R-3Normal  7. Screening for cardiovascular condition  -     Lipid Panel; Future  8. Encounter for immunization  -     Tdap (age 6y and older) IM ([address] [place] [address] [address])      Alycia Beltre received counseling on the following healthy behaviors: nutrition, exercise, medication adherence, tobacco cessation and weight loss  Reviewed prior labs and health maintenance  Discussed use, benefit, and side effects of prescribed medications. Barriers to medication compliance addressed. Patient given educational materials - see patient instructions  Was a self-tracking handout given in paper form or via [employer]? Yes  All patient questions answered. Patient voiced understanding. The patient's past medical,surgical, social, and family history as well as his current medications and allergies were reviewed as documented in today's encounter. Medications, labs, diagnostic studies, consultations and follow-up as documented in this encounter. Return in about 4 months (around 12/18/2021). Data Unavailable    Future Appointments   Date Time Provider Sha Porter   1/7/2022  3:45 PM Bethanie Spencer MD [facility]         SUBJECTIVE/OBJECTIVE:    Hypertension: Patient here for follow-up. He is not exercising, but staying very active, and is adherent to low salt diet. Blood pressure is well controlled at home. Cardiac symptoms dyspnea and fatigue. Patient denies chest pain, chest pressure/discomfort, claudication, exertional chest pressure/discomfort, irregular heart beat, lower extremity edema, near-syncope, orthopnea, palpitations, paroxysmal nocturnal dyspnea, syncope and tachypnea. Cardiovascular risk factors: dyslipidemia, hypertension, male gender and smoking/ tobacco exposure. Use of agents associated with hypertension: NSAIDS. History of target organ damage: none. BP controlled. Neo Griffin reports compliance with BP medications, and tolerates them well, denies side effects. BP Readings from Last 3 Encounters:   08/18/21 130/80   03/24/21 138/88   11/13/20 118/78          Pulse is Normal.    Pulse Readings from Last 3 Encounters:   08/18/21 88   03/24/21 87   11/13/20 74       COPD and asthma:  Current treatment includes short-acting beta agonist inhaler, nebulized DuoNebs, combined beta agonist/antichoinergic inhaler, which has been somewhat effective. Residual symptoms: chronic dyspnea, cough productive of green sputum in moderate amounts and wheezing. Patient reports the mucus has been green for about 1 week progressively getting worse  He is requesting a chest x-ray  Denies fever, chills, night sweats. He denies chest pain. He requires his rescue inhaler 1- 2 time(s) per day for the past 1 week    Nicotine dependence. Smoker, counseling given to quit smoking. He says he is currently smoking 1.5 packs/day. I updated his smoking history, after his visit, will order CT lung screening at the next appointment  Ready to quit: No  Counseling given: Yes      Patient continues to complain of bilateral knee pain, due to severe osteoarthritis, taking naproxen every day, denies pain waking him up at nighttime. Cannot stand up for too long, cannot climb up stairs. Has been wearing the knee braces. He has seen orthopedics and they told him he needs knee replacements. Left knee is worse. he has tried steroid shots but did not help. He has tried triple flex but did not help. GERD and Acid reflux  Taking naproxen once a day.   Patient says if not taking pantoprazole gets heartburn  He denies nausea, vomiting, diarrhea or constipation      Prior to Visit Medications Medication Sig Taking? Authorizing Provider   albuterol sulfate  (90 Base) MCG/ACT inhaler INHALE TWO PUFFS BY MOUTH EVERY 6 HOURS AS NEEDED FOR WHEEZING OR SHORTNESS OF BREATH (COUGH) Yes Mariah Arthur MD   aluminum chloride (DRYSOL) 20 % external solution Apply topically nightly. Yes Mariah Arthur MD   amLODIPine (NORVASC) 10 MG tablet TAKE ONE TABLET BY MOUTH DAILY Yes Mariah Arthur MD   fluticasone-salmeterol (ADVAIR DISKUS) 500-50 MCG/DOSE diskus inhaler Inhale 1 puff into the lungs every 12 hours Yes Mariah Arthur MD   ipratropium-albuterol (DUONEB) 0.5-2.5 (3) MG/3ML SOLN nebulizer solution Take 3 mLs by nebulization every 6 hours as needed for Shortness of Breath or Other (dyspnea, wheezing) Yes Mariah Arthur MD   naproxen (NAPROSYN) 250 MG tablet Take 1 tablet by mouth 2 times daily as needed for Pain ONLY AS NEEDED, CAN CAUSE GI BLEED, ULCERS AND KIDNEY FAILURE IF TAKING IT Elizabethtrae Coreaie Yes Mariah Arthur MD   pantoprazole (PROTONIX) 40 MG tablet Take 1 tablet by mouth daily Yes Mariah Arthur MD   acetaminophen (TYLENOL) 500 MG tablet Take 1 tablet by mouth every 6 hours as needed for Pain Yes Mariah Arthur MD   Elastic Bandages & Supports (KNEE BRACE/HINGED BARS LARGE) MISC Needs knee brace bilaterally, hinged. Yes Mariah Arthur MD   Glucosamine-Chondroitin-MSM (TRIPLE FLEX) 750-400-375 MG TABS Take 2 tablets by mouth daily With food. NATURE MADE Yes Mariah Arthur MD       Social History     Tobacco Use    Smoking status: Current Every Day Smoker     Packs/day: 1.50     Years: 20.00     Pack years: 30.00     Types: Cigarettes    Smokeless tobacco: Never Used   Substance Use Topics    Alcohol use: Yes     Alcohol/week: 12.0 standard drinks     Types: 12 Cans of beer per week     Comment: 6 beers per day    Drug use: No         Review of Systems   Constitutional: Positive for fatigue.  Negative for activity change, appetite change, chills, normal. There is no distension. Palpations: Abdomen is soft. There is no hepatomegaly or splenomegaly. Tenderness: There is no abdominal tenderness. Comments: Obese abdomen. Musculoskeletal:         General: Tenderness present. Normal range of motion. Cervical back: Normal range of motion and neck supple. Right knee: Normal range of motion. Tenderness present over the patellar tendon. Left knee: Normal range of motion. Tenderness present over the patellar tendon. Right lower leg: No edema. Left lower leg: No edema. Comments: Coarse crepitus bilateral knees. Walks with left knee in varus. Skin:     General: Skin is warm and dry. Capillary Refill: Capillary refill takes less than 2 seconds. Findings: No rash. Neurological:      Mental Status: He is alert and oriented to person, place, and time. Cranial Nerves: No cranial nerve deficit. Motor: No abnormal muscle tone. Coordination: Coordination normal.      Deep Tendon Reflexes: Reflexes normal.   Psychiatric:         Mood and Affect: Mood normal.         Behavior: Behavior normal.         Thought Content: Thought content normal.         Judgment: Judgment normal.           I personally reviewed testing with patient and all questions fully answered.     Hyperglycemia  Otherwise labs within normal limits    Office Visit on 03/24/2021   Component Date Value Ref Range Status    Hemoglobin A1C 03/24/2021 4.8  % Final         Lab Results   Component Value Date    WBC 5.0 01/20/2021    HGB 14.2 01/20/2021    HCT 44.7 01/20/2021    MCV 93.3 01/20/2021     01/20/2021       Lab Results   Component Value Date     01/20/2021    K 4.7 01/20/2021     01/20/2021    CO2 23 01/20/2021    BUN 11 01/20/2021    CREATININE 0.87 01/20/2021    GLUCOSE 133 01/20/2021    CALCIUM 9.4 01/20/2021      Lab Results   Component Value Date    LABA1C 4.8 03/24/2021       Lab Results   Component Value Date ALT 20 01/20/2021    AST 23 01/20/2021    ALKPHOS 89 01/20/2021    BILITOT 0.38 01/20/2021       Lab Results   Component Value Date    TSH 1.68 01/20/2021       Lab Results   Component Value Date    CHOL 194 02/26/2020    CHOL 162 08/28/2018     Lab Results   Component Value Date    TRIG 43 02/26/2020    TRIG 31 08/28/2018     Lab Results   Component Value Date     02/26/2020     08/28/2018     Lab Results   Component Value Date    LDLCHOLESTEROL 73 02/26/2020    LDLCHOLESTEROL 54 08/28/2018     Lab Results   Component Value Date    CHOLHDLRATIO 1.7 02/26/2020    CHOLHDLRATIO 1.6 08/28/2018       Lab Results   Component Value Date    OQHHTKBT03 426 02/26/2020     Lab Results   Component Value Date    FOLATE 10.1 02/26/2020     No results found for: VITD25      Lab Results   Component Value Date    PSA 0.42 01/20/2021         Orders Placed This Encounter   Medications    fluticasone-salmeterol (ADVAIR DISKUS) 500-50 MCG/DOSE diskus inhaler     Sig: Inhale 1 puff into the lungs every 12 hours     Dispense:  60 each     Refill:  5    naproxen (NAPROSYN) 250 MG tablet     Sig: Take 1 tablet by mouth 2 times daily as needed for Pain ONLY AS NEEDED, CAN CAUSE GI BLEED, ULCERS AND KIDNEY FAILURE IF TAKING IT EVERY DAY     Dispense:  180 tablet     Refill:  0    pantoprazole (PROTONIX) 40 MG tablet     Sig: Take 1 tablet by mouth daily     Dispense:  90 tablet     Refill:  3    DISCONTD: azithromycin (ZITHROMAX) 500 MG tablet     Sig: Take 1 tablet by mouth daily for 5 days     Dispense:  5 tablet     Refill:  0    azithromycin (ZITHROMAX) 500 MG tablet     Sig: Take 1 tablet by mouth daily for 3 days     Dispense:  3 tablet     Refill:  0       Orders Placed This Encounter   Procedures    XR CHEST (2 VW)     Standing Status:   Future     Standing Expiration Date:   8/18/2022     Order Specific Question:   Reason for exam:     Answer:   Cough & SOB    Tdap (age 6y and older) IM (BOOSTRIX)    CBC Standing Status:   Future     Standing Expiration Date:   8/18/2022    Comprehensive Metabolic Panel     Standing Status:   Future     Standing Expiration Date:   8/18/2022    Lipid Panel     Standing Status:   Future     Standing Expiration Date:   8/18/2022     Order Specific Question:   Is Patient Fasting?/# of Hours     Answer:   8-10 Hours, water ok to drink       Medications Discontinued During This Encounter   Medication Reason    pantoprazole (PROTONIX) 40 MG tablet Patient Choice    Glucosamine-Chondroitin-MSM (TRIPLE FLEX) 750-400-375 MG TABS Patient Choice    fluticasone-salmeterol (ADVAIR DISKUS) 500-50 MCG/DOSE diskus inhaler REORDER    naproxen (NAPROSYN) 250 MG tablet REORDER    azithromycin (ZITHROMAX) 500 MG tablet          On this date 8/18/2021 I have spent 35 minutes reviewing previous notes, test results and face to face with the patient discussing the diagnosis and importance of compliance with the treatment plan as well as documenting on the day of the visit. This note was completed by using the assistance of a speech-recognition program. However, inadvertent computerized transcription errors may be present. Although every effort was made to ensure accuracy, no guarantees can be provided that every mistake has been identified and corrected by editing. An electronic signature was used to authenticate this note.   Electronically signed by Rohini Voss MD on 8/18/2021 at 7:27 PM

## 2021-08-18 NOTE — PATIENT INSTRUCTIONS
Patient Education        Stopping Smoking: Care Instructions  Your Care Instructions     Cigarette smokers crave the nicotine in cigarettes. Giving it up is much harder than simply changing a habit. Your body has to stop craving the nicotine. It is hard to quit, but you can do it. There are many tools that people use to quit smoking. You may find that combining tools works best for you. There are several steps to quitting. First you get ready to quit. Then you get support to help you. After that, you learn new skills and behaviors to become a nonsmoker. For many people, a necessary step is getting and using medicine. Your doctor will help you set up the plan that best meets your needs. You may want to attend a smoking cessation program to help you quit smoking. When you choose a program, look for one that has proven success. Ask your doctor for ideas. You will greatly increase your chances of success if you take medicine as well as get counseling or join a cessation program.  Some of the changes you feel when you first quit tobacco are uncomfortable. Your body will miss the nicotine at first, and you may feel short-tempered and grumpy. You may have trouble sleeping or concentrating. Medicine can help you deal with these symptoms. You may struggle with changing your smoking habits and rituals. The last step is the tricky one: Be prepared for the smoking urge to continue for a time. This is a lot to deal with, but keep at it. You will feel better. Follow-up care is a key part of your treatment and safety. Be sure to make and go to all appointments, and call your doctor if you are having problems. It's also a good idea to know your test results and keep a list of the medicines you take. How can you care for yourself at home? · Ask your family, friends, and coworkers for support. You have a better chance of quitting if you have help and support.   · Join a support group, such as Nicotine Anonymous, for people who are trying to quit smoking. · Consider signing up for a smoking cessation program, such as the American Lung Association's Freedom from Smoking program.  · Get text messaging support. Go to the website at www.smokefree. gov to sign up for the [facility] program.  · Set a quit date. Pick your date carefully so that it is not right in the middle of a big deadline or stressful time. Once you quit, do not even take a puff. Get rid of all ashtrays and lighters after your last cigarette. Clean your house and your clothes so that they do not smell of smoke. · Learn how to be a nonsmoker. Think about ways you can avoid those things that make you reach for a cigarette. ? Avoid situations that put you at greatest risk for smoking. For some people, it is hard to have a drink with friends without smoking. For others, they might skip a coffee break with coworkers who smoke. ? Change your daily routine. Take a different route to work or eat a meal in a different place. · Cut down on stress. Calm yourself or release tension by doing an activity you enjoy, such as reading a book, taking a hot bath, or gardening. · Talk to your doctor or pharmacist about nicotine replacement therapy, which replaces the nicotine in your body. You still get nicotine but you do not use tobacco. Nicotine replacement products help you slowly reduce the amount of nicotine you need. These products come in several forms, many of them available over-the-counter:  ? Nicotine patches  ? Nicotine gum and lozenges  ? Nicotine inhaler  · Ask your doctor about bupropion (Wellbutrin) or varenicline (Chantix), which are prescription medicines. They do not contain nicotine. They help you by reducing withdrawal symptoms, such as stress and anxiety. · Some people find hypnosis, acupuncture, and massage helpful for ending the smoking habit. · Eat a healthy diet and get regular exercise.  Having healthy habits will help your body move past its craving for nicotine. · Be prepared to keep trying. Most people are not successful the first few times they try to quit. Do not get mad at yourself if you smoke again. Make a list of things you learned and think about when you want to try again, such as next week, next month, or next year. Where can you learn more? Go to https://chpepiceweb.Funifi. org and sign in to your [employer] account. Enter Q299 in the [facility] box to learn more about \"Stopping Smoking: Care Instructions. \"     If you do not have an account, please click on the \"Sign Up Now\" link. Current as of: February 11, 2021               Content Version: 12.9  © 2006-2021 [employer]. Care instructions adapted under license by [facility] ([facility]). If you have questions about a medical condition or this instruction, always ask your healthcare professional. [place] [address] any warranty or liability for your use of this information. Patient Education        Chronic Obstructive Pulmonary Disease (COPD): Care Instructions  Your Care Instructions     Chronic obstructive pulmonary disease (COPD) is a general term for a group of lung diseases, including emphysema and chronic bronchitis. People with COPD have decreased airflow in and out of the lungs, which makes it hard to breathe. The airways also can get clogged with thick mucus. Cigarette smoking is a major cause of COPD. Although there is no cure for COPD, you can slow its progress. Following your treatment plan and taking care of yourself can help you feel better and live longer. Follow-up care is a key part of your treatment and safety. Be sure to make and go to all appointments, and call your doctor if you are having problems. It's also a good idea to know your test results and keep a list of the medicines you take. How can you care for yourself at home? Staying healthy    · Do not smoke.  This is the most important step you can take to prevent more damage to your lungs. If you need help quitting, talk to your doctor about stop-smoking programs and medicines. These can increase your chances of quitting for good.     · Avoid colds and flu. Get a pneumococcal vaccine shot. If you have had one before, ask your doctor whether you need a second dose. Get the flu vaccine every fall. If you must be around people with colds or the flu, wash your hands often.     · Avoid secondhand smoke, air pollution, and high altitudes. Also avoid cold, dry air and hot, humid air. Stay at home with your windows closed when air pollution is bad. Medicines and oxygen therapy    · Take your medicines exactly as prescribed. Call your doctor if you think you are having a problem with your medicine. You may be taking medicines such as:  ? Bronchodilators. These help open your airways and make breathing easier. They are either short-acting (work for 6 to 9 hours) or long-acting (work for 24 hours). You inhale most bronchodilators, so they start to act quickly. Always carry your quick-relief inhaler with you in case you need it while you are away from home. ? Corticosteroids (prednisone, budesonide). These reduce airway inflammation. They come in pill or inhaled form. You must take these medicines every day for them to work well.     · Ask your doctor or pharmacist if a spacer is right for you. A spacer may help you get more inhaled medicine to your lungs. If you use one, ask how to use it properly.     · Do not take any vitamins, over-the-counter medicine, or herbal products without talking to your doctor first.     · If your doctor prescribed antibiotics, take them as directed. Do not stop taking them just because you feel better. You need to take the full course of antibiotics.     · If you use oxygen therapy, use the flow rate your doctor has recommended.  Don't change it without talking to your doctor first. Oxygen therapy boosts the amount of oxygen in your blood and helps you breathe easier. Activity    · Get regular exercise. Walking is an easy way to get exercise. Start out slowly, and walk a little more each day.     · Pay attention to your breathing. You are exercising too hard if you can't talk while you exercise.     · Take short rest breaks when doing household chores and other activities.     · Learn breathing methods--such as breathing through pursed lips--to help you become less short of breath.     · If your doctor has not set you up with a pulmonary rehabilitation program, ask if rehab is right for you. Rehab includes exercise programs, education about your disease and how to manage it, help with diet and other changes, and emotional support. Diet    · Eat regular, healthy meals. Use bronchodilators about 1 hour before you eat to make it easier to eat. Eat several small meals instead of three large ones. Drink beverages at the end of the meal. Avoid foods that are hard to chew.     · Eat foods that contain protein so you don't lose muscle mass.     · Talk with your doctor if you gain too much weight or if you lose weight without trying. Mental health    · Talk to your family, friends, or a therapist about your feelings. Some people feel frightened, angry, hopeless, helpless, and even guilty. Talking openly about bad feelings can help you cope. If these feelings last, talk to your doctor. When should you call for help? Call 911 anytime you think you may need emergency care. For example, call if:    · You have severe trouble breathing. Call your doctor now or seek immediate medical care if:    · You have new or worse trouble breathing.     · You cough up blood.     · You have a fever.    Watch closely for changes in your health, and be sure to contact your doctor if:    · You cough more deeply or more often, especially if you notice more mucus or a change in the color of your mucus.     · You have new or worse swelling in your legs or belly.     · You are not getting better as expected. Where can you learn more? Go to https://chpepiceweb.healthDavra Networks. org and sign in to your [employer] account. Enter J966 in the [facility] box to learn more about \"Chronic Obstructive Pulmonary Disease (COPD): Care Instructions. \"     If you do not have an account, please click on the \"Sign Up Now\" link. Current as of: October 26, 2020               Content Version: 12.9  © 2006-2021 [employer]wise[employer] Incorporated. Care instructions adapted under license by [facility] ([facility]). If you have questions about a medical condition or this instruction, always ask your healthcare professional. [address] any warranty or liability for your use of this information.

## 2021-09-05 ENCOUNTER — TELEPHONE (OUTPATIENT)
Dept: FAMILY MEDICINE CLINIC | Age: 56
End: 2021-09-05

## 2021-09-05 DIAGNOSIS — J45.901 ASTHMA EXACERBATION IN COPD (HCC): ICD-10-CM

## 2021-09-05 DIAGNOSIS — J20.9 ACUTE BRONCHITIS DUE TO INFECTION: Primary | ICD-10-CM

## 2021-09-05 DIAGNOSIS — J44.1 ASTHMA EXACERBATION IN COPD (HCC): ICD-10-CM

## 2021-09-05 RX ORDER — PREDNISONE 10 MG/1
50 TABLET ORAL DAILY
Qty: 35 TABLET | Refills: 0 | Status: SHIPPED | OUTPATIENT
Start: 2021-09-05 | End: 2021-09-12

## 2021-09-05 RX ORDER — CEFUROXIME AXETIL 500 MG/1
500 TABLET ORAL 2 TIMES DAILY
Qty: 20 TABLET | Refills: 0 | Status: SHIPPED | OUTPATIENT
Start: 2021-09-05 | End: 2021-09-15

## 2021-09-05 RX ORDER — CEFUROXIME AXETIL 500 MG/1
500 TABLET ORAL 2 TIMES DAILY
Qty: 20 TABLET | Refills: 0 | Status: SHIPPED | OUTPATIENT
Start: 2021-09-05 | End: 2021-09-05 | Stop reason: SDUPTHER

## 2021-09-05 NOTE — TELEPHONE ENCOUNTER
Patient reports he still has cough, \"still has bronchitis\", green mucus  Denies wheezing  dyspnea on exertion is ok when using the albuterol, which he has been using it a few times a day  Son has a sinus infection, and his wife has a similar infection  Patient reports his son was tested for COVID-19 today at an urgent care but no antibiotics were given. He is fully vaccinated against COVID-19    Patient was seen in the office and he was given Zpack on 8/18/21, however he continues to smoke, he says he did cut down a lot on smoking. Strongly advised to quit smoking. He received Rocephin in the past  Strongly advised to quit smoking completely for 7 days when sick, he is agreeable  New medication sent to the pharmacy   Diagnosis Orders   1. Acute bronchitis due to infection  predniSONE (DELTASONE) 10 MG tablet    cefUROXime (CEFTIN) 500 MG tablet    DISCONTINUED: cefUROXime (CEFTIN) 500 MG tablet   2.  Ast[facility] exacerbation in COPD ([facility]  predniSONE (DELTASONE) 10 MG tablet    cefUROXime (CEFTIN) 500 MG tablet    DISCONTINUED: cefUROXime (CEFTIN) 500 MG tablet

## 2021-09-15 ENCOUNTER — HOSPITAL ENCOUNTER (OUTPATIENT)
Age: 56
Discharge: HOME | End: 2021-09-17
Payer: COMMERCIAL

## 2021-09-15 ENCOUNTER — HOSPITAL ENCOUNTER (OUTPATIENT)
Age: 56
Discharge: HOME | End: 2021-09-15
Payer: COMMERCIAL

## 2021-09-15 ENCOUNTER — HOSPITAL ENCOUNTER (OUTPATIENT)
Dept: GENERAL RADIOLOGY | Age: 56
Discharge: HOME | End: 2021-09-17
Payer: COMMERCIAL

## 2021-09-15 DIAGNOSIS — J44.9 COPD WITH ASTHMA (HCC): ICD-10-CM

## 2021-09-15 DIAGNOSIS — Z12.11 SCREEN FOR COLON CANCER: ICD-10-CM

## 2021-09-15 DIAGNOSIS — Z13.6 SCREENING FOR CARDIOVASCULAR CONDITION: ICD-10-CM

## 2021-09-15 DIAGNOSIS — I10 ESSENTIAL HYPERTENSION: ICD-10-CM

## 2021-09-15 DIAGNOSIS — D64.9 ANEMIA, UNSPECIFIED TYPE: Primary | ICD-10-CM

## 2021-09-15 LAB
ALBUMIN SERPL-MCNC: 4.4 G/DL (ref 3.5–5.2)
ALBUMIN/GLOBULIN RATIO: ABNORMAL (ref 1–2.5)
ALP BLD-CCNC: 75 U/L (ref 40–129)
ALT SERPL-CCNC: 25 U/L (ref 5–41)
ANION GAP SERPL CALCULATED.3IONS-SCNC: 11 MMOL/L (ref 9–17)
AST SERPL-CCNC: 21 U/L
BILIRUB SERPL-MCNC: 0.31 MG/DL (ref 0.3–1.2)
BUN BLDV-MCNC: 21 MG/DL (ref 6–20)
BUN/CREAT BLD: ABNORMAL (ref 9–20)
CALCIUM SERPL-MCNC: 8.8 MG/DL (ref 8.6–10.4)
CHLORIDE BLD-SCNC: 108 MMOL/L (ref 98–107)
CHOLESTEROL/HDL RATIO: 1.7
CHOLESTEROL: 174 MG/DL
CO2: 27 MMOL/L (ref 20–31)
CREAT SERPL-MCNC: 0.91 MG/DL (ref 0.7–1.2)
GFR AFRICAN AMERICAN: >60 ML/MIN
GFR NON-AFRICAN AMERICAN: >60 ML/MIN
GFR SERPL CREATININE-BSD FRML MDRD: ABNORMAL ML/MIN/{1.73_M2}
GFR SERPL CREATININE-BSD FRML MDRD: ABNORMAL ML/MIN/{1.73_M2}
GLUCOSE BLD-MCNC: 110 MG/DL (ref 70–99)
HCT VFR BLD CALC: 40.1 % (ref 41–53)
HDLC SERPL-MCNC: 105 MG/DL
HEMOGLOBIN: 13.5 G/DL (ref 13.5–17.5)
LDL CHOLESTEROL: 62 MG/DL (ref 0–130)
MCH RBC QN AUTO: 31.1 PG (ref 26–34)
MCHC RBC AUTO-ENTMCNC: 33.8 G/DL (ref 31–37)
MCV RBC AUTO: 92.2 FL (ref 80–100)
NRBC AUTOMATED: ABNORMAL PER 100 WBC
PDW BLD-RTO: 14.1 % (ref 11.5–14.9)
PLATELET # BLD: 233 K/UL (ref 150–450)
PMV BLD AUTO: 7 FL (ref 6–12)
POTASSIUM SERPL-SCNC: 4.1 MMOL/L (ref 3.7–5.3)
RBC # BLD: 4.35 M/UL (ref 4.5–5.9)
SODIUM BLD-SCNC: 146 MMOL/L (ref 135–144)
TOTAL PROTEIN: 6.8 G/DL (ref 6.4–8.3)
TRIGL SERPL-MCNC: 35 MG/DL
VLDLC SERPL CALC-MCNC: NORMAL MG/DL (ref 1–30)
WBC # BLD: 5.6 K/UL (ref 3.5–11)

## 2021-09-15 PROCEDURE — 80053 COMPREHEN METABOLIC PANEL: CPT

## 2021-09-15 PROCEDURE — 71046 X-RAY EXAM CHEST 2 VIEWS: CPT

## 2021-09-15 PROCEDURE — 85027 COMPLETE CBC AUTOMATED: CPT

## 2021-09-15 PROCEDURE — 36415 COLL VENOUS BLD VENIPUNCTURE: CPT

## 2021-09-15 PROCEDURE — 80061 LIPID PANEL: CPT

## 2021-09-15 NOTE — RESULT ENCOUNTER NOTE
Please notify patient, normal chest x-ray, no pneumonia, continue current treatment    Future Appointments  1/7/2022   3:45 PM    MD yaima Cardona

## 2021-09-15 NOTE — RESULT ENCOUNTER NOTE
Please notify patient. I need to refer him to have egd and colonoscopy, referral placed to GI, please give him contact information to schedule appointment.   Men do not have anemia unless they have bleeding without being seen which is usually through the GI system  His urine done 1/20/2021 did not have any blood was normal      Future Appointments  1/7/2022   3:45 PM    Bethanie Spencer MD     [facility]               [facility]

## 2021-09-15 NOTE — RESULT ENCOUNTER NOTE
Please notify patient. Blood glucose high 110 low-carb diet is advised. Kidney function is slightly high and he is dehydrated with high sodium and chloride, to drink 8 x 8 ounce glasses of water every day and to stop naproxen    He does have anemia which is new he never had it before, which can be related to possible gastritis or an ulcer if he has any upper abdominal pain. DOES HE HAVE ANY pain in his abdomen? Please cc back answer to me    Otherwise labs within normal limits  Continue current treatment.     Future Appointments  1/7/2022   3:45 PM    Ingrid Mcdonough MD     [facility]               [facility]

## 2021-09-24 DIAGNOSIS — L74.513 HYPERHIDROSIS OF SOLES: ICD-10-CM

## 2021-10-08 ENCOUNTER — TELEPHONE (OUTPATIENT)
Dept: GASTROENTEROLOGY | Age: 56
End: 2021-10-08

## 2021-12-28 ENCOUNTER — HOSPITAL ENCOUNTER (EMERGENCY)
Age: 56
Discharge: HOME | End: 2021-12-28
Attending: EMERGENCY MEDICINE
Payer: COMMERCIAL

## 2021-12-28 ENCOUNTER — APPOINTMENT (OUTPATIENT)
Dept: GENERAL RADIOLOGY | Age: 56
End: 2021-12-28
Payer: COMMERCIAL

## 2021-12-28 VITALS
SYSTOLIC BLOOD PRESSURE: 138 MMHG | RESPIRATION RATE: 18 BRPM | TEMPERATURE: 98.5 F | DIASTOLIC BLOOD PRESSURE: 84 MMHG | WEIGHT: 197 LBS | HEART RATE: 97 BPM | BODY MASS INDEX: 29.09 KG/M2 | OXYGEN SATURATION: 97 %

## 2021-12-28 DIAGNOSIS — U07.1 COVID-19: Primary | ICD-10-CM

## 2021-12-28 LAB
SARS-COV-2, RAPID: DETECTED
SPECIMEN DESCRIPTION: ABNORMAL

## 2021-12-28 PROCEDURE — 71045 X-RAY EXAM CHEST 1 VIEW: CPT

## 2021-12-28 PROCEDURE — 87635 SARS-COV-2 COVID-19 AMP PRB: CPT

## 2021-12-28 PROCEDURE — 99283 EMERGENCY DEPT VISIT LOW MDM: CPT

## 2021-12-28 RX ORDER — PREDNISONE 20 MG/1
40 TABLET ORAL DAILY
Qty: 10 TABLET | Refills: 0 | Status: SHIPPED | OUTPATIENT
Start: 2021-12-28 | End: 2022-01-02

## 2021-12-28 ASSESSMENT — PAIN DESCRIPTION - FREQUENCY: FREQUENCY: CONTINUOUS

## 2021-12-28 ASSESSMENT — PAIN DESCRIPTION - DESCRIPTORS: DESCRIPTORS: DISCOMFORT

## 2021-12-28 ASSESSMENT — PAIN DESCRIPTION - PAIN TYPE: TYPE: ACUTE PAIN

## 2021-12-28 ASSESSMENT — PAIN SCALES - GENERAL: PAINLEVEL_OUTOF10: 10

## 2021-12-28 ASSESSMENT — PAIN DESCRIPTION - LOCATION: LOCATION: GENERALIZED

## 2021-12-28 NOTE — RESULT ENCOUNTER NOTE
Addressed in ED   Chest x-ray within normal limits, COVID-19 positive    Future Appointments  1/7/2022   3:45 PM    Governor MD yaima Kumar               [facility]

## 2021-12-28 NOTE — Clinical Note
King Hernandez was seen and treated in our emergency department on 12/28/2021. He may return to work on 01/07/2022. If you have any questions or concerns, please don't hesitate to call.       Glen Singh PA-C

## 2021-12-28 NOTE — ED PROVIDER NOTES
[address]  eMERGENCY dEPARTMENT eNCOUnter      Pt Name: Greg Bower  MRN: [id number]  [place] 1965  Date of evaluation: 12/28/2021  Provider: Ailyn Freedman PA-C    CHIEF COMPLAINT       Chief Complaint   Patient presents with    Sinusitis           HISTORY OF PRESENT ILLNESS  (Location/Symptom, Timing/Onset, Context/Setting, Quality, Duration, Modifying Factors, Severity.)   Greg Bower is a 55 y.o. male who presents to the emergency department for evaluation of sinus headache, congestion, chills, and body aches x 1 week. Pt denies fever, cough, chest pain, sob, nausea, emesis, abd pain. Pt does smoke. He is vaccinated. No other complaints. Has not tried any medications at home. Nursing Notes were reviewed. REVIEW OF SYSTEMS    (2-9 systems for level 4, 10 or more for level 5)     Review of Systems   Sinus headache   Congestion  Chills  Body aches       Except as noted above the remainder of the review of systems was reviewed and negative.        PAST MEDICAL HISTORY     Past Medical History:   Diagnosis Date    Alcohol abuse     Allergic rhinitis     Asthma     COPD (chronic obstructive pulmonary disease) (HCC)     GERD (gastroesophageal reflux disease)     Hyperlipidemia with target LDL less than 100 8/29/2019    Hypertension     Pansinusitis     Primary osteoarthritis of both knees pe Xrays 8/30/2018     None otherwise stated in nurses notes    SURGICAL HISTORY       Past Surgical History:   Procedure Laterality Date    ANKLE FRACTURE SURGERY Right    [address]    bit by a dog in the right cheek     None otherwise stated in nurses notes    CURRENT MEDICATIONS       Previous Medications    ACETAMINOPHEN (TYLENOL) 500 MG TABLET    Take 1 tablet by mouth every 6 hours as needed for Pain    ALBUTEROL SULFATE  (90 BASE) MCG/ACT INHALER    INHALE TWO PUFFS BY MOUTH EVERY 6 HOURS AS NEEDED FOR WHEEZING OR SHORTNESS OF BREATH (COUGH) ALUMINUM CHLORIDE (DRYSOL) 20 % EXTERNAL SOLUTION    Apply topically nightly. AMLODIPINE (NORVASC) 10 MG TABLET    TAKE ONE TABLET BY MOUTH DAILY    ELASTIC BANDAGES & SUPPORTS (KNEE BRACE/HINGED BARS LARGE) MISC    Needs knee brace bilaterally, hinged. FLUTICASONE-SALMETEROL (ADVAIR DISKUS) 500-50 MCG/DOSE DISKUS INHALER    Inhale 1 puff into the lungs every 12 hours    IPRATROPIUM-ALBUTEROL (DUONEB) 0.5-2.5 (3) MG/3ML SOLN NEBULIZER SOLUTION    Take 3 mLs by nebulization every 6 hours as needed for Shortness of Breath or Other (dyspnea, wheezing)    NAPROXEN (NAPROSYN) 250 MG TABLET    Take 1 tablet by mouth 2 times daily as needed for Pain ONLY AS NEEDED, CAN CAUSE GI BLEED, ULCERS AND KIDNEY FAILURE IF TAKING IT EVERY DAY    PANTOPRAZOLE (PROTONIX) 40 MG TABLET    Take 1 tablet by mouth daily       ALLERGIES     Aspirin, Pcn [penicillins], Bactrim [sulfamethoxazole-trimethoprim], and Doxycycline    FAMILY HISTORY           Problem Relation Age of Onset    Allergies Daughter     Depression Daughter     Colon Cancer Neg Hx     Prostate Cancer Neg Hx      Family Status   Relation Name Status    Aaron  (Not Specified)    Neg Hx  (Not Specified)      None otherwise stated in nurses notes    SOCIAL HISTORY      reports that he has been smoking cigarettes. He has a 30.00 pack-year smoking history. He has never used smokeless tobacco. He reports current alcohol use of about 12.0 standard drinks of alcohol per week. He reports that he does not use drugs. lives at home with others     PHYSICAL EXAM    (up to 7 for level 4, 8 or more for level 5)     ED Triage Vitals [12/28/21 1340]   BP Temp Temp Source Pulse Resp SpO2 Height Weight   138/84 98.5 °F (36.9 °C) Temporal 101 18 97 % -- 197 lb (89.4 kg)       Physical Exam   Nursing note and vitals reviewed.   Constitutional: well-developed, well-nourished, nontoxic, well appearing, not distressed  HEENT:  normocephalic atraumatic, external ears normal appearance, no nasal deformity, no neck masses or edema, patient protecting airway, no stridor, phonating well  Eyes: pupils equal, sclera non-icteric, no discharge  Cardiovascular: no JVD  Respiratory: non-labored breathing, effort normal, no accessory muscle use visulized, no audible wheezing  Gastrointestinal: Abdomen not distended  Musculoskeletal: moves extremities without impaired range of motion, no deformity, no edema  Skin: no pallor, no rashes visible  Neuro: alert and oriented times 3, GCS 15, normal coordination, no dysarthria or aphasia  Psych: normal mood and affect, cooperative, normal thought content            DIAGNOSTIC RESULTS     EKG: All EKG's are interpreted by the Emergency Department Physician who either signs or Co-signs this chart in the absence of a cardiologist.        RADIOLOGY:   All plain film, CT, MRI, and formal ultrasound images (except ED bedside ultrasound) are read by the radiologist, see reports below, unless otherwise noted in MDM or here. XR CHEST PORTABLE   Final Result   No acute process. No results found. LABS:  Labs Reviewed   COVID-19, RAPID - Abnormal; Notable for the following components:       Result Value    SARS-CoV-2, Rapid DETECTED (*)     All other components within normal limits       All other labs were within normal range or not returned as of this dictation.     EMERGENCY DEPARTMENT COURSE and DIFFERENTIAL DIAGNOSIS/MDM:   Vitals:    Vitals:    12/28/21 1340 12/28/21 1522   BP: 138/84    Pulse: 101 97   Resp: 18    Temp: 98.5 °F (36.9 °C)    TempSrc: Temporal    SpO2: 97%    Weight: 197 lb (89.4 kg)          Patient instructed to return to the emergency room if symptoms worsen, return, or any other concern right away which is agreed by the patient    ED MEDS:  Orders Placed This Encounter   Medications    predniSONE (DELTASONE) 20 MG tablet     Sig: Take 2 tablets by mouth daily for 5 days     Dispense:  10 tablet     Refill:  0 CONSULTS:  None    PROCEDURES:  None      FINAL IMPRESSION      1. COVID-19          DISPOSITION/PLAN   DISPOSITION     PATIENT REFERRED TO:  Jonathan Stiles MD  [address] [facility] [address]  [facility] [address] [phone]          DISCHARGE MEDICATIONS:  New Prescriptions    PREDNISONE (DELTASONE) 20 MG TABLET    Take 2 tablets by mouth daily for 5 days         Summation      Patient Course:      Sinus headache/ congestion, body aches, chills x 1 week. No cough, chest pain or sob. Covid is positive. Chest xray unremarkable. Pt does have asthma. Will start on prednisone. Pt is interested in regeneron infusion. Will contact pharmacy. Infusion appointments are booking out 1 week. Pt does not meet the criteria. Work note provided. Recommend OTC medications. Discussed results and plan with the pt. They expressed appropriate understanding. Pt given close follow up, supportive care instructions and strict return instructions at the bedside. The care is provided during an unprecedented national emergency due to the novel coronavirus, COVID-19. ED Medications administered this visit:  Medications - No data to display    New Prescriptions from this visit:    New Prescriptions    PREDNISONE (DELTASONE) 20 MG TABLET    Take 2 tablets by mouth daily for 5 days       Follow-up:  Jonathan Stiles MD  [address]  [address]  [address] [facility] [address]  [address] [facility] [address] [phone]            Final Impression:   1.  COVID-19               (Please note that portions of this note were completed with a voice recognition program )        MIKAEL Pappas PA-C  12/28/21 [address] [facility] Marcus Salas PA-C  12/28/21 4985

## 2021-12-29 ENCOUNTER — CARE COORDINATION (OUTPATIENT)
Dept: CARE COORDINATION | Age: 56
End: 2021-12-29

## 2021-12-29 ENCOUNTER — TELEPHONE (OUTPATIENT)
Dept: FAMILY MEDICINE CLINIC | Age: 56
End: 2021-12-29

## 2021-12-29 NOTE — ED PROVIDER NOTES
eMERGENCY dEPARTMENT eNCOUnter   Independent Attestation     Pt Name: Meagan Saucedo  MRN: [id number]  [place] 1965  Date of evaluation: 12/28/21     Meagan Saucedo is a 55 y.o. male with CC: Sinusitis      Based on the medical record the care appears appropriate. I was personally available for consultation in the Emergency Department. The care is provided during an unprecedented national emergency due to the novel coronavirus, COVID 19.     Karyle Silvas, MD  Attending Emergency Physician                    Joselyn Hernandez MD  12/28/21 2053

## 2021-12-29 NOTE — CARE COORDINATION
Patient contacted regarding COVID-19 diagnosis. Discussed COVID-19 related testing which was available at this time. Test results were positive. Patient informed of results, if available? Yes. Ambulatory Care Manager contacted the patient by telephone to perform post discharge assessment. Call within 2 business days of discharge: Yes. Verified name and  with patient as identifiers. Provided introduction to self, and explanation of the CTN/ACM role, and reason for call due to risk factors for infection and/or exposure to COVID-19. Symptoms reviewed with patient who verbalized the following symptoms: pain or aching joints, chills or shaking and Congestion and sinus headache. Due to no new or worsening symptoms encounter was not routed to provider for escalation. Discussed follow-up appointments. If no appointment was previously scheduled, appointment scheduling offered: No.  [facility] follow up appointment(s):   Future Appointments   Date Time Provider Sha Porter   2022  3:45 PM Ike Gomez MD [facility]     Non-[facility] follow up appointment(s):     Non-face-to-face services provided:     Advance Care Planning:   Does patient have an Advance Directive:  reviewed and current. Educated patient about risk for severe COVID-19 due to risk factors according to CDC guidelines. ACM reviewed discharge instructions, medical action plan and red flag symptoms with the patient who verbalized understanding. Discussed COVID vaccination status: Yes. Education provided on COVID-19 vaccination as appropriate. Discussed exposure protocols and quarantine with CDC Guidelines. Patient was given an opportunity to verbalize any questions and concerns and agrees to contact ACM or health care provider for questions related to their healthcare. Reviewed and educated patient on any new and changed medications related to discharge diagnosis     Was patient discharged with a pulse oximeter?  Yes Discussed and confirmed pulse oximeter discharge instructions and when to notify provider or seek emergency care. [facility] provided contact information. Will follow up with patient in 5-7 days based on severity of symptoms and risk factors. Steps to help prevent the spread of COVID-19 if you are sick  SOURCE - [email or web address].[email or web address] html     Stay home except to get medical care   ; Stay home: People who are mildly ill with COVID-19 are able to isolate at home during their illness. You should restrict activities outside your home, except for getting medical care.   ; Avoid public areas: Do not go to work, school, or public areas.   ; Avoid public transportation: Avoid using public transportation, ride-sharing, or taxis.  ; Separate yourself from other people and animals in your home   ; Stay away from others: As much as possible, you should stay in a specific room and away from other people in your home. Also, you should use a separate bathroom, if available.   ; Limit contact with pets & animals: You should restrict contact with pets and other animals while you are sick with COVID-19, just like you would around other people. Although there have not been reports of pets or other animals becoming sick with COVID-19, it is still recommended that people sick with COVID-19 limit contact with animals until more information is known about the virus. ; When possible, have another member of your household care for your animals while you are sick. If you are sick with COVID-19, avoid contact with your pet, including petting, snuggling, being kissed or licked, and sharing food. If you must care for your pet or be around animals while you are sick, wash your hands before and after you interact with pets and wear a facemask. See COVID-19 and Animals for more information. Other considerations   The ill person should eat/be fed in their room if possible.  Non-disposable  items are not readily available, use an alcohol-based hand  with at least 60% alcohol, covering all surfaces of your hands and rubbing them together until they feel dry.   ; Soap and water: Soap and water are the best option if hands are visibly dirty.   ; Avoid touching: Avoid touching your eyes, nose, and mouth with unwashed hands. Handwashing Tips   ; Wet your hands with clean, running water (warm or cold), turn off the tap, and apply soap.  ; Lather your hands by rubbing them together with the soap. Lather the backs of your hands, between your fingers, and under your nails. ; Scrub your hands for at least 20 seconds. Need a timer? Hum [place] from beginning to end twice.  ; Rinse your hands well under clean, running water.  ; Dry your hands using a clean towel or air dry them. Avoid sharing personal household items   ; Do not share: You should not share dishes, drinking glasses, cups, eating utensils, towels, or bedding with other people or pets in your home.   ; Wash thoroughly after use: After using these items, they should be washed thoroughly with soap and water. Clean all high-touch surfaces everyday   ; Clean and disinfect: Practice routine cleaning of high touch surfaces.  ; High touch surfaces include counters, tabletops, doorknobs, bathroom fixtures, toilets, phones, keyboards, tablets, and bedside tables.  ; Disinfect areas with bodily fluids: Also, clean any surfaces that may have blood, stool, or body fluids on them.   ; Household : Use a household cleaning spray or wipe, according to the label instructions. Labels contain instructions for safe and effective use of the cleaning product including precautions you should take when applying the product, such as wearing gloves and making sure you have good ventilation during use of the product.     Monitor your symptoms   Seek medical attention: Seek prompt medical attention if your illness is worsening     (e.g., difficulty breathing).   ; Call your doctor: Before seeking care, call your healthcare provider and tell them that you have, or are being evaluated for, COVID-19.   ; Wear a facemask when sick: Put on a facemask before you enter the facility. These steps will help the healthcare provider's office to keep other people in the office or waiting room from getting infected or exposed. ; Alert health department: Ask your healthcare provider to call the local or state health department. Persons who are placed under active monitoring or facilitated self-monitoring should follow instructions provided by their local health department or occupational health professionals, as appropriate.  ; Call 911 if you have a medical emergency: If you have a medical emergency and need to call 911, notify the dispatch personnel that you have, or are being evaluated for COVID-19. If possible, put on a facemask before emergency medical services arrive.

## 2022-01-07 ENCOUNTER — TELEMEDICINE (OUTPATIENT)
Dept: FAMILY MEDICINE CLINIC | Age: 57
End: 2022-01-07
Payer: COMMERCIAL

## 2022-01-07 DIAGNOSIS — J45.901 ASTHMA EXACERBATION IN COPD (HCC): Primary | ICD-10-CM

## 2022-01-07 DIAGNOSIS — J44.1 ASTHMA EXACERBATION IN COPD (HCC): Primary | ICD-10-CM

## 2022-01-07 DIAGNOSIS — J20.9 ACUTE BRONCHITIS DUE TO INFECTION: ICD-10-CM

## 2022-01-07 DIAGNOSIS — J01.90 ACUTE BACTERIAL SINUSITIS: ICD-10-CM

## 2022-01-07 DIAGNOSIS — B96.89 ACUTE BACTERIAL SINUSITIS: ICD-10-CM

## 2022-01-07 DIAGNOSIS — U07.1 COVID-19 VIRUS INFECTION: ICD-10-CM

## 2022-01-07 PROCEDURE — 99214 OFFICE O/P EST MOD 30 MIN: CPT | Performed by: FAMILY MEDICINE

## 2022-01-07 RX ORDER — ALBUTEROL SULFATE 90 UG/1
AEROSOL, METERED RESPIRATORY (INHALATION)
Qty: 18 G | Refills: 2 | Status: SHIPPED | OUTPATIENT
Start: 2022-01-07 | End: 2022-09-30 | Stop reason: SDUPTHER

## 2022-01-07 RX ORDER — PREDNISONE 10 MG/1
50 TABLET ORAL DAILY
Qty: 35 TABLET | Refills: 0 | Status: SHIPPED | OUTPATIENT
Start: 2022-01-07 | End: 2022-01-14

## 2022-01-07 RX ORDER — IPRATROPIUM BROMIDE AND ALBUTEROL SULFATE 2.5; .5 MG/3ML; MG/3ML
1 SOLUTION RESPIRATORY (INHALATION) EVERY 6 HOURS PRN
Qty: 360 ML | Refills: 3 | Status: SHIPPED | OUTPATIENT
Start: 2022-01-07

## 2022-01-07 RX ORDER — AZITHROMYCIN 250 MG/1
TABLET, FILM COATED ORAL
Qty: 6 TABLET | Refills: 0 | Status: SHIPPED | OUTPATIENT
Start: 2022-01-07 | End: 2022-01-12

## 2022-01-07 ASSESSMENT — PATIENT HEALTH QUESTIONNAIRE - PHQ9
SUM OF ALL RESPONSES TO PHQ QUESTIONS 1-9: 0
2. FEELING DOWN, DEPRESSED OR HOPELESS: 0
SUM OF ALL RESPONSES TO PHQ QUESTIONS 1-9: 0
SUM OF ALL RESPONSES TO PHQ QUESTIONS 1-9: 0
SUM OF ALL RESPONSES TO PHQ9 QUESTIONS 1 & 2: 0
1. LITTLE INTEREST OR PLEASURE IN DOING THINGS: 0
SUM OF ALL RESPONSES TO PHQ QUESTIONS 1-9: 0

## 2022-01-07 NOTE — PROGRESS NOTES
[address] [place] [address]    Mobile Phone      Send Link Via Text    No text has been sent  Last text sent2022 4:29 PM  To:[phone]          Anastacio contacted provider via  telephone encounter from 2022. Patient requested office visit, was scheduled for virtual visit phone call , [address] was unable to use doxy. me or [employer]. Harper Moctezuma is a 55 y.o. male evaluated via telephone on  22    [facility] Jose Elias (:  1965) is a 55 y.o. male,Established patient, here for evaluation of the following chief complaint(s): Concern For COVID-19 and ED Follow-up      ASSESSMENT/PLAN:    1. Asthma exacerbation in COPD ([facility] [address]  worsening    -     predniSONE (DELTASONE) 10 MG tablet; Take 5 tablets by mouth daily for 7 days, Disp-35 tablet, R-0Normal  -     fluticasone-salmeterol (ADVAIR DISKUS) 500-50 MCG/DOSE diskus inhaler; Inhale 1 puff into the lungs every 12 hours, Disp-60 each, R-5Normal  -     albuterol sulfate  (90 Base) MCG/ACT inhaler; INHALE TWO PUFFS BY MOUTH EVERY 6 HOURS AS NEEDED FOR WHEEZING OR SHORTNESS OF BREATH (COUGH), Disp-18 g, R-2Normal    - START    ipratropium-albuterol (DUONEB) 0.5-2.5 (3) MG/3ML SOLN nebulizer solution; Take 3 mLs by nebulization every 6 hours as needed for Shortness of Breath or Other (dyspnea, wheezing), Disp-360 mL, R-3Normal  Continue to abstain from smoking    2. Acute bronchitis due to infection  worsening    - start    azithromycin (ZITHROMAX) 250 MG tablet; 500 mg orally on day one followed by 250 mg daily on days two through five, Disp-6 tablet, R-0Normal  - start    predniSONE (DELTASONE) 10 MG tablet; Take 5 tablets by mouth daily for 7 days, Disp-35 tablet, R-0Normal  Due to wheezing, he needs a second round of steroids    Continue to abstain from smoking  Start nebulizer treatments every 6 hours  3.  Acute bacterial sinusitis  worsening   -  start   azithromycin (ZITHROMAX) 250 MG tablet; 500 mg orally on day one followed by 250 mg daily on days two through five, Disp-6 tablet, R-0Normal  -     predniSONE (DELTASONE) 10 MG tablet; Take 5 tablets by mouth daily for 7 days, Disp-35 tablet, R-0Normal  Continue to abstain from smoking    4. COVID-19 virus infection  -     predniSONE (DELTASONE) 10 MG tablet; Take 5 tablets by mouth daily for 7 days, Disp-35 tablet, R-0Normal    Advised to continue to abstain from smoking, did not smoke since December 28, 2021. Advised to start using the nebulizer treatment    HAS RECEIVED THE COVID 19 VACCINES, he believes he got COVID-19 from the vaccine received on 12/26/2021   I explained to him it is a coincidence, a vaccine cannot give him the disease, and that he did not have enough protection because it takes 2 weeks for the vaccine to start working    Immunization History   Administered Date(s) Administered    COVID-19, Douglas Peter, PF, 30mcg/0.3mL 06/01/2021, 06/22/2021, 12/26/2021    Influenza 10/04/2013    Influenza Virus Vaccine 09/24/2012, 10/04/2013, 02/18/2015    Influenza, Quadv, Recombinant, IM PF (Flublok 18 yrs and older) 10/14/2020, 11/03/2021    Pneumococcal Conjugate 13-valent ([id number]) 02/18/2015    Pneumococcal Polysaccharide ([id number]) 05/12/2017    Tdap (Boostrix, Adacel) 08/18/2021    Zoster Recombinant (Shingrix) 11/03/2021         Anastacio received counseling on the following healthy behaviors: nutrition, exercise, medication adherence, tobacco cessation and weight loss. Reviewed prior labs and health maintenance  Discussed use, benefit, and side effects of prescribed medications. Barriers to medication compliance addressed. Patient given educational materials - see patient instructions  Was a self-tracking handout given in paper form or via [employer]? No  All patient questions answered. Patient voiced understanding.   The patient's past medical,surgical, social, and family history as well as his current medications and allergies were reviewed as documented in today's encounter. Medications, labs, diagnostic studies, consultations and follow-up as documented in this encounter. Return in about 3 months (around 4/7/2022) for Face-2F-30mins PHYSICAL, VISION screen, PHQ9. .    Data Unavailable      No future appointments. Consent:  He is aware that that he may receive a bill for this telephone service, depending on his insurance coverage, and has provided verbal consent to proceed: Yes      Documentation and HPI:  I communicated with the patient about: Concern For COVID-19 and ED Follow-up  Based on the notes from emergency room, his symptoms started 1 week prior to emergency room visit, approximately 12/21/2021. He was treated with prednisone 40 mg for 5 days. Patient did not meet the criteria for Regeneron infusion. Chest x-ray done 12/28/2021 was negative for pneumonia. Rudy Rodriguez reports currently having Sinus infection, dyspnea on exertion and lungs feel full, also some wheezing. He has returned back to work,. Patient reports sinus pressure over the maxillary and frontal sinuses, postnasal drip, runny nose with yellow and green mucus, productive cough of yellow and green mucus, shortness of breath with exertion. Denies fever, chills, night sweats. He denies chest pain. He reports he has not been using his nebulizer treatments. He has been using his inhalers. Patient has known COPD and asthma and  Smoking.   He tells me that he did quit smoking since 12/28/2021 when he went to emergency room and she was told that he has Covid  Praise given, continue to abstain from smoking  He denies cravings    Blood pressure borderline high in the emergency room  BP Readings from Last 3 Encounters:   12/28/21 138/84   08/18/21 130/80   03/24/21 138/88     Pulse ox normal  SpO2 Readings from Last 4 Encounters:   12/28/21 97%   08/18/21 98%   03/24/21 97%   11/13/20 95%   Negative depression screening      PHQ Scores 1/7/2022 3/24/2021 11/13/2020 1/15/2020 8/29/2019 4/30/2019 2018   PHQ2 Score 0 0 0 0 0 0 0   PHQ9 Score 0 0 0 0 0 0 0   The patient's past medical, surgical, social, and family history as well as his current medications and allergies were reviewed as documented in today's encounter. Prior to Visit Medications    Medication Sig Taking? Authorizing Provider   aluminum chloride (DRYSOL) 20 % external solution Apply topically nightly. Yes Lynn Splinter, DPM   fluticasone-salmeterol (ADVAIR DISKUS) 500-50 MCG/DOSE diskus inhaler Inhale 1 puff into the lungs every 12 hours Yes Brianne Jacinto MD   naproxen (NAPROSYN) 250 MG tablet Take 1 tablet by mouth 2 times daily as needed for Pain ONLY AS NEEDED, CAN CAUSE GI BLEED, ULCERS AND KIDNEY FAILURE IF TAKING IT EVERY DAY Yes Brianne Jacinto MD   pantoprazole (PROTONIX) 40 MG tablet Take 1 tablet by mouth daily Yes Brianne Jacinto MD   albuterol sulfate  (90 Base) MCG/ACT inhaler INHALE TWO PUFFS BY MOUTH EVERY 6 HOURS AS NEEDED FOR WHEEZING OR SHORTNESS OF BREATH (COUGH) Yes Brianne Jacinto MD   amLODIPine (NORVASC) 10 MG tablet TAKE ONE TABLET BY MOUTH DAILY Yes Brianne Jacinto MD   ipratropium-albuterol (DUONEB) 0.5-2.5 (3) MG/3ML SOLN nebulizer solution Take 3 mLs by nebulization every 6 hours as needed for Shortness of Breath or Other (dyspnea, wheezing) Yes Brianne Jacinto MD   acetaminophen (TYLENOL) 500 MG tablet Take 1 tablet by mouth every 6 hours as needed for Pain Yes Brianne Jacinto MD   Elastic Bandages & Supports (KNEE BRACE/HINGED BARS LARGE) MISC Needs knee brace bilaterally, hinged.  Yes Brianne Jacinto MD       -vital signs stable and within normal limits except overweight per BMI, BMI 29.09 kg/M2  Patient-Reported Vitals 2022   Patient-Reported Weight 197 pounds   Patient-Reported Height 5 foot 9 inches          Orders Placed This Encounter   Medications    azithromycin (ZITHROMAX) 250 MG tablet     Si mg orally on day one followed by 250 mg daily on days two through five     Dispense:  6 tablet     Refill:  0    predniSONE (DELTASONE) 10 MG tablet     Sig: Take 5 tablets by mouth daily for 7 days     Dispense:  35 tablet     Refill:  0    fluticasone-salmeterol (ADVAIR DISKUS) 500-50 MCG/DOSE diskus inhaler     Sig: Inhale 1 puff into the lungs every 12 hours     Dispense:  60 each     Refill:  5    albuterol sulfate  (90 Base) MCG/ACT inhaler     Sig: INHALE TWO PUFFS BY MOUTH EVERY 6 HOURS AS NEEDED FOR WHEEZING OR SHORTNESS OF BREATH (COUGH)     Dispense:  18 g     Refill:  2    ipratropium-albuterol (DUONEB) 0.5-2.5 (3) MG/3ML SOLN nebulizer solution     Sig: Take 3 mLs by nebulization every 6 hours as needed for Shortness of Breath or Other (dyspnea, wheezing)     Dispense:  360 mL     Refill:  3       No orders of the defined types were placed in this encounter. Medications Discontinued During This Encounter   Medication Reason    ipratropium-albuterol (DUONEB) 0.5-2.5 (3) MG/3ML SOLN nebulizer solution REORDER    albuterol sulfate  (90 Base) MCG/ACT inhaler REORDER    fluticasone-salmeterol (ADVAIR DISKUS) 500-50 MCG/DOSE diskus inhaler REORDER       I affirm this is a Patient Initiated Episode with an Established Patient who has not had a related appointment within my department in the past 7 days or scheduled within the next 24 hours. Patient location's was at home, and provider's location was at the primary practice location. Patient identification was verified at the start of the visit: Yes    On this date 1/7/2022 I have spent  30 minutes reviewing previous notes, test results and face to face with the patient discussing the diagnosis and importance of compliance with the treatment plan as well as documenting on the day of the visit. The patient (or guardian if applicable) is aware that this is a billable service. Verbal consent to proceed has been obtained within the past 12 months.    The visit was conducted pursuant to the emergency declaration under the [facility], [address] [place] [address] authority and the [employer] and [employer] General Act. The patient was located in a state where the provider was credentialed to provide care. An electronic signature was used to authenticate this note.   Electronically signed by Brianne Jacinto MD on 1/8/2022  at 5:45 PM

## 2022-01-08 ENCOUNTER — TELEPHONE (OUTPATIENT)
Dept: FAMILY MEDICINE CLINIC | Age: 57
End: 2022-01-08

## 2022-01-08 PROBLEM — U07.1 COVID-19 VIRUS INFECTION: Status: ACTIVE | Noted: 2022-01-08

## 2022-01-08 NOTE — TELEPHONE ENCOUNTER
Return in about 3 months (around 4/7/2022) for Face-2F-30mins PHYSICAL, VISION screen, PHQ9.  OK TO DO A  Tuesday 1 PM OR Thursday 8 AM, 30 MINS

## 2022-02-11 ENCOUNTER — CLINICAL DOCUMENTATION (OUTPATIENT)
Dept: FAMILY MEDICINE CLINIC | Age: 57
End: 2022-02-11

## 2022-02-14 ENCOUNTER — TELEPHONE (OUTPATIENT)
Dept: FAMILY MEDICINE CLINIC | Age: 57
End: 2022-02-14

## 2022-03-02 DIAGNOSIS — L74.513 HYPERHIDROSIS OF SOLES: ICD-10-CM

## 2022-03-02 NOTE — TELEPHONE ENCOUNTER
Please Approve or Refuse.        Next Visit Date:  Visit date not found   Last Visit Date: 1/20/2020    Hemoglobin A1C (%)   Date Value   03/24/2021 4.8             ( goal A1C is < 7)   BP Readings from Last 3 Encounters:   12/28/21 138/84   08/18/21 130/80   03/24/21 138/88          (goal 120/80)  BUN   Date Value Ref Range Status   09/15/2021 21 (H) 6 - 20 mg/dL Final     CREATININE   Date Value Ref Range Status   09/15/2021 0.91 0.70 - 1.20 mg/dL Final     Potassium   Date Value Ref Range Status   09/15/2021 4.1 3.7 - 5.3 mmol/L Final

## 2022-05-05 DIAGNOSIS — I10 ESSENTIAL HYPERTENSION: ICD-10-CM

## 2022-05-05 RX ORDER — AMLODIPINE BESYLATE 10 MG/1
TABLET ORAL
Qty: 90 TABLET | Refills: 2 | Status: SHIPPED | OUTPATIENT
Start: 2022-05-05 | End: 2022-09-30 | Stop reason: SDUPTHER

## 2022-05-05 NOTE — TELEPHONE ENCOUNTER
Please Approve or Refuse.   Send to Pharmacy per Pt's Request:      Next Visit Date:  6/23/2022   Last Visit Date: 1/7/2022    Hemoglobin A1C (%)   Date Value   03/24/2021 4.8             ( goal A1C is < 7)   BP Readings from Last 3 Encounters:   12/28/21 138/84   08/18/21 130/80   03/24/21 138/88          (goal 120/80)  BUN   Date Value Ref Range Status   09/15/2021 21 (H) 6 - 20 mg/dL Final     CREATININE   Date Value Ref Range Status   09/15/2021 0.91 0.70 - 1.20 mg/dL Final     Potassium   Date Value Ref Range Status   09/15/2021 4.1 3.7 - 5.3 mmol/L Final

## 2022-06-24 ENCOUNTER — TELEPHONE (OUTPATIENT)
Dept: FAMILY MEDICINE CLINIC | Age: 57
End: 2022-06-24

## 2022-06-24 NOTE — TELEPHONE ENCOUNTER
----- Message from Odette Hirsch sent at 6/22/2022  3:52 PM EDT -----  Subject: Appointment Request    Reason for Call: Routine Well Child    QUESTIONS  Type of Appointment? Established Patient  Reason for appointment request? Available appointments did not meet   patient need  Additional Information for Provider? Face-2F-30mins PHYSICAL, VISION   screen, PHQ9. Needs a morning appointment. Screened green. ---------------------------------------------------------------------------  --------------  [place] INFO  What is the best way for the office to contact you? OK to leave message on   voicemail  Preferred Call Back Phone Number? [phone]  ---------------------------------------------------------------------------  --------------  SCRIPT ANSWERS  Relationship to Patient? Other  Representative Name? Marci Cousins  Additional information verified (besides Name and Date of Birth)? Address  (Is the patient/parent requesting an urgent appointment?)? No  Is the child less than three years old? No  Has the child had a well child visit within the last year? (or it is   unknown when last well child was)? No  Have you been diagnosed with COVID-19 in the past 10 days? No  (Service Expert  click yes below to proceed with [facility] [employer] As Usual   Scheduling)?  Yes

## 2022-06-24 NOTE — TELEPHONE ENCOUNTER
ATTEMPTED TO CALL PT TO SCHEDULE  Face-2F-30mins PHYSICAL, VISION   screen, PHQ9  IN OFFICE, NO ANSWER SO LVM.

## 2022-07-04 ENCOUNTER — HOSPITAL ENCOUNTER (EMERGENCY)
Age: 57
Discharge: HOME | End: 2022-07-04
Attending: EMERGENCY MEDICINE
Payer: COMMERCIAL

## 2022-07-04 ENCOUNTER — APPOINTMENT (OUTPATIENT)
Dept: GENERAL RADIOLOGY | Age: 57
End: 2022-07-04
Payer: COMMERCIAL

## 2022-07-04 ENCOUNTER — APPOINTMENT (OUTPATIENT)
Dept: CT IMAGING | Age: 57
End: 2022-07-04
Payer: COMMERCIAL

## 2022-07-04 VITALS
TEMPERATURE: 98.2 F | HEIGHT: 71 IN | DIASTOLIC BLOOD PRESSURE: 85 MMHG | RESPIRATION RATE: 16 BRPM | HEART RATE: 92 BPM | WEIGHT: 210 LBS | SYSTOLIC BLOOD PRESSURE: 154 MMHG | BODY MASS INDEX: 29.4 KG/M2 | OXYGEN SATURATION: 97 %

## 2022-07-04 DIAGNOSIS — M54.31 SCIATICA OF RIGHT SIDE: Primary | ICD-10-CM

## 2022-07-04 PROBLEM — M16.0 PRIMARY OSTEOARTHRITIS OF BOTH HIPS: Status: ACTIVE | Noted: 2022-07-04

## 2022-07-04 PROBLEM — M51.36 DDD (DEGENERATIVE DISC DISEASE), LUMBAR: Status: ACTIVE | Noted: 2022-07-04

## 2022-07-04 PROBLEM — M51.369 DDD (DEGENERATIVE DISC DISEASE), LUMBAR: Status: ACTIVE | Noted: 2022-07-04

## 2022-07-04 PROCEDURE — 99284 EMERGENCY DEPT VISIT MOD MDM: CPT

## 2022-07-04 PROCEDURE — 6370000000 HC RX 637 (ALT 250 FOR IP): Performed by: EMERGENCY MEDICINE

## 2022-07-04 PROCEDURE — 74176 CT ABD & PELVIS W/O CONTRAST: CPT

## 2022-07-04 RX ORDER — PREDNISONE 10 MG/1
TABLET ORAL
Qty: 20 TABLET | Refills: 0 | Status: SHIPPED | OUTPATIENT
Start: 2022-07-05 | End: 2022-07-09

## 2022-07-04 RX ORDER — PREDNISONE 20 MG/1
50 TABLET ORAL ONCE
Status: COMPLETED | OUTPATIENT
Start: 2022-07-04 | End: 2022-07-04

## 2022-07-04 RX ORDER — CYCLOBENZAPRINE HCL 10 MG
10 TABLET ORAL NIGHTLY PRN
Qty: 10 TABLET | Refills: 0 | Status: SHIPPED | OUTPATIENT
Start: 2022-07-04 | End: 2022-07-14

## 2022-07-04 RX ADMIN — PREDNISONE 50 MG: 20 TABLET ORAL at 16:08

## 2022-07-04 ASSESSMENT — LIFESTYLE VARIABLES
HOW OFTEN DO YOU HAVE A DRINK CONTAINING ALCOHOL: 4 OR MORE TIMES A WEEK
HOW MANY STANDARD DRINKS CONTAINING ALCOHOL DO YOU HAVE ON A TYPICAL DAY: 5 OR 6
HOW OFTEN DO YOU HAVE A DRINK CONTAINING ALCOHOL: 4 OR MORE TIMES A WEEK
HOW MANY STANDARD DRINKS CONTAINING ALCOHOL DO YOU HAVE ON A TYPICAL DAY: 5 OR 6

## 2022-07-04 ASSESSMENT — PAIN DESCRIPTION - ORIENTATION: ORIENTATION: RIGHT

## 2022-07-04 ASSESSMENT — PAIN - FUNCTIONAL ASSESSMENT: PAIN_FUNCTIONAL_ASSESSMENT: 0-10

## 2022-07-04 ASSESSMENT — PAIN SCALES - GENERAL: PAINLEVEL_OUTOF10: 10

## 2022-07-04 ASSESSMENT — PAIN DESCRIPTION - LOCATION: LOCATION: HIP

## 2022-07-04 NOTE — ED PROVIDER NOTES
[address]  EMERGENCY DEPARTMENT ENCOUNTER      Pt Name: April Lal  MRN: [id number]  [place] 1965  Date of evaluation: 7/4/22      CHIEF COMPLAINT       Chief Complaint   Patient presents with    Hip Pain     right      HISTORY OF PRESENT ILLNESS   55 y.o. male presents with c/o low back pain. Symptoms started last night around 9:30pm.  Pt reports that he bent over to get something out of his fridge, felt a pop in his back and then started having bad pain in his right low back radiating down into his right leg. Pain is described as sharp in character, severe in severity (rating it 10 / 10). The pain has been constant in course and worsened with position changes. The patient tried extra strength tylenol prior to arrival with no relief of symptoms. The patient denies a history of similar symptoms. The patient denies any fevers, chills, weight loss, bowel/bladder incontinence, lower extremity weakness, or midline tenderness. Pain also radiates to his right groin and he is concerned that he might have a hernia there. REVIEW OF SYSTEMS       Review of Systems   Constitutional: Negative for fever. Respiratory: Negative for cough. Gastrointestinal: Positive for abdominal pain. Negative for nausea and vomiting. Genitourinary: Negative for difficulty urinating. Musculoskeletal: Positive for arthralgias and back pain. Skin: Negative for rash and wound. Neurological: Positive for numbness. Negative for weakness.        PAST MEDICAL HISTORY     Past Medical History:   Diagnosis Date    Alcohol abuse     Allergic rhinitis     Asthma     COPD (chronic obstructive pulmonary disease) ([facility] [address]     DDD (degenerative disc disease), lumbar 7/4/2022    Seen on CT abdomen    GERD (gastroesophageal reflux disease)     Hyperlipidemia with target LDL less than 100 8/29/2019    Hypertension     Pansinusitis     Primary osteoarthritis of both hips 7/4/2022    Seen on CT abdomen and pelvis  Primary osteoarthritis of both knees pe Xrays 8/30/2018       SURGICAL HISTORY       Past Surgical History:   Procedure Laterality Date    ANKLE FRACTURE SURGERY Right     FACIAL COSMETIC SURGERY  1978    bit by a dog in the right cheek       CURRENT MEDICATIONS       Discharge Medication List as of 7/4/2022  4:58 PM      CONTINUE these medications which have NOT CHANGED    Details   amLODIPine (NORVASC) 10 MG tablet TAKE ONE TABLET BY MOUTH DAILY, Disp-90 tablet, R-2Normal      aluminum chloride (DRYSOL) 20 % external solution APPLY TOPICALLY NIGHTLY, Disp-60 mL, R-5, Normal      fluticasone-salmeterol (ADVAIR DISKUS) 500-50 MCG/DOSE diskus inhaler Inhale 1 puff into the lungs every 12 hours, Disp-60 each, R-5Normal      albuterol sulfate  (90 Base) MCG/ACT inhaler INHALE TWO PUFFS BY MOUTH EVERY 6 HOURS AS NEEDED FOR WHEEZING OR SHORTNESS OF BREATH (COUGH), Disp-18 g, R-2Normal      ipratropium-albuterol (DUONEB) 0.5-2.5 (3) MG/3ML SOLN nebulizer solution Take 3 mLs by nebulization every 6 hours as needed for Shortness of Breath or Other (dyspnea, wheezing), Disp-360 mL, R-3Normal      naproxen (NAPROSYN) 250 MG tablet Take 1 tablet by mouth 2 times daily as needed for Pain ONLY AS NEEDED, CAN CAUSE GI BLEED, ULCERS AND KIDNEY FAILURE IF TAKING IT EVERY DAY, Disp-180 tablet, R-0Normal      pantoprazole (PROTONIX) 40 MG tablet Take 1 tablet by mouth daily, Disp-90 tablet, R-3Normal      acetaminophen (TYLENOL) 500 MG tablet Take 1 tablet by mouth every 6 hours as needed for Pain, Disp-120 tablet, R-3Normal      Elastic Bandages & Supports (KNEE BRACE/HINGED BARS LARGE) MISC Disp-2 each, R-3, PrintNeeds knee brace bilaterally, hinged. ALLERGIES     is allergic to aspirin, pcn [penicillins], bactrim [sulfamethoxazole-trimethoprim], and doxycycline. FAMILY HISTORY     He indicated that the status of his daughter is unknown. He indicated that the status of his neg hx is unknown.        SOCIAL HISTORY      reports that he has been smoking cigarettes. He has a 30.00 pack-year smoking history. He has never used smokeless tobacco. He reports current alcohol use of about 12.0 standard drinks of alcohol per week. He reports that he does not use drugs. PHYSICAL EXAM     INITIAL VITALS: BP (!) 154/85   Pulse 92   Temp 98.2 °F (36.8 °C) (Oral)   Resp 16   Ht 5' 11\" (1.803 m)   Wt 210 lb (95.3 kg)   SpO2 97%   BMI 29.29 kg/m²   Gen: Appears uncomfortable  Head: Normocephalic, atraumatic  Eye: Pupils equal round reactive to light, no conjunctivitis  Heart: Regular rate and rhythm no murmurs  Lungs: Clear to auscultation bilaterally, no respiratory distress  Chest wall: No crepitus, no tenderness palpation  Abdomen: Soft, nontender, nondistended, with no peritoneal signs, no palpable pulsating mass. In the right inguinal area there is some bulging but I do not appreciate any worsening with Valsalva. He may have a direct hernia but I do not see any sign of an indirect hernia. MSK: Pain over the right-sided SI joints. No midline lumbar lumbar TTP. Skin: No rask / sign of shingles. No ecchymosis. Neurologic: Patient is alert and oriented x3, motor and sensation is intact in all 4 extremities,   Lower extremity strength bilaterally:   Hip Flexor / iliopsoas (L2): 5/5  Knee Extensors / quadriceps (L3): 5/5  Ankle dorsiflexors / tibialis anterior (L4): 5/5  Long toe extensors  / EHL (L5): 5/5  Extremities: Full range of motion, no cyanosis, no edema, no signs of trauma, no tenderness to palpation. DP and PT pulses are palpable. The feet are warm to touch with appropriate capillary refill. MEDICAL DECISION MAKING:     MDM and Emergency Department course  55 y.o. male presenting with low back pain and groin pain.   Differential diagnosis includes muscular low back pain, sciatica, discitis, osteomyelitis, epidural abscess, kidney stone, aortic dissection, ruptured aortic aneurysm, retroperitoneal hemorrahge. Based on history and physical exam there is no sign of discitis, osteomyelitis, epidural abscess. A CT scan was obtained and ruled out any obstructing hernia or sign of a kidney stone. I am suspecting musculoskeletal pain. The patient is neurovascularly intact. We'll provided symptomatic treatment and appropriate outpatient follow-up. Patient is in agreement with the treatment plan. DIAGNOSTIC RESULTS     RADIOLOGY:All plain film, CT, MRI, and formal ultrasound images (except ED bedside ultrasound) are read by the radiologist and the images and interpretations are directly viewed by the emergency physician. CT ABDOMEN PELVIS WO CONTRAST Additional Contrast? None   Final Result   No acute findings. EMERGENCY DEPARTMENT COURSE:   Vitals:    Vitals:    07/04/22 1529 07/04/22 1537 07/04/22 1541   BP: (!) 154/85     Pulse: 92 92    Resp:   16   Temp: 98.2 °F (36.8 °C)     TempSrc: Oral     SpO2: 97%     Weight: 210 lb (95.3 kg)     Height: 5' 11\" (1.803 m)         The patient was given the following medications while in the emergency department:  Orders Placed This Encounter   Medications    predniSONE (DELTASONE) tablet 50 mg    cyclobenzaprine (FLEXERIL) 10 MG tablet     Sig: Take 1 tablet by mouth nightly as needed for Muscle spasms     Dispense:  10 tablet     Refill:  0    predniSONE (DELTASONE) 10 MG tablet     Sig: Take 4 tablets by mouth once daily for 5 days     Dispense:  20 tablet     Refill:  0     -------------------------  CRITICAL CARE:   CONSULTS: None  PROCEDURES: Procedures     FINAL IMPRESSION      1.  Sciatica of right side          DISPOSITION/PLAN   DISPOSITION Decision To Discharge 07/04/2022 04:58:37 PM      PATIENT REFERRED TO:  Rafael Hoff MD  [address] [facility].  [address]  [address] [address]  [phone]    In 3 days      [facility] ED  [address] [facility]    If symptoms worsen      DISCHARGE MEDICATIONS:  Discharge Medication List as of 7/4/2022  4:58 PM      START taking these medications    Details   cyclobenzaprine (FLEXERIL) 10 MG tablet Take 1 tablet by mouth nightly as needed for Muscle spasms, Disp-10 tablet, R-0Normal      predniSONE (DELTASONE) 10 MG tablet Take 4 tablets by mouth once daily for 5 days, Disp-20 tablet, R-0Normal               Jose Cruz Rajput MD  Attending Emergency Physician                      Jose Cruz Rajput MD  07/05/22 [phone]

## 2022-07-04 NOTE — Clinical Note
Justino Adames was seen and treated in our emergency department on 7/4/2022. He may return to work on 07/08/2022. If you have any questions or concerns, please don't hesitate to call.       July Daley MD

## 2022-07-05 ASSESSMENT — ENCOUNTER SYMPTOMS
NAUSEA: 0
COUGH: 0
VOMITING: 0
BACK PAIN: 1
ABDOMINAL PAIN: 1

## 2022-07-06 ENCOUNTER — HOSPITAL ENCOUNTER (EMERGENCY)
Age: 57
Discharge: HOME | End: 2022-07-06
Attending: EMERGENCY MEDICINE
Payer: COMMERCIAL

## 2022-07-06 VITALS
HEIGHT: 71 IN | TEMPERATURE: 98.3 F | OXYGEN SATURATION: 98 % | SYSTOLIC BLOOD PRESSURE: 126 MMHG | DIASTOLIC BLOOD PRESSURE: 89 MMHG | HEART RATE: 100 BPM | WEIGHT: 210 LBS | RESPIRATION RATE: 16 BRPM | BODY MASS INDEX: 29.4 KG/M2

## 2022-07-06 DIAGNOSIS — M54.31 SCIATICA OF RIGHT SIDE: Primary | ICD-10-CM

## 2022-07-06 PROCEDURE — 99283 EMERGENCY DEPT VISIT LOW MDM: CPT

## 2022-07-06 RX ORDER — HYDROCODONE BITARTRATE AND ACETAMINOPHEN 5; 325 MG/1; MG/1
1 TABLET ORAL EVERY 6 HOURS PRN
Qty: 15 TABLET | Refills: 0 | Status: SHIPPED | OUTPATIENT
Start: 2022-07-06 | End: 2022-07-10

## 2022-07-06 ASSESSMENT — PAIN SCALES - GENERAL: PAINLEVEL_OUTOF10: 10

## 2022-07-06 ASSESSMENT — ENCOUNTER SYMPTOMS
BACK PAIN: 1
SHORTNESS OF BREATH: 0
ABDOMINAL PAIN: 0

## 2022-07-06 ASSESSMENT — PAIN - FUNCTIONAL ASSESSMENT: PAIN_FUNCTIONAL_ASSESSMENT: 0-10

## 2022-07-06 NOTE — Clinical Note
Justino Adames was seen and treated in our emergency department on 7/6/2022. He may return to work on 07/13/2022. If you have any questions or concerns, please don't hesitate to call.       Chris Stark, APRN - CNP

## 2022-07-06 NOTE — ED NOTES
Pt here for chronic back pain  Pt was seen at [facility] today and had imaging done     Tanisha Tan RN  07/06/22 7243

## 2022-07-06 NOTE — ED NOTES
Pt walked back to assigned room with a cane, gait is steady and even      Harper Sutton RN  07/06/22 8279

## 2022-07-06 NOTE — ED PROVIDER NOTES
eMERGENCY dEPARTMENT eNCOUnter   [address] Name: Cally Farias  MRN: [id number]  [place] 1965  Date of evaluation: 7/6/22     Cally Farias is a 55 y.o. male with CC: Back Pain (Pt reports \"popping\" sensation in back and seen at [facility] on Monday; pt c/o pain)      This visit was performed by both a physician and an APC. I performed all aspects of the MDM as documented. The care is provided during an unprecedented national emergency due to the novel coronavirus, COVID 19.     Cammie Fontana DO  Attending Emergency Physician                    [address],   07/06/22 1914

## 2022-07-06 NOTE — ED PROVIDER NOTES
ACETAMINOPHEN (TYLENOL) 500 MG TABLET    Take 1 tablet by mouth every 6 hours as needed for Pain    ALBUTEROL SULFATE  (90 BASE) MCG/ACT INHALER    INHALE TWO PUFFS BY MOUTH EVERY 6 HOURS AS NEEDED FOR WHEEZING OR SHORTNESS OF BREATH (COUGH)    ALUMINUM CHLORIDE (DRYSOL) 20 % EXTERNAL SOLUTION    APPLY TOPICALLY NIGHTLY    AMLODIPINE (NORVASC) 10 MG TABLET    TAKE ONE TABLET BY MOUTH DAILY    CYCLOBENZAPRINE (FLEXERIL) 10 MG TABLET    Take 1 tablet by mouth nightly as needed for Muscle spasms    ELASTIC BANDAGES & SUPPORTS (KNEE BRACE/HINGED BARS LARGE) MISC    Needs knee brace bilaterally, hinged.     FLUTICASONE-SALMETEROL (ADVAIR DISKUS) 500-50 MCG/DOSE DISKUS INHALER    Inhale 1 puff into the lungs every 12 hours    IPRATROPIUM-ALBUTEROL (DUONEB) 0.5-2.5 (3) MG/3ML SOLN NEBULIZER SOLUTION    Take 3 mLs by nebulization every 6 hours as needed for Shortness of Breath or Other (dyspnea, wheezing)    NAPROXEN (NAPROSYN) 250 MG TABLET    Take 1 tablet by mouth 2 times daily as needed for Pain ONLY AS NEEDED, CAN CAUSE GI BLEED, ULCERS AND KIDNEY FAILURE IF TAKING IT EVERY DAY    PANTOPRAZOLE (PROTONIX) 40 MG TABLET    Take 1 tablet by mouth daily    PREDNISONE (DELTASONE) 10 MG TABLET    Take 4 tablets by mouth once daily for 5 days       ALLERGIES     Aspirin, Pcn [penicillins], Bactrim [sulfamethoxazole-trimethoprim], Doxycycline, and Ibuprofen    FAMILY HISTORY       Family History   Problem Relation Age of Onset    Allergies Daughter     Depression Daughter     Colon Cancer Neg Hx     Prostate Cancer Neg Hx           SOCIAL HISTORY       Social History     Socioeconomic History    Marital status:      Spouse name: None    Number of children: None    Years of education: None    Highest education level: None   Occupational History    None   Tobacco Use    Smoking status: Current Every Day Smoker     Packs/day: 1.50     Years: 20.00     Pack years: 30.00     Types: Cigarettes    Smokeless tobacco: Never Used   Substance and Sexual Activity    Alcohol use: Yes     Alcohol/week: 12.0 standard drinks     Types: 12 Cans of beer per week     Comment: 6 beers per day    Drug use: No    Sexual activity: Yes     Partners: Female   Other Topics Concern    None   Social History Narrative    None     Social Determinants of Health     Financial Resource Strain:     Difficulty of Paying Living Expenses: Not on file   Food Insecurity:     Worried About Running Out of Food in the Last Year: Not on file    Wesley of Food in the Last Year: Not on file   Transportation Needs:     Lack of Transportation (Medical): Not on file    Lack of Transportation (Non-Medical): Not on file   Physical Activity:     Days of Exercise per Week: Not on file    Minutes of Exercise per Session: Not on file   Stress:     Feeling of Stress : Not on file   Social Connections:     Frequency of Communication with Friends and Family: Not on file    Frequency of Social Gatherings with Friends and Family: Not on file    Attends Latter-day Services: Not on file    Active Member of [address] [place] [address] or Organizations: Not on file    Attends Club or Organization Meetings: Not on file    Marital Status: Not on file   Intimate Partner Violence:     Fear of Current or Ex-Partner: Not on file    Emotionally Abused: Not on file    Physically Abused: Not on file    Sexually Abused: Not on file   Housing Stability:     Unable to Pay for Housing in the Last Year: Not on file    Number of [place] in the Last Year: Not on file    Unstable Housing in the Last Year: Not on file         REVIEW OF SYSTEMS    (2-9 systems for level 4, 10 or more for level 5)     Review of Systems   Constitutional: Positive for activity change. Negative for fever. Respiratory: Negative for shortness of breath. Cardiovascular: Negative for chest pain. Gastrointestinal: Negative for abdominal pain. Genitourinary: Negative for dysuria.    Musculoskeletal: Positive for back pain and gait problem. All other systems reviewed and are negative. Except as noted above the remainder of the review of systems was reviewed and negative. PHYSICAL EXAM    (up to 7 for level 4, 8 or more for level 5)     ED Triage Vitals [07/06/22 1235]   BP Temp Temp src Heart Rate Resp SpO2 Height Weight   126/89 98.3 °F (36.8 °C) -- 100 16 98 % 5' 11\" (1.803 m) 210 lb (95.3 kg)       Physical Exam  Constitutional:       Appearance: Normal appearance. He is well-developed, well-groomed and normal weight. HENT:      Head: Normocephalic. Right Ear: External ear normal.      Left Ear: External ear normal.      Nose: Nose normal.   Eyes:      Conjunctiva/sclera: Conjunctivae normal.   Pulmonary:      Effort: Pulmonary effort is normal. No respiratory distress. Musculoskeletal:      Cervical back: Normal range of motion. Lumbar back: Tenderness present. Decreased range of motion. Back:       Comments: Patient has tenderness to the right lumbar area right buttock area. No swelling, rash erythema or ecchymosis. Skin:     General: Skin is warm and dry. Neurological:      Mental Status: He is alert and oriented to person, place, and time. Psychiatric:         Mood and Affect: Mood normal.         Behavior: Behavior normal.         Thought Content:  Thought content normal.         Judgment: Judgment normal.           DIAGNOSTIC RESULTS     EKG:All EKG's are interpreted by the Emergency Department Physician who either signs or Co-signs this chart in the absence of a cardiologist.        RADIOLOGY:   Non-plain film images such as CT, Ultrasound and MRI are read by theradiologist. Plain radiographic images are visualized and preliminarily interpreted by the emergency physician with the below findings:    CT ABDOMEN PELVIS WO CONTRAST Additional Contrast? None    Result Date: 7/4/2022  EXAMINATION: CT OF THE ABDOMEN AND PELVIS WITHOUT CONTRAST 7/4/2022 4:23 pm TECHNIQUE: CT Constance [place] reviewed. He is also to continue taking prednisone. Follow-up with his primary care provider. Return precautions provided. Vitals:    Vitals:    07/06/22 1235   BP: 126/89   Pulse: 100   Resp: 16   Temp: 98.3 °F (36.8 °C)   SpO2: 98%   Weight: 210 lb (95.3 kg)   Height: 5' 11\" (1.803 m)         CONSULTS:  None    RES:  Procedures    FINAL IMPRESSION      1. Sciatica of right side          DISPOSITION/PLAN   DISPOSITION Decision To Discharge 07/06/2022 01:31:09 PM      PATIENT REFERRED TO:   Tae Delarosa MD  [address] [facility].  [address] [address]  [phone]    Schedule an appointment as soon as possible for a visit       [facility] ED  [address] [facility]  [phone]    If symptoms worsen      DISCHARGE MEDICATIONS:     New Prescriptions    HYDROCODONE-ACETAMINOPHEN (NORCO) 5-325 MG PER TABLET    Take 1 tablet by mouth every 6 hours as needed for Pain for up to 4 days. Intended supply: 3 days.  Take lowest dose possible to manage pain     Electronically signed by JO Holm 7/6/2022 at 1:33 PM            JO Holm CNP  07/06/22 2851

## 2022-07-09 ENCOUNTER — TELEPHONE (OUTPATIENT)
Dept: FAMILY MEDICINE CLINIC | Age: 57
End: 2022-07-09

## 2022-07-09 DIAGNOSIS — M54.31 SCIATICA OF RIGHT SIDE: Primary | ICD-10-CM

## 2022-07-09 RX ORDER — GABAPENTIN 100 MG/1
100 CAPSULE ORAL 3 TIMES DAILY
Qty: 9 CAPSULE | Refills: 0 | Status: SHIPPED | OUTPATIENT
Start: 2022-07-09 | End: 2022-07-15 | Stop reason: SDUPTHER

## 2022-07-09 RX ORDER — GABAPENTIN 100 MG/1
100 CAPSULE ORAL 3 TIMES DAILY
Qty: 9 CAPSULE | Refills: 0 | Status: SHIPPED | OUTPATIENT
Start: 2022-07-09 | End: 2022-07-09

## 2022-07-09 RX ORDER — METHYLPREDNISOLONE 4 MG/1
TABLET ORAL
Qty: 1 KIT | Refills: 0 | Status: SHIPPED | OUTPATIENT
Start: 2022-07-09 | End: 2022-09-30 | Stop reason: ALTCHOICE

## 2022-07-09 RX ORDER — METHOCARBAMOL 500 MG/1
500 TABLET, FILM COATED ORAL 4 TIMES DAILY PRN
Qty: 40 TABLET | Refills: 0 | Status: SHIPPED | OUTPATIENT
Start: 2022-07-09 | End: 2022-07-10

## 2022-07-10 ENCOUNTER — APPOINTMENT (OUTPATIENT)
Dept: GENERAL RADIOLOGY | Age: 57
End: 2022-07-10
Payer: COMMERCIAL

## 2022-07-10 ENCOUNTER — HOSPITAL ENCOUNTER (EMERGENCY)
Age: 57
Discharge: HOME | End: 2022-07-10
Attending: EMERGENCY MEDICINE
Payer: COMMERCIAL

## 2022-07-10 VITALS
DIASTOLIC BLOOD PRESSURE: 97 MMHG | HEIGHT: 71 IN | HEART RATE: 90 BPM | SYSTOLIC BLOOD PRESSURE: 134 MMHG | RESPIRATION RATE: 20 BRPM | OXYGEN SATURATION: 97 % | TEMPERATURE: 99.4 F | BODY MASS INDEX: 29.4 KG/M2 | WEIGHT: 210 LBS

## 2022-07-10 DIAGNOSIS — M54.41 ACUTE RIGHT-SIDED LOW BACK PAIN WITH RIGHT-SIDED SCIATICA: Primary | ICD-10-CM

## 2022-07-10 PROCEDURE — 6370000000 HC RX 637 (ALT 250 FOR IP): Performed by: STUDENT IN AN ORGANIZED HEALTH CARE EDUCATION/TRAINING PROGRAM

## 2022-07-10 PROCEDURE — 72170 X-RAY EXAM OF PELVIS: CPT

## 2022-07-10 PROCEDURE — 99283 EMERGENCY DEPT VISIT LOW MDM: CPT

## 2022-07-10 PROCEDURE — 72100 X-RAY EXAM L-S SPINE 2/3 VWS: CPT

## 2022-07-10 RX ORDER — LIDOCAINE 4 G/G
1 PATCH TOPICAL DAILY
Qty: 30 PATCH | Refills: 0 | Status: SHIPPED | OUTPATIENT
Start: 2022-07-10 | End: 2022-08-09

## 2022-07-10 RX ORDER — ACETAMINOPHEN 325 MG/1
650 TABLET ORAL EVERY 6 HOURS PRN
Qty: 30 TABLET | Refills: 0 | Status: SHIPPED | OUTPATIENT
Start: 2022-07-10

## 2022-07-10 RX ORDER — NAPROXEN 250 MG/1
500 TABLET ORAL ONCE
Status: COMPLETED | OUTPATIENT
Start: 2022-07-10 | End: 2022-07-10

## 2022-07-10 RX ORDER — ACETAMINOPHEN 500 MG
1000 TABLET ORAL ONCE
Status: COMPLETED | OUTPATIENT
Start: 2022-07-10 | End: 2022-07-10

## 2022-07-10 RX ORDER — CYCLOBENZAPRINE HCL 10 MG
10 TABLET ORAL NIGHTLY PRN
Qty: 10 TABLET | Refills: 0 | Status: SHIPPED | OUTPATIENT
Start: 2022-07-10 | End: 2022-07-15 | Stop reason: SDUPTHER

## 2022-07-10 RX ADMIN — ACETAMINOPHEN 1000 MG: 500 TABLET ORAL at 17:11

## 2022-07-10 RX ADMIN — NAPROXEN 500 MG: 250 TABLET ORAL at 17:11

## 2022-07-10 ASSESSMENT — PAIN DESCRIPTION - ORIENTATION: ORIENTATION: RIGHT

## 2022-07-10 ASSESSMENT — PAIN DESCRIPTION - LOCATION: LOCATION: BUTTOCKS;BACK

## 2022-07-10 ASSESSMENT — PAIN SCALES - GENERAL: PAINLEVEL_OUTOF10: 7

## 2022-07-10 NOTE — ED PROVIDER NOTES
Signed out by Dr. Pieter Cuevas at the completion of his shift. Notes reviewed. W/u in progress.        Clovis Duncan MD  07/10/22 6085

## 2022-07-10 NOTE — Clinical Note
Magali Huang was seen and treated in our emergency department on 7/10/2022. He may return to work on 07/13/2022. If you have any questions or concerns, please don't hesitate to call.       Kimberlyn Cerda, DO

## 2022-07-10 NOTE — TELEPHONE ENCOUNTER
Patient will come on Monday to  handicap sticker, please give to Dr. Clary Mcghee to sign, and to print out her name and NPI    Handicap prescription printed in med room at this time    FYI    Perfect served for back pain, and sciatica on the right side, patient reports \"right butt cheek is swollen\" shooting down to the right side. Since July 3rd snapped something in his back and he went to emergency roomx 2 and he was told that he has sciatic nerve pinched. Taking muscle relaxant, Flexeril once a day and prednisone taking them but do not help. He says I cannot do anything, I cannot stand up  right lower extremity numb. On CT abdomen was done which showed osteoarthritis which is mild in the hips. He says when he went back to the emergency room an opiate was done but does not help with pain either     He says he started new job and cannot miss work on Monday. No imaging of the back was done in the emergency room. He is requesting gabapentin to see if it will help. Patient says he has been having chronic back pain for a long time but this is a flareup    I told him gabapentin is controlled substance, he understands, side effects discussed  He is also requesting handicap sticker, will come on Monday to pick it up  He says prednisone does not help and Flexeril does not help either  I suggested Medrol pack instead, he understands, we will also change muscle relaxant. Advised not to drive, and to stop Flexeril  I advised him if he needs letter for work, he will need to be seen or to go to an urgent care on Monday morning    The patient verbalizes understanding and agrees with the plan. Diagnosis Orders   1. Sciatica of right side  methocarbamol (ROBAXIN) 500 MG tablet    gabapentin (NEURONTIN) 100 MG capsule    methylPREDNISolone (MEDROL, SELINA,) 4 MG tablet    Handicap Placard MISC    DISCONTINUED: gabapentin (NEURONTIN) 100 MG capsule        No future appointments.

## 2022-07-10 NOTE — ED PROVIDER NOTES
[facility]  Emergency Department  Faculty Attestation     I performed a history and physical examination of the patient and discussed management with the resident. I reviewed the residents note and agree with the documented findings and plan of care. Any areas of disagreement are noted on the chart. I was personally present for the key portions of any procedures. I have documented in the chart those procedures where I was not present during the key portions. I have reviewed the emergency nurses triage note. I agree with the chief complaint, past medical history, past surgical history, allergies, medications, social and family history as documented unless otherwise noted below. For Physician Assistant/ Nurse Practitioner cases/documentation I have personally evaluated this patient and have completed at least one if not all key elements of the E/M (history, physical exam, and MDM). Additional findings are as noted. Primary Care Physician:  Mary Jane Orosco MD    Screenings:  [unfilled]    CHIEF COMPLAINT       Chief Complaint   Patient presents with    Back Pain     July 3 bent over/pulled something in back. pain on right side of back since. seen [facility] and [facility] still having pain, can't hardly walk wiht pain       RECENT VITALS:   Temp: 99.4 °F (37.4 °C),  Heart Rate: 90, Resp: 20, BP: (!) 134/97    LABS:  Labs Reviewed - No data to display    Radiology  No orders to display         Attending Physician Additional  Notes    Had a sudden snapping popping pain in his back, points to his right SI joint, July 3 and has been having pain and tightness ever since. He is unable to work. Scheduled for tomorrow. Has been seen at [facility] and had a CT abdomen. There is no direct trauma to the back. He has radiation of paresthesias into the right groin to just above the medial knee. No weakness. No bowel or bladder incontinence or retention.   No saddle anesthesia. No weakness in either extremity. No bilateral symptoms. He points to pain to his SI joint especially. No relief with NSAIDs, Flexeril, prednisone Dosepak. He scheduled to start gabapentin. He was seen again at [facility] for evaluation of the same complaints. No urinary symptoms. On exam he is uncomfortable but nontoxic afebrile vital signs normal slightly hypertensive. Normal motor strength and gait. Negative straight leg raising. Normal sensation light touch. No bony tenderness of the lumbar spine. Impression is sacroiliitis, lumbar radiculopathy sensory in nature. Plan is adjust medications, work note. Daily Stone.  Mariposa Chaudhry MD, [facility]  Attending Emergency  Physician               Asher Cruz MD  07/10/22 7682

## 2022-07-11 PROBLEM — M51.379 DDD (DEGENERATIVE DISC DISEASE), LUMBOSACRAL: Status: ACTIVE | Noted: 2022-07-04

## 2022-07-11 PROBLEM — M51.37 DDD (DEGENERATIVE DISC DISEASE), LUMBOSACRAL: Status: ACTIVE | Noted: 2022-07-04

## 2022-07-12 ENCOUNTER — TELEPHONE (OUTPATIENT)
Dept: FAMILY MEDICINE CLINIC | Age: 57
End: 2022-07-12

## 2022-07-12 NOTE — ED PROVIDER NOTES
[facility]  ED  Emergency Department Encounter  EmergencyMedicine Resident     Pt Name:Anastacio Robbins  MRN: [id number]  [place] 1965  Date of evaluation: 7/12/22  PCP:  Tae Delarosa MD    This patient was evaluated in the Emergency Department for symptoms described in the history of present illness. The patient was evaluated in the context of the global COVID-19 pandemic, which necessitated consideration that the patient might be at risk for infection with the SARS-CoV-2 virus that causes COVID-19. Institutional protocols and algorithms that pertain to the evaluation of patients at risk for COVID-19 are in a state of rapid change based on information released by regulatory bodies including the CDC and federal and state organizations. These policies and algorithms were followed during the patient's care in the ED. CHIEF COMPLAINT       Chief Complaint   Patient presents with    Back Pain     July 3 bent over/pulled something in back. pain on right side of back since. seen [facility] and Gaby Dies still having pain, can't hardly walk wiht pain       HISTORY OF PRESENT ILLNESS  (Location/Symptom, Timing/Onset, Context/Setting, Quality, Duration, Modifying Factors, Severity.)      Matthias Silva is a 55 y.o. male who presents with low back pain. Patient states that he lifts heavy things for work, may have lifted something incorrectly, felt a pop in his back, experienced pain in the right lower aspect of his lumbar spine, additionally in the superior aspect of his gluteal muscle. Patient states that the pain radiated down his leg, he is still able to walk, no bowel or bladder incontinence, no fevers chills nausea vomiting dizziness drowsiness or abdominal pain. Patient does not have any numbness or tingling in his foot, strength is otherwise preserved.     PAST MEDICAL / SURGICAL / SOCIAL / FAMILY HISTORY      has a past medical history of Alcohol abuse, Allergic rhinitis, Asthma, COPD (chronic obstructive pulmonary disease) (HCC), DDD (degenerative disc disease), lumbar, GERD (gastroesophageal reflux disease), Hyperlipidemia with target LDL less than 100, Hypertension, Pansinusitis, Primary osteoarthritis of both hips, and Primary osteoarthritis of both knees pe Xrays. has a past surgical history that includes Ankle fracture surgery (Right) and Facial cosmetic surgery (1978). Social History     Socioeconomic History    Marital status:      Spouse name: Not on file    Number of children: Not on file    Years of education: Not on file    Highest education level: Not on file   Occupational History    Not on file   Tobacco Use    Smoking status: Current Every Day Smoker     Packs/day: 1.50     Years: 20.00     Pack years: 30.00     Types: Cigarettes    Smokeless tobacco: Never Used   Substance and Sexual Activity    Alcohol use: Yes     Alcohol/week: 12.0 standard drinks     Types: 12 Cans of beer per week     Comment: 6 beers per day    Drug use: No    Sexual activity: Yes     Partners: Female   Other Topics Concern    Not on file   Social History Narrative    Not on file     Social Determinants of Health     Financial Resource Strain:     Difficulty of Paying Living Expenses: Not on file   Food Insecurity:     Worried About Running Out of Food in the Last Year: Not on file    Wesley of Food in the Last Year: Not on file   Transportation Needs:     Lack of Transportation (Medical): Not on file    Lack of Transportation (Non-Medical):  Not on file   Physical Activity:     Days of Exercise per Week: Not on file    Minutes of Exercise per Session: Not on file   Stress:     Feeling of Stress : Not on file   Social Connections:     Frequency of Communication with Friends and Family: Not on file    Frequency of Social Gatherings with Friends and Family: Not on file    Attends Oriental orthodox Services: Not on file    Active Member of Clubs or Organizations: Not on file    Attends Club or Organization Meetings: Not on file    Marital Status: Not on file   Intimate Partner Violence:     Fear of Current or Ex-Partner: Not on file    Emotionally Abused: Not on file    Physically Abused: Not on file    Sexually Abused: Not on file   Housing Stability:     Unable to Pay for Housing in the Last Year: Not on file    Number of D[place] in the Last Year: Not on file    Unstable Housing in the Last Year: Not on file       Family History   Problem Relation Age of Onset    Allergies Daughter     Depression Daughter     Colon Cancer Neg Hx     Prostate Cancer Neg Hx        Allergies:  Aspirin, Pcn [penicillins], Bactrim [sulfamethoxazole-trimethoprim], Doxycycline, and Ibuprofen    Home Medications:  Prior to Admission medications    Medication Sig Start Date End Date Taking? Authorizing Provider   acetaminophen (TYLENOL) 325 MG tablet Take 2 tablets by mouth every 6 hours as needed for Pain 7/10/22  Yes Nicky Boland DO   cyclobenzaprine (FLEXERIL) 10 MG tablet Take 1 tablet by mouth nightly as needed for Muscle spasms 7/10/22 7/20/22 Yes Nicky Boland DO   lidocaine 4 % external patch Place 1 patch onto the skin daily 7/10/22 8/9/22 Yes Nicky Boland DO   gabapentin (NEURONTIN) 100 MG capsule Take 1 capsule by mouth 3 times daily for 3 days. 7/9/22 7/12/22  Vi Mccarty MD   methylPREDNISolone (MEDROL, SELINA,) 4 MG tablet Take by mouth, with food. Keep low carb, low salt diet while taking it 7/9/22   Samantha Benitez MD   Handicap Placard MISC by Does not apply route Can't walk greater than 200 feet. Expires in 5 years.  7/9/22   Samantha Benitez MD   cyclobenzaprine (FLEXERIL) 10 MG tablet Take 1 tablet by mouth nightly as needed for Muscle spasms 7/4/22 7/14/22  Clair Venegas MD   amLODIPine (NORVASC) 10 MG tablet TAKE ONE TABLET BY MOUTH DAILY 5/5/22   Samantha Benitez MD   aluminum chloride (DRYSOL) 20 % external solution APPLY TOPICALLY NIGHTLY 3/3/22   Chanelle Smith DPM   fluticasone-salmeterol (ADVAIR DISKUS) 500-50 MCG/DOSE diskus inhaler Inhale 1 puff into the lungs every 12 hours 1/7/22   Sandy Ramirez MD   albuterol sulfate  (90 Base) MCG/ACT inhaler INHALE TWO PUFFS BY MOUTH EVERY 6 HOURS AS NEEDED FOR WHEEZING OR SHORTNESS OF BREATH (COUGH) 1/7/22   Sandy Ramirez MD   ipratropium-albuterol (DUONEB) 0.5-2.5 (3) MG/3ML SOLN nebulizer solution Take 3 mLs by nebulization every 6 hours as needed for Shortness of Breath or Other (dyspnea, wheezing) 1/7/22   Sandy Ramirez MD   naproxen (NAPROSYN) 250 MG tablet Take 1 tablet by mouth 2 times daily as needed for Pain ONLY AS NEEDED, CAN CAUSE GI BLEED, ULCERS AND KIDNEY FAILURE IF TAKING IT EVERY DAY 8/18/21   Sandy Ramirez MD   pantoprazole (PROTONIX) 40 MG tablet Take 1 tablet by mouth daily 8/18/21   Sandy Ramirez MD   acetaminophen (TYLENOL) 500 MG tablet Take 1 tablet by mouth every 6 hours as needed for Pain 3/24/21   Sandy Ramirez MD   Elastic Bandages & Supports (KNEE BRACE/HINGED BARS LARGE) MISC Needs knee brace bilaterally, hinged. 3/24/21   Sandy Ramirez MD       REVIEW OF SYSTEMS    (2-9 systems for level 4, 10 or more for level 5)      Review of Systems   Constitutional: Negative for chills, fatigue and fever. HENT: Negative for congestion and trouble swallowing. Eyes: Negative for visual disturbance. Respiratory: Negative for cough, chest tightness and shortness of breath. Gastrointestinal: Negative for abdominal pain, diarrhea, nausea and vomiting. Endocrine: Negative for polyuria. Genitourinary: Negative for dysuria, flank pain, hematuria and urgency. Musculoskeletal: Positive for back pain. Negative for myalgias. Skin: Negative for color change. Allergic/Immunologic: Negative for immunocompromised state.    Neurological: Negative for dizziness, syncope, weakness, light-headedness and headaches. PHYSICAL EXAM   (up to 7 for level 4, 8 or more for level 5)      INITIAL VITALS:   BP (!) 134/97   Pulse 90   Temp 99.4 °F (37.4 °C) (Oral)   Resp 20   Ht 5' 11\" (1.803 m)   Wt 210 lb (95.3 kg)   SpO2 97%   BMI 29.29 kg/m²     Physical Exam  Constitutional:       Appearance: He is well-developed. HENT:      Head: Normocephalic and atraumatic. Nose: Nose normal.      Mouth/Throat:      Mouth: Mucous membranes are moist.   Eyes:      Conjunctiva/sclera: Conjunctivae normal.   Cardiovascular:      Rate and Rhythm: Normal rate and regular rhythm. Heart sounds: Normal heart sounds. No murmur heard. No friction rub. Pulmonary:      Effort: Pulmonary effort is normal. No respiratory distress. Breath sounds: Normal breath sounds. No rales. Chest:      Chest wall: No tenderness. Abdominal:      General: Abdomen is flat. Palpations: Abdomen is soft. There is no hepatomegaly, splenomegaly or pulsatile mass. Tenderness: There is no abdominal tenderness. Hernia: No hernia is present. Musculoskeletal:      Lumbar back: Spasms and tenderness present. No swelling, edema, deformity or signs of trauma. Skin:     General: Skin is warm. Capillary Refill: Capillary refill takes less than 2 seconds. Neurological:      General: No focal deficit present. Mental Status: He is alert. Motor: No weakness.          DIFFERENTIAL  DIAGNOSIS     PLAN (LABS / IMAGING / EKG):  Orders Placed This Encounter   Procedures    XR LUMBAR SPINE (2-3 VIEWS)    XR PELVIS (1-2 VIEWS)    External Referral To Physical Therapy       MEDICATIONS ORDERED:  Orders Placed This Encounter   Medications    acetaminophen (TYLENOL) tablet 1,000 mg    naproxen (NAPROSYN) tablet 500 mg    acetaminophen (TYLENOL) 325 MG tablet     Sig: Take 2 tablets by mouth every 6 hours as needed for Pain     Dispense:  30 tablet     Refill:  0    cyclobenzaprine (FLEXERIL) 10 MG tablet     Sig: Take 1 tablet by mouth nightly as needed for Muscle spasms     Dispense:  10 tablet     Refill:  0    lidocaine 4 % external patch     Sig: Place 1 patch onto the skin daily     Dispense:  30 patch     Refill:  0       DDX: Lumbago, sciatica, lumbar muscle strain    DIAGNOSTIC RESULTS / EMERGENCY DEPARTMENT COURSE / MDM   LAB RESULTS:  No results found for this visit on 07/10/22. RADIOLOGY:  CT ABDOMEN PELVIS WO CONTRAST Additional Contrast? None    Result Date: 7/4/2022  No acute findings. XR LUMBAR SPINE (2-3 VIEWS)    Result Date: 7/11/2022  Pelvis:  No acute displaced fracture identified. Mild to moderate bilateral hip joint degenerative changes. Lumbar Spine:  No evidence of an acute compression deformity or traumatic malalignment. Multilevel endplate and facet degenerative changes. Grade 1 anterolisthesis of L5 on S1. Mild retrolisthesis is seen at multiple levels. XR PELVIS (1-2 VIEWS)    Result Date: 7/11/2022  Pelvis:  No acute displaced fracture identified. Mild to moderate bilateral hip joint degenerative changes. Lumbar Spine:  No evidence of an acute compression deformity or traumatic malalignment. Multilevel endplate and facet degenerative changes. Grade 1 anterolisthesis of L5 on S1. Mild retrolisthesis is seen at multiple levels. Assessment/Plan: Patient has mild anterior listhesis of L5 on S1, back pain consistent with sciatic nerve pain, low suspicion for cauda equina, patient is able to ambulate, no bowel or bladder incontinence. No otherwise unremarkable physical exam, patient was treated conservatively in the ER today, recommended follow-up outpatient, patient is appropriate for discharge, can consider MRI as outpatient with primary care. Patient was given ER return precautions, no further invention again at this time. FINAL IMPRESSION      1.  Acute right-sided low back pain with right-sided sciatica          DISPOSITION / PLAN     DISPOSITION Decision To Discharge 07/10/2022 05:34:44 PM      PATIENT REFERRED TO:  Sandy Ramirez MD  [address]  [address]  [address] [facility]    Schedule an appointment as soon as possible for a visit   As needed    [facility] ED  [address] [place] [address]  [phone]  Go to   As needed      DISCHARGE MEDICATIONS:  Discharge Medication List as of 7/10/2022  5:58 PM      START taking these medications    Details   !! acetaminophen (TYLENOL) 325 MG tablet Take 2 tablets by mouth every 6 hours as needed for Pain, Disp-30 tablet, R-0Print      !! cyclobenzaprine (FLEXERIL) 10 MG tablet Take 1 tablet by mouth nightly as needed for Muscle spasms, Disp-10 tablet, R-0Print      lidocaine 4 % external patch Place 1 patch onto the skin daily, TransDERmal, DAILY Starting Sun 7/10/2022, Until Tue 8/9/2022, For 30 days, Disp-30 patch, R-0, Print       !! - Potential duplicate medications found. Please discuss with provider.           Nora Godinez DO  Emergency Medicine Resident    (Please note that portions of thisnote were completed with a voice recognition program.  Efforts were made to edit the dictations but occasionally words are mis-transcribed.)        Isadora Martin DO  Resident  07/13/22 [id number]

## 2022-07-12 NOTE — TELEPHONE ENCOUNTER
Please check with patient if any issues that could be addressed for him      MANUELITO decker asked me to call him for \" personal question to ask directly to you\"  I called the patient at the number given, [phone] and left voice message to call us during office hours if not urgent , or to page us again if urgent

## 2022-07-13 ASSESSMENT — ENCOUNTER SYMPTOMS
COLOR CHANGE: 0
SHORTNESS OF BREATH: 0
COUGH: 0
BACK PAIN: 1
TROUBLE SWALLOWING: 0
DIARRHEA: 0
CHEST TIGHTNESS: 0
ABDOMINAL PAIN: 0
VOMITING: 0
NAUSEA: 0

## 2022-07-15 DIAGNOSIS — M54.31 SCIATICA OF RIGHT SIDE: ICD-10-CM

## 2022-07-15 RX ORDER — CYCLOBENZAPRINE HCL 10 MG
10 TABLET ORAL NIGHTLY PRN
Qty: 30 TABLET | Refills: 0 | Status: SHIPPED | OUTPATIENT
Start: 2022-07-15 | End: 2022-09-30 | Stop reason: SDUPTHER

## 2022-07-15 RX ORDER — GABAPENTIN 100 MG/1
100 CAPSULE ORAL 3 TIMES DAILY
Qty: 21 CAPSULE | Refills: 0 | Status: SHIPPED | OUTPATIENT
Start: 2022-07-15 | End: 2022-09-30 | Stop reason: ALTCHOICE

## 2022-07-15 NOTE — TELEPHONE ENCOUNTER
Needs appointment for further gabapentin which is controlled and needs med contract    No future appointments.

## 2022-09-08 DIAGNOSIS — K21.9 GASTROESOPHAGEAL REFLUX DISEASE WITHOUT ESOPHAGITIS: ICD-10-CM

## 2022-09-08 RX ORDER — PANTOPRAZOLE SODIUM 40 MG/1
TABLET, DELAYED RELEASE ORAL
Qty: 90 TABLET | Refills: 0 | Status: SHIPPED | OUTPATIENT
Start: 2022-09-08 | End: 2022-12-01

## 2022-09-30 ENCOUNTER — OFFICE VISIT (OUTPATIENT)
Dept: FAMILY MEDICINE CLINIC | Age: 57
End: 2022-09-30
Payer: COMMERCIAL

## 2022-09-30 VITALS
BODY MASS INDEX: 28.11 KG/M2 | HEART RATE: 96 BPM | SYSTOLIC BLOOD PRESSURE: 122 MMHG | WEIGHT: 200.8 LBS | DIASTOLIC BLOOD PRESSURE: 76 MMHG | HEIGHT: 71 IN | OXYGEN SATURATION: 98 %

## 2022-09-30 DIAGNOSIS — G89.29 CHRONIC PAIN OF BOTH KNEES: ICD-10-CM

## 2022-09-30 DIAGNOSIS — M17.0 PRIMARY OSTEOARTHRITIS OF BOTH KNEES: ICD-10-CM

## 2022-09-30 DIAGNOSIS — Z12.5 PROSTATE CANCER SCREENING: ICD-10-CM

## 2022-09-30 DIAGNOSIS — I10 ESSENTIAL HYPERTENSION: ICD-10-CM

## 2022-09-30 DIAGNOSIS — R26.2 DIFFICULTY WALKING: ICD-10-CM

## 2022-09-30 DIAGNOSIS — M25.562 CHRONIC PAIN OF BOTH KNEES: ICD-10-CM

## 2022-09-30 DIAGNOSIS — J44.9 COPD WITH ASTHMA (HCC): ICD-10-CM

## 2022-09-30 DIAGNOSIS — Z23 ENCOUNTER FOR IMMUNIZATION: ICD-10-CM

## 2022-09-30 DIAGNOSIS — Z12.11 SCREEN FOR COLON CANCER: ICD-10-CM

## 2022-09-30 DIAGNOSIS — Z87.891 PERSONAL HISTORY OF TOBACCO USE: ICD-10-CM

## 2022-09-30 DIAGNOSIS — Z13.6 SCREENING FOR CARDIOVASCULAR CONDITION: ICD-10-CM

## 2022-09-30 DIAGNOSIS — M25.561 CHRONIC PAIN OF BOTH KNEES: ICD-10-CM

## 2022-09-30 DIAGNOSIS — Z00.01 ENCOUNTER FOR WELL ADULT EXAM WITH ABNORMAL FINDINGS: Primary | ICD-10-CM

## 2022-09-30 PROCEDURE — G8427 DOCREV CUR MEDS BY ELIG CLIN: HCPCS | Performed by: FAMILY MEDICINE

## 2022-09-30 PROCEDURE — 99396 PREV VISIT EST AGE 40-64: CPT | Performed by: FAMILY MEDICINE

## 2022-09-30 PROCEDURE — G0296 VISIT TO DETERM LDCT ELIG: HCPCS | Performed by: FAMILY MEDICINE

## 2022-09-30 PROCEDURE — 3017F COLORECTAL CA SCREEN DOC REV: CPT | Performed by: FAMILY MEDICINE

## 2022-09-30 PROCEDURE — 99214 OFFICE O/P EST MOD 30 MIN: CPT | Performed by: FAMILY MEDICINE

## 2022-09-30 PROCEDURE — G8419 CALC BMI OUT NRM PARAM NOF/U: HCPCS | Performed by: FAMILY MEDICINE

## 2022-09-30 PROCEDURE — 3023F SPIROM DOC REV: CPT | Performed by: FAMILY MEDICINE

## 2022-09-30 PROCEDURE — 90674 CCIIV4 VAC NO PRSV 0.5 ML IM: CPT | Performed by: FAMILY MEDICINE

## 2022-09-30 PROCEDURE — 4004F PT TOBACCO SCREEN RCVD TLK: CPT | Performed by: FAMILY MEDICINE

## 2022-09-30 RX ORDER — AMLODIPINE BESYLATE 10 MG/1
10 TABLET ORAL DAILY
Qty: 90 TABLET | Refills: 3 | Status: SHIPPED | OUTPATIENT
Start: 2022-09-30

## 2022-09-30 RX ORDER — ALBUTEROL SULFATE 90 UG/1
AEROSOL, METERED RESPIRATORY (INHALATION)
Qty: 18 G | Refills: 2 | Status: SHIPPED | OUTPATIENT
Start: 2022-09-30

## 2022-09-30 RX ORDER — FLUTICASONE PROPIONATE AND SALMETEROL 500; 50 UG/1; UG/1
1 POWDER RESPIRATORY (INHALATION) EVERY 12 HOURS
Qty: 60 EACH | Refills: 3 | Status: SHIPPED | OUTPATIENT
Start: 2022-09-30

## 2022-09-30 RX ORDER — CYCLOBENZAPRINE HCL 10 MG
10 TABLET ORAL NIGHTLY PRN
Qty: 30 TABLET | Refills: 0 | Status: SHIPPED | OUTPATIENT
Start: 2022-09-30

## 2022-09-30 RX ORDER — BNT162B2 0.23 MG/2.25ML
0.3 INJECTION, SUSPENSION INTRAMUSCULAR ONCE
Qty: 0.3 ML | Refills: 0 | Status: SHIPPED | OUTPATIENT
Start: 2022-09-30 | End: 2022-09-30

## 2022-09-30 SDOH — ECONOMIC STABILITY: FOOD INSECURITY: WITHIN THE PAST 12 MONTHS, THE FOOD YOU BOUGHT JUST DIDN'T LAST AND YOU DIDN'T HAVE MONEY TO GET MORE.: NEVER TRUE

## 2022-09-30 SDOH — ECONOMIC STABILITY: FOOD INSECURITY: WITHIN THE PAST 12 MONTHS, YOU WORRIED THAT YOUR FOOD WOULD RUN OUT BEFORE YOU GOT MONEY TO BUY MORE.: NEVER TRUE

## 2022-09-30 ASSESSMENT — ENCOUNTER SYMPTOMS
COUGH: 1
WHEEZING: 0
ABDOMINAL DISTENTION: 0
DIARRHEA: 0
CHEST TIGHTNESS: 0
NAUSEA: 0
SHORTNESS OF BREATH: 1
BACK PAIN: 1
CONSTIPATION: 0
ABDOMINAL PAIN: 0
VOMITING: 0

## 2022-09-30 ASSESSMENT — PATIENT HEALTH QUESTIONNAIRE - PHQ9
SUM OF ALL RESPONSES TO PHQ QUESTIONS 1-9: 0
SUM OF ALL RESPONSES TO PHQ9 QUESTIONS 1 & 2: 0
SUM OF ALL RESPONSES TO PHQ QUESTIONS 1-9: 0
1. LITTLE INTEREST OR PLEASURE IN DOING THINGS: 0
2. FEELING DOWN, DEPRESSED OR HOPELESS: 0
SUM OF ALL RESPONSES TO PHQ QUESTIONS 1-9: 0
SUM OF ALL RESPONSES TO PHQ QUESTIONS 1-9: 0

## 2022-09-30 ASSESSMENT — SOCIAL DETERMINANTS OF HEALTH (SDOH): HOW HARD IS IT FOR YOU TO PAY FOR THE VERY BASICS LIKE FOOD, HOUSING, MEDICAL CARE, AND HEATING?: NOT HARD AT ALL

## 2022-09-30 NOTE — PATIENT INSTRUCTIONS
Learning About Benefits From Quitting Smoking  Why is it important to quit smoking? If you're thinking about quitting smoking, you may have a few reasons to be smoke-free. Your health may be one of them. When you quit smoking, you lower your risk for many serious health problems, such as cancer, lung disease, heart attack, stroke, blood vessel disease, and blindness from macular degeneration. When you're smoke-free, you get sick less often, and you heal faster. You are less likely to get colds, flu, bronchitis, and pneumonia. As a nonsmoker, you may find that your mood is better and you are less stressed. When and how will you feel healthier? Quitting has real health benefits that start from day 1 of being smoke-free. And the longer you stay smoke-free, the healthier you get and the better you feel. The first hours  After just 20 minutes, your blood pressure and heart rate go down. That means there's less stress on your heart and blood vessels. Within 12 hours, the level of carbon monoxide in your blood drops back to normal. That makes room for more oxygen. With more oxygen in your body, you may notice that you have more energy than when you smoked. After 2 weeks  Your lungs start to work better. Your risk of heart attack starts to drop. After 1 month  When your lungs are clear, you cough less and breathe deeper, so it's easier to be active. Your sense of taste and smell return. That means you can enjoy food more than you have since you started smoking. Over the years  Over the years, your risks of heart disease, heart attack, and stroke are lower. After 10 years, your risk of dying from lung cancer is cut by about half. And your risk for many other types of cancer is lower too. How would quitting help others in your life? When you quit smoking, you improve the health of everyone who now breathes in your smoke. Their heart, lung, and cancer risks drop, much like yours. They are sick less. For babies and small children, living smoke-free means they're less likely to have ear infections, pneumonia, and bronchitis. If you're a woman who is or will be pregnant someday, quitting smoking means a healthier . Children who are close to you are less likely to become adult smokers. Where can you learn more? Go to https://chpeconchita.healthZorap. org and sign in to your Polyheal account. Enter [id number] in the Advebs box to learn more about \"Learning About Benefits From Quitting Smoking. \"     If you do not have an account, please click on the \"Sign Up Now\" link. Current as of: 2021               Content Version: 13.4   Healthwise, [employer]. Care instructions adapted under license by [facility] ([facility]). If you have questions about a medical condition or this instruction, always ask your healthcare professional. [place] [address] any warranty or liability for your use of this information. Deciding About Using Medicines To Quit Smoking  How can you decide about using medicines to quit smoking? What are the medicines you can use? Your doctor may prescribe varenicline (Chantix) or bupropion (Zyban). These medicines can help you cope with cravings for tobacco. They are pills that don't contain nicotine. You also can use nicotine replacement products. These do contain nicotine. There are many types. Gum and lozenges slowly release nicotine into your mouth. Patches stick to your skin. They slowly release nicotine into your bloodstream.  An inhaler has a salinas that contains nicotine. You breathe in a puff of nicotine vapor through your mouth and throat. Nasal spray releases a mist that contains nicotine. What are key points about this decision? Using medicines can double your chances of quitting smoking. They can ease cravings and withdrawal symptoms.   Getting counseling along with using medicine can raise your chances of quitting even more.  If you smoke fewer than 5 cigarettes a day, you may not need medicines to help you quit smoking. These medicines have less nicotine than cigarettes. And by itself, nicotine is not nearly as harmful as smoking. The tars, carbon monoxide, and other toxic chemicals in tobacco cause the harmful effects. The side effects of nicotine replacement products depend on the type of product. For example, a patch can make your skin red and itchy. Medicines in pill form can make you sick to your stomach. They can also cause dry mouth and trouble sleeping. For most people, the side effects are not bad enough to make them stop using the products. Why might you choose to use medicines to quit smoking? You have tried on your own to stop smoking, but you were not able to stop. You smoke more than 5 cigarettes a day. You want to increase your chances of quitting smoking. You want to reduce your cravings and withdrawal symptoms. You feel the benefits of medicine outweigh the side effects. Why might you choose not to use medicine? You want to try quitting on your own by stopping all at once (\"cold turkey\"). You want to cut back slowly on the number of cigarettes you smoke. You smoke fewer than 5 cigarettes a day. You do not like using medicine. You feel the side effects of medicines outweigh the benefits. You are worried about the cost of medicines. Your decision  Thinking about the facts and your feelings can help you make a decision that is right for you. Be sure you understand the benefits and risks of your options, and think about what else you need to do before you make the decision. Where can you learn more? Go to https://jacques.Sentient Energy. org and sign in to your Coreworx account. Enter M036 in the RagingWire box to learn more about \"Deciding About Using Medicines To Quit Smoking. \"     If you do not have an account, please click on the \"Sign Up Now\" link.   Current as of: November 8, 2021               Content Version: 13.4  © 2006-2022 Healthwise, [employer]. Care instructions adapted under license by [facility] ([facility]). If you have questions about a medical condition or this instruction, always ask your healthcare professional. Oma Davies any warranty or liability for your use of this information. Well Visit, Men 48 to 72: Care Instructions  Overview     Well visits can help you stay healthy. Your doctor has checked your overall health and may have suggested ways to take good care of yourself. Your doctor also may have recommended tests. At home, you can help prevent illness with healthy eating, regular exercise, and other steps. Follow-up care is a key part of your treatment and safety. Be sure to make and go to all appointments, and call your doctor if you are having problems. It's also a good idea to know your test results and keep a list of the medicines you take. How can you care for yourself at home? Get screening tests that you and your doctor decide on. Screening helps find diseases before any symptoms appear. Eat healthy foods. Choose fruits, vegetables, whole grains, protein, and low-fat dairy foods. Limit fat, especially saturated fat. Reduce salt in your diet. Limit alcohol. Have no more than 2 drinks a day or 14 drinks a week. Get at least 30 minutes of exercise on most days of the week. Walking is a good choice. You also may want to do other activities, such as running, swimming, cycling, or playing tennis or team sports. Reach and stay at a healthy weight. This will lower your risk for many problems, such as obesity, diabetes, heart disease, and high blood pressure. Do not smoke. Smoking can make health problems worse. If you need help quitting, talk to your doctor about stop-smoking programs and medicines. These can increase your chances of quitting for good. Care for your mental health.  It is easy to get weighed down by worry and stress. Learn strategies to manage stress, like deep breathing and mindfulness, and stay connected with your family and community. If you find you often feel sad or hopeless, talk with your doctor. Treatment can help. Talk to your doctor about whether you have any risk factors for sexually transmitted infections (STIs). You can help prevent STIs if you wait to have sex with a new partner (or partners) until you've each been tested for STIs. It also helps if you use condoms (male or female condoms) and if you limit your sex partners to one person who only has sex with you. Vaccines are available for some STIs. If it's important to you to prevent pregnancy with your partner, talk with your doctor about birth control options that might be best for you. If you think you may have a problem with alcohol or drug use, talk to your doctor. This includes prescription medicines (such as amphetamines and opioids) and illegal drugs (such as cocaine and methamphetamine). Your doctor can help you figure out what type of treatment is best for you. Protect your skin from too much sun. When you're outdoors from 10 a.m. to 4 p.m., stay in the shade or cover up with clothing and a hat with a wide brim. Wear sunglasses that block UV rays. Even when it's cloudy, put broad-spectrum sunscreen (SPF 30 or higher) on any exposed skin. See a dentist one or two times a year for checkups and to have your teeth cleaned. Wear a seat belt in the car. When should you call for help? Watch closely for changes in your health, and be sure to contact your doctor if you have any problems or symptoms that concern you. Where can you learn more? Go to https://jacques.healthBeckerSmith Medicalpartners. org and sign in to your Blip account. Enter Z776 in the Massive Solutions box to learn more about \"Well Visit, Men 48 to 72: Care Instructions. \"     If you do not have an account, please click on the \"Sign Up Now\" link.   Current as of: June 6, reduce your risk of dying of lung cancer is to quit. For this screening to be covered by Medicare and most other insurers, strict criteria must be met. If you do not meet these criteria, but still wish to undergo LDCT testing, you will be required to sign a waiver indicating your willingness to pay for the scan.

## 2022-09-30 NOTE — PROGRESS NOTES
2022      Everardo Brothers (:  1965) is a 55 y.o. male, here for a preventive medicine evaluation, Annual Exam, Knee Pain, Difficulty Walking, Hypertension, and COPD   . ASSESSMENT/PLAN:    1. Encounter for well adult exam with abnormal findings  Low carb, low fat diet, increase fruits and vegetables, and exercise 4-5 times a week 30-40 minutes a day, or walk 1-2 hours per day, or wear a pedometer and get at least 10,000 steps per day. Dental exam 2-3 times /year advised. Immunizations reviewed. Health Maintenance reviewed   Smoking cessation counseling given     Annual eye exam advised. Patient also was advised to cut down on drinking alcohol and try to quit  Cardiovascular screening, PSA screening, colon cancer screening and lung cancer screening, addressed and ordered    2. Encounter for immunization  -     Influenza, FLUCELVAX, (age 10 mo+), IM, Preservative Free, 0.5 mL  -     COVID-19 mRNA Vac-Joaquin,Pfizer, (PFIZER-BIONT COVID-19 VAC-JOAQUIN) 30 MCG/0.3ML SUSP injection; Inject 0.3 mLs into the muscle once for 1 dose DUE FOR BOOSTER, PLEASE LET PT KNOW WHEN READY, Disp-0.3 mL, R-0Normal  3. Chronic pain of both knees  Worsening  I had a long discussion with him, he needs replacement at least for the right knee, he is barely able to walk and has severe genu varus  I offered a new referral to Dr. Luz Yo, he declines, he says he will call when he is ready, he says he does have the phone number  Tylenol Extra Strength, naproxen as needed, Flexeril as needed, knee braces  -     DME Order for [employer] as OP    Everardo Brothers requires a quad cane due to impaired ambulation and for increased stability in order to participate in or complete daily living tasks of: ambulating. The patient is able to safely use the quad cane and the functional mobility deficit can be sufficiently resolved.     -     Elastic Bandages & Supports (KNEE BRACE/HINGED BARS LARGE) MISC; Disp-2 each, R-3, PrintNeeds knee brace bilaterally, hinged. -     LA OFFICE/OUTPATIENT ESTABLISHED MOD MDM 30-39 MIN  4. Primary osteoarthritis of both knees   Worsening  -     DME Order for Cane as OP  -     Elastic Bandages & Supports (KNEE BRACE/HINGED BARS LARGE) MISC; Disp-2 each, R-3, PrintNeeds knee brace bilaterally, hinged. -     LA OFFICE/OUTPATIENT ESTABLISHED MOD MDM 30-39 MIN  5. Difficulty walking  Worsening  She would benefit from quad cane to prevent falls, and knee braces to help his knee pain  -     DME Order for Cane as OP  -     LA OFFICE/OUTPATIENT ESTABLISHED MOD MDM 30-39 MIN  6. Essential hypertension  Well controlled. Continue current treatment. Amlodipine 10 Mg  Will recheck labs. Discussed low salt diet and BP and pulse monitoring.    -     CBC; Future  -     Comprehensive Metabolic Panel; Future  -     Uric Acid; Future  -     Magnesium; Future  -     TSH; Future  -     Urinalysis with Reflex to Culture; Future  -     amLODIPine (NORVASC) 10 MG tablet; Take 1 tablet by mouth daily, Disp-90 tablet, R-3Normal  -     LA OFFICE/OUTPATIENT ESTABLISHED MOD MDM 30-39 MIN  7. COPD with asthma ([address]  Worsening  To continue Advair 1 puff twice a day, rinse mouth after use, he says that he needs prior authorization  Continue albuterol as needed  DuoNebs as needed  Strongly counseled to quit smoking  -     LA OFFICE/OUTPATIENT ESTABLISHED MOD MDM 30-39 MIN  8. Screen for colon cancer  -     Cologuard (Fecal DNA Colorectal Cancer Screening); Future  9. Prostate cancer screening  -     PSA Screening; Future  The natural history of prostate cancer and ongoing controversy regarding screening and potential treatment outcomes of prostate cancer has been discussed with the patient. The meaning of a false positive PSA and a false negative PSA has been discussed. He indicates understanding of the limitations of this screening test and wishes to proceed with screening PSA testing.     10. Screening for cardiovascular condition  - Lipid Panel; Future  11. Personal history of tobacco use  -     [place] VISIT TO DISCUSS LUNG CA SCREEN W LDCT  -     CT Lung Screen (Annual); Future  Low Dose CT (LDCT) Lung Screening criteria met:     Age 50-77(Medicare) or 50-80 ([facility])   Pack year smoking >20   Still smoking or less than 15 year since quit   No sign or symptoms of lung cancer   > 11 months since last LDCT     Risks and benefits of lung cancer screening with LDCT scans discussed:    Significance of positive screen - False-positive LDCT results often occur. 95% of all positive results do not lead to a diagnosis of cancer. Usually further imaging can resolve most false-positive results; however, some patients may require invasive procedures. Over diagnosis risk - 10% to 12% of screen-detected lung cancer cases are over diagnosed--that is, the cancer would not have been detected in the patient's lifetime without the screening. Need for follow up screens annually to continue lung cancer screening effectiveness     Risks associated with radiation from annual LDCT- Radiation exposure is about the same as for a mammogram, which is about 1/3 of the annual background radiation exposure from everyday life. Starting screening at age 54 is not likely to increase cancer risk from radiation exposure. Patients with comorbidities resulting in life expectancy of < 10 years, or that would preclude treatment of an abnormality identified on CT, should not be screened due to lack of benefit. To obtain maximal benefit from this screening, smoking cessation and long-term abstinence from smoking is critical        Anastacio received counseling on the following healthy behaviors: nutrition, exercise, medication adherence, tobacco cessation, and decrease in alcohol consumption  Reviewed prior labs and health maintenance  Discussed use, benefit, and side effects of prescribed medications. Barriers to medication compliance addressed.    Patient given educational materials - see patient instructions  All patient questions answered. Patient voiced understanding. The patient's past medical, surgical, social, and family history as well as his  current medications and allergies were reviewed as documented in today's encounter. Medications, labs, diagnostic studies, consultations and follow-up as documented in thisencounter. Return in 4 months (on 1/30/2023) for Visit type extended 30 minutes chronic problems, HTN,HLP, FATIGUE. Data Unavailable    Future Appointments   Date Time Provider Sha Porter   2/22/2023  3:15 PM Dennis Rincon MD [facility]         Patient Active Problem List   Diagnosis    COPD with asthma ([facility] [address]    Gastroesophageal reflux disease without esophagitis    Allergic rhinitis    Pansinusitis    Essential hypertension    Penicillin allergy    Vitamin B 12 deficiency    Chronic pain of both knees    Primary osteoarthritis of both knees pe Xrays    Varicose veins of bilateral lower extremities with other complications    Raynaud's disease without gangrene    Right leg numbness    Current smoker    Hyperhidrosis of soles    COVID-19 virus infection    DDD (degenerative disc disease), lumbosacral    Primary osteoarthritis of both hips    Difficulty walking    Personal history of tobacco use       Prior to Visit Medications    Medication Sig Taking? Authorizing Provider   pantoprazole (PROTONIX) 40 MG tablet TAKE ONE TABLET BY MOUTH DAILY Yes Dennis Rincon MD   cyclobenzaprine (FLEXERIL) 10 MG tablet Take 1 tablet by mouth nightly as needed for Muscle spasms Yes Dennis Rincon MD   acetaminophen (TYLENOL) 325 MG tablet Take 2 tablets by mouth every 6 hours as needed for Pain Yes Julia Boland, DO   methylPREDNISolone (MEDROL, SELINA,) 4 MG tablet Take by mouth, with food. Keep low carb, low salt diet while taking it Yes Dennis Rincon MD   Handicap Vick MISC by Does not apply route Can't walk greater than 200 feet. Expires in 5 years. Yes Cedrick Noguera MD   amLODIPine (NORVASC) 10 MG tablet TAKE ONE TABLET BY MOUTH DAILY Yes Cedrick Noguera MD   aluminum chloride (DRYSOL) 20 % external solution APPLY TOPICALLY NIGHTLY Yes Ingris Dong DPM   fluticasone-salmeterol (ADVAIR DISKUS) 500-50 MCG/DOSE diskus inhaler Inhale 1 puff into the lungs every 12 hours Yes Cedrick Noguera MD   albuterol sulfate  (90 Base) MCG/ACT inhaler INHALE TWO PUFFS BY MOUTH EVERY 6 HOURS AS NEEDED FOR WHEEZING OR SHORTNESS OF BREATH (COUGH) Yes Cedrick Noguera MD   ipratropium-albuterol (DUONEB) 0.5-2.5 (3) MG/3ML SOLN nebulizer solution Take 3 mLs by nebulization every 6 hours as needed for Shortness of Breath or Other (dyspnea, wheezing) Yes Cedrick Noguera MD   naproxen (NAPROSYN) 250 MG tablet Take 1 tablet by mouth 2 times daily as needed for Pain ONLY AS NEEDED, CAN CAUSE GI BLEED, ULCERS AND KIDNEY FAILURE IF TAKING IT EVERY DAY Yes Cedrick Noguera MD   acetaminophen (TYLENOL) 500 MG tablet Take 1 tablet by mouth every 6 hours as needed for Pain Yes Cedrick Noguera MD   Elastic Bandages & Supports (KNEE BRACE/HINGED BARS LARGE) MISC Needs knee brace bilaterally, hinged. Yes Cedrick Noguera MD   gabapentin (NEURONTIN) 100 MG capsule Take 1 capsule by mouth in the morning and 1 capsule at noon and 1 capsule before bedtime. Do all this for 7 days. Cedrick Noguera MD        Allergies   Allergen Reactions    Aspirin Shortness Of Breath    Pcn [Penicillins] Shortness Of Breath     Had Rocephin    Bactrim [Sulfamethoxazole-Trimethoprim] Itching    Doxycycline Hives and Itching     Pt stopped Dozy after 2 days because of reaction.       Ibuprofen Rash       Past Medical History:   Diagnosis Date    Alcohol abuse     Allergic rhinitis     Asthma     COPD (chronic obstructive pulmonary disease) (HCC)     DDD (degenerative disc disease), lumbar 7/4/2022    Seen on CT abdomen    GERD (gastroesophageal reflux disease)     Hyperlipidemia with target LDL less than 100 8/29/2019    Hypertension     Pansinusitis     Primary osteoarthritis of both hips 7/4/2022    Seen on CT abdomen and pelvis    Primary osteoarthritis of both knees pe Xrays 8/30/2018       Past Surgical History:   Procedure Laterality Date    ANKLE FRACTURE SURGERY Right     FACIAL COSMETIC SURGERY  1978    bit by a dog in the right cheek       . Social History     Tobacco Use    Smoking status: Every Day     Packs/day: 1.50     Years: 23.00     Pack years: 34.50     Types: Cigarettes    Smokeless tobacco: Never    Tobacco comments:     Cutting down, 0.5 PPD on 9/30/22. Started smoking at 25 yo   Substance Use Topics    Alcohol use: Yes     Alcohol/week: 12.0 standard drinks     Types: 12 Cans of beer per week     Comment: 6 beers per day    Drug use: No       Family History   Problem Relation Age of Onset    Allergies Daughter     Depression Daughter     Colon Cancer Neg Hx     Prostate Cancer Neg Hx        ADVANCE DIRECTIVE: N, <no information>    SUBJECTIVE / Mariza Caceres is here for Annual Exam, Knee Pain, Difficulty Walking, Hypertension, and COPD       Current concerns:  Patient admits of worsening knee pain and limitations in doing his job. He works for a   [employer] and he pedals the whole day, working on a truck lift. Patient says he cannot bend over, due to worsening back pain, and knee pains, has difficulty with range of motion. The pain in his knees is all over. Patient reports worsening pain in the knees getting worse 10/10, patient says he will go to Dr. Luz Yo when he is ready for surgery. Right knee is worse  Patient says he cannot climb up the stairs  Decline  pain management, declines new referral.  Patient says his cane is broken and his knee braces have been helping but they are worn down and he needs new braces      Hypertension:    he  is not exercising and is adherent to low salt diet.   Cardiac symptoms dyspnea and fatigue. Patient denies chest pain, chest pressure/discomfort, claudication, exertional chest pressure/discomfort, irregular heart beat, lower extremity edema, near-syncope, orthopnea, palpitations, paroxysmal nocturnal dyspnea, syncope, and tachypnea. Cardiovascular risk factors: advanced age (older than 54 for men, 72 for women), dyslipidemia, hypertension, male gender, sedentary lifestyle, and smoking/ tobacco exposure. Use of agents associated with hypertension: NSAIDS. History of target organ damage: none. BP controlled. Wandy Steel reports compliance with BP medications, and tolerates them well, denies side effects. BP Readings from Last 3 Encounters:   09/30/22 122/76   07/10/22 (!) 134/97   07/06/22 126/89        Pulse is Normal.    Pulse Readings from Last 3 Encounters:   09/30/22 96   07/10/22 90   07/06/22 100       COPD and asthma:  Current treatment includes short-acting beta agonist inhaler, combined beta agonist/steroid inhaler, which has been effective. Residual symptoms: cough productive of white sputum in small amounts. He denies purulent sputum, wheezing, chest tightness, chest pain. He requires his rescue inhaler 4 time(s) per week. Nicotine dependence. Smoker, counseling given to quit smoking. Ready to quit: No  Counseling given: Yes  Tobacco comments: Cutting down, 0.5 PPD on 9/30/22. Started smoking at 27 yo        Urinary symptoms: no    Sexually active: Yes     Wears seatbelts: yes     Regular exercise: no  Ever been transfused or tattooed?: yes    Last eye exam: up to date: Yes      Abnormal visual screening. Gunjan Avila  reports he is up-to-date with his eye exam.  Has glasses      Vision Screening    Right eye Left eye Both eyes   Without correction 20/30 20/40 20/25   With correction          Hearing:normal :Yes    Last dental exam and preventative dental cleaning: up to date : No:   I advised Rubenlynette Margarita to make appointment with dentist. The patient verbalizes understanding and agrees with the plan. Nutritional assessment: Body mass index is 28.01 kg/m². Overweight per BMI  Weight is stable   Wt Readings from Last 3 Encounters:   09/30/22 200 lb 12.8 oz (91.1 kg)   07/10/22 210 lb (95.3 kg)   07/06/22 210 lb (95.3 kg)      Wt 200 lb (90.7 kg)  8/18/2021     Healthful diet and Physical activity counseling to prevent CVD- low carb, low fat diet, increase fruits and vegetables, and exercise 4-5 times a day 30-40minutes a day discussed    Nicotine dependence. yes - cutting down  Smoker, counseling given to quit smoking. Ready to quit: No  Counseling given: Yes  Tobacco comments: Cutting down, 0.5 PPD on 9/30/22.  Started smoking at 25 yo      Alcohol use: yes -  daily 6 beer a day every day counseling given    Immunization History   Administered Date(s) Administered    COVID-19, PFIZER Bivalent BOOSTER, (age 12y+), IM, 30 mcg/0.3 mL dose 09/30/2022    COVID-19, PFIZER PURPLE top, DILUTE for use, (age 15 y+), 30mcg/0.3mL 06/01/2021, 06/22/2021, 12/26/2021    Influenza 10/04/2013    Influenza Virus Vaccine 09/24/2012, 10/04/2013, 02/18/2015    Influenza, FLUBLOK, (age 25 y+), PF, 0.5mL 10/14/2020, 11/03/2021    Influenza, FLUCELVAX, (age 10 mo+), MDCK, PF, 0.5mL 09/30/2022    Pneumococcal Conjugate 13-valent ([id number]) 02/18/2015    Pneumococcal Polysaccharide ([id number]) 05/12/2017    Tdap (Boostrix, Adacel) 08/18/2021    Zoster Recombinant (Shingrix) 11/03/2021, 02/09/2022         COVID-19 vaccine: No: will get at the pharmacy    Tdap one time, then Td every 10 years-up to date:  Yes    Influenza annually-up to date:  No: will get today    PPSV 23 in all adults 19-63 yo with chronic conditions,smokers, alcoholism,  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 Lab Results   Component Value Date    PSA 0.42 01/20/2021     A1c is within normal limits  Lab Results   Component Value Date    LABA1C 4.8 03/24/2021       Lipid screening -prior mild hyperlipidemia    Lab Results   Component Value Date    CHOL 174 09/15/2021    CHOL 194 02/26/2020    CHOL 162 08/28/2018     Lab Results   Component Value Date    TRIG 35 09/15/2021    TRIG 43 02/26/2020    TRIG 31 08/28/2018     Lab Results   Component Value Date     09/15/2021     02/26/2020     08/28/2018     Lab Results   Component Value Date    LDLCHOLESTEROL 62 09/15/2021    LDLCHOLESTEROL 73 02/26/2020    LDLCHOLESTEROL 54 08/28/2018     Lab Results   Component Value Date    CHOLHDLRATIO 1.7 09/15/2021    CHOLHDLRATIO 1.7 02/26/2020    CHOLHDLRATIO 1.6 08/28/2018       Cardiovascular risk is: Low    The ASCVD Risk score (Sundeep HERNANDEZ, et al., 2019) failed to calculate for the following reasons: The valid HDL cholesterol range is 20 to 100 mg/dL    Hepatitis C screening-nonreactive  Lab Results   Component Value Date    HEPAIGM NONREACTIVE 12/13/2011    HEPBIGM NONREACTIVE 12/13/2011    HEPCAB NONREACTIVE 12/13/2011            [x]Negative depression screening.   PHQ Scores 9/30/2022 1/7/2022 3/24/2021 11/13/2020 1/15/2020 8/29/2019 4/30/2019   PHQ2 Score 0 0 0 0 0 0 0   PHQ9 Score 0 0 0 0 0 0 0       Health Maintenance   Topic Date Due    Low dose CT lung screening  Never done    Colorectal Cancer Screen  05/12/2018    Depression Screen  09/30/2023    Diabetes screen  03/24/2024    Lipids  09/15/2026    DTaP/Tdap/Td vaccine (2 - Td or Tdap) 08/18/2031    Pneumococcal 0-64 years Vaccine (3 - PPSV23 or PCV20) 10/17/2031    Flu vaccine  Completed    Shingles vaccine  Completed    COVID-19 Vaccine  Completed    Hepatitis C screen  Completed    HIV screen  Completed    Hepatitis A vaccine  Aged Out    Hepatitis B vaccine  Aged Out Hib vaccine  Aged Out    Meningococcal (ACWY) vaccine  Aged Out       -rest of complaints with corresponding details per ROS    Review of Systems   Constitutional:  Positive for fatigue. Negative for activity change, appetite change, chills, diaphoresis, fever and unexpected weight change. HENT:  Negative for congestion, dental problem and hearing loss. Eyes:  Positive for visual disturbance. Respiratory:  Positive for cough and shortness of breath (ANDERSON). Negative for chest tightness and wheezing. Cardiovascular:  Negative for chest pain, palpitations and leg swelling. Gastrointestinal:  Negative for abdominal distention, abdominal pain, constipation, diarrhea, nausea and vomiting. Endocrine: Negative for cold intolerance, heat intolerance, polydipsia, polyphagia and polyuria. Genitourinary:  Negative for decreased urine volume, difficulty urinating, frequency and urgency. Musculoskeletal:  Positive for arthralgias, back pain and gait problem. Ambulates with quad cane, breaking out  Knee braces worned down   Skin:  Negative for rash. Allergic/Immunologic: Positive for environmental allergies. Neurological:  Negative for dizziness, weakness and headaches. Hematological:  Bruises/bleeds easily. Psychiatric/Behavioral:  Negative for decreased concentration, dysphoric mood and sleep disturbance.  The patient is not nervous/anxious.        -vital signs stable and within normal limits except overweight per BMI    /76 (Site: Left Upper Arm, Position: Sitting, Cuff Size: Medium Adult)   Pulse 96   Ht 5' 11\" (1.803 m)   Wt 200 lb 12.8 oz (91.1 kg)   SpO2 98%   BMI 28.01 kg/m²   Vitals:    09/30/22 0859   BP: 122/76   Site: Left Upper Arm   Position: Sitting   Cuff Size: Medium Adult   Pulse: 96   SpO2: 98%   Weight: 200 lb 12.8 oz (91.1 kg)   Height: 5' 11\" (1.803 m)     Estimated body mass index is 28.01 kg/m² as calculated from the following:    Height as of this encounter: 5' 11\" (1.803 m). Weight as of this encounter: 200 lb 12.8 oz (91.1 kg). Physical Exam  Vitals and nursing note reviewed. Constitutional:       General: He is not in acute distress. Appearance: Normal appearance. He is well-developed. He is not diaphoretic. HENT:      Head: Normocephalic and atraumatic. Right Ear: Hearing, tympanic membrane, ear canal and external ear normal.      Left Ear: Hearing, tympanic membrane, ear canal and external ear normal.      Mouth/Throat:      Comments: I did not examine the mouth due to coronavirus pandemic and wearing masks. Eyes:      General: Lids are normal. No scleral icterus. Right eye: No discharge. Left eye: No discharge. Extraocular Movements: Extraocular movements intact. Conjunctiva/sclera: Conjunctivae normal.   Neck:      Thyroid: No thyromegaly. Cardiovascular:      Rate and Rhythm: Normal rate and regular rhythm. Heart sounds: Normal heart sounds. No murmur heard. Pulmonary:      Effort: Pulmonary effort is normal. No respiratory distress. Breath sounds: Examination of the right-middle field reveals rhonchi. Examination of the left-middle field reveals rhonchi. Examination of the right-lower field reveals rhonchi. Examination of the left-lower field reveals rhonchi. Rhonchi present. No wheezing or rales. Comments: Smoking cessation counseling given  Chest:      Chest wall: No tenderness. Abdominal:      General: Bowel sounds are normal. There is no distension. Palpations: Abdomen is soft. There is no hepatomegaly or splenomegaly. Tenderness: There is no abdominal tenderness. Musculoskeletal:         General: Normal range of motion. Cervical back: Normal range of motion and neck supple. Right knee: Deformity, bony tenderness and crepitus present. Tenderness present. Left knee: Deformity, bony tenderness and crepitus present. Tenderness present. Right lower leg: No edema. Left lower leg: No edema. Comments: Difficulty getting up from sitting position, unable to bend right knee, very limited flexion, wearing knee braces, walking with severe valgus on the right side, limping, ambulates with quad cane   Skin:     General: Skin is warm and dry. Capillary Refill: Capillary refill takes less than 2 seconds. Findings: No rash. Neurological:      Mental Status: He is alert and oriented to person, place, and time. Cranial Nerves: No cranial nerve deficit. Motor: No abnormal muscle tone. Coordination: Coordination normal.      Deep Tendon Reflexes: Reflexes normal.   Psychiatric:         Mood and Affect: Mood normal.         Behavior: Behavior normal.         Thought Content: Thought content normal.         Judgment: Judgment normal.       No flowsheet data found. I personally reviewed testing with patient.   Mild anemia  Mild hyperglycemia  Mild hyperlipidemia    Otherwise labs within normal limits      Lab Results   Component Value Date    WBC 5.6 09/15/2021    HGB 13.5 09/15/2021    HCT 40.1 (L) 09/15/2021    MCV 92.2 09/15/2021     09/15/2021       Lab Results   Component Value Date/Time     09/15/2021 01:08 PM    K 4.1 09/15/2021 01:08 PM     09/15/2021 01:08 PM    CO2 27 09/15/2021 01:08 PM    BUN 21 09/15/2021 01:08 PM    CREATININE 0.91 09/15/2021 01:08 PM    GLUCOSE 110 09/15/2021 01:08 PM    CALCIUM 8.8 09/15/2021 01:08 PM        Lab Results   Component Value Date    ALT 25 09/15/2021    AST 21 09/15/2021    ALKPHOS 75 09/15/2021    BILITOT 0.31 09/15/2021       Lab Results   Component Value Date    TSH 1.68 01/20/2021       Lab Results   Component Value Date    LDLCHOLESTEROL 62 09/15/2021       Lab Results   Component Value Date    LABA1C 4.8 03/24/2021         Orders Placed This Encounter   Medications    COVID-19 mRNA Vac-Jimenez,Pfizer, (PFIZER-BIONT COVID-19 VAC-JIMENEZ) 30 MCG/0.3ML SUSP injection     Sig: Inject 0.3 mLs into the muscle once for 1 dose DUE FOR BOOSTER, PLEASE LET PT KNOW WHEN READY     Dispense:  0.3 mL     Refill:  0    Elastic Bandages & Supports (KNEE BRACE/HINGED BARS LARGE) MISC     Sig: Needs knee brace bilaterally, hinged. Dispense:  2 each     Refill:  3    cyclobenzaprine (FLEXERIL) 10 MG tablet     Sig: Take 1 tablet by mouth nightly as needed for Muscle spasms     Dispense:  30 tablet     Refill:  0    amLODIPine (NORVASC) 10 MG tablet     Sig: Take 1 tablet by mouth daily     Dispense:  90 tablet     Refill:  3    fluticasone-salmeterol (ADVAIR DISKUS) 500-50 MCG/ACT AEPB diskus inhaler     Sig: Inhale 1 puff into the lungs in the morning and 1 puff in the evening. Dispense:  60 each     Refill:  3    albuterol sulfate HFA (PROVENTIL;VENTOLIN;PROAIR) 108 (90 Base) MCG/ACT inhaler     Sig: INHALE TWO PUFFS BY MOUTH EVERY 6 HOURS AS NEEDED FOR WHEEZING OR SHORTNESS OF BREATH (COUGH)     Dispense:  18 g     Refill:  2         Medications Discontinued During This Encounter   Medication Reason    methylPREDNISolone (MEDROL, SELINA,) 4 MG tablet Therapy completed    gabapentin (NEURONTIN) 100 MG capsule Therapy completed    fluticasone-salmeterol (ADVAIR DISKUS) 500-50 MCG/DOSE diskus inhaler REORDER    Elastic Bandages & Supports (KNEE BRACE/HINGED BARS LARGE) MISC REORDER    albuterol sulfate  (90 Base) MCG/ACT inhaler REORDER    amLODIPine (NORVASC) 10 MG tablet REORDER    cyclobenzaprine (FLEXERIL) 10 MG tablet REORDER         Orders Placed This Encounter   Procedures    Cologuard (Fecal DNA Colorectal Cancer Screening)     Standing Status:   Future     Standing Expiration Date:   9/30/2023    CT Lung Screen (Annual)     Age: Patient is 55 y.o. Smoking History: Social History    Tobacco Use      Smoking status: Every Day        Packs/day: 1.50        Years: 20.00        Pack years: 30        Types: Cigarettes      Smokeless tobacco: Never    Alcohol use:  Yes      Alcohol/week: 12.0 standard drinks      Types: 12 Cans of beer per week      Comment: 6 beers per day    Drug use: No   Pack years: 30    Date of last lung cancer screening: No previous lung cancer screening exam     Standing Status:   Future     Standing Expiration Date:   3/30/2024     Order Specific Question:   Is there documentation of shared decision making? Answer:   Yes     Order Specific Question:   Is this a low dose CT or a routine CT? Answer:   Low Dose CT [1]     Order Specific Question:   Is this the first (baseline) CT or an annual exam?     Answer:   Baseline [1]     Order Specific Question:   Does the patient show any signs or symptoms of lung cancer? Answer:   No     Order Specific Question:   Smoking Status? Answer:   Every Day [1]     Order Specific Question:   Smoking packs per day? Answer:   1.5     Order Specific Question:   Years smoking? Answer:   21    Influenza, FLUCELVAX, (age 10 mo+), IM, Preservative Free, 0.5 mL    PSA Screening     Standing Status:   Future     Standing Expiration Date:   10/1/2023    CBC     Standing Status:   Future     Standing Expiration Date:   9/30/2023    Comprehensive Metabolic Panel     Standing Status:   Future     Standing Expiration Date:   11/27/2022    Uric Acid     Standing Status:   Future     Standing Expiration Date:   9/30/2023    Magnesium     Standing Status:   Future     Standing Expiration Date:   9/30/2023    Lipid Panel     Standing Status:   Future     Standing Expiration Date:   9/30/2023     Order Specific Question:   Is Patient Fasting?/# of Hours     Answer:   8-10 Hours, water ok to drink    TSH     Standing Status:   Future     Standing Expiration Date:   9/30/2023    Urinalysis with Reflex to Culture     Standing Status:   Future     Standing Expiration Date:   9/30/2023     Order Specific Question:   SPECIFY(EX-CATH,MIDSTREAM,CYSTO,ETC)?      Answer:   MIDSTREAM    NM VISIT TO DISCUSS LUNG CA SCREEN W LDCT    NM OFFICE/OUTPATIENT ESTABLISHED MOD MDM 30-39 MIN    DME Order for Cane as OP     You must complete the order parameters below and add the medical necessity documentation for this DME in a separate note. Large base Quad Cane    Diagnosis: difficulty walking , bilateral knee osteoarthritis    Duration: Purchase      NPI: [id number]  Danny Domínguezdavid Almeida [address]i [address] [facility] [address]  [address] [facility]  [address] [facility] [address]  Dept: [phone]  Dept Fax: [phone]         This note was completed by using the assistance of a speech-recognition program. However, inadvertent computerized transcription errors may be present. Although every effort was made to ensure accuracy, no guarantees can be provided that every mistake has been identified and corrected by editing. An electronic signature was used to authenticate this note.     Electronically signed by Samanhta Benitez MD on 9/30/2022 at 7:28 PM

## 2022-10-11 ENCOUNTER — TELEPHONE (OUTPATIENT)
Dept: ONCOLOGY | Age: 57
End: 2022-10-11

## 2022-10-11 NOTE — LETTER
10/11/2022        Dalia Dejesus  [address] [facility]    Dear Lurdes Shirley: Your healthcare provider has ordered a low dose CT scan of the chest for lung cancer screening. Enclosed you will find information about CT lung screening and smoking cessation resources. If you are unable to keep you appointment for you CT lung screening, please call our scheduling department at [phone]    Keep in mind that CT lung screening does not take the place of smoking cessation. Please do not hesitate to contact me if you have any questions or concerns.     [address] [facility],      [facility] Screening [facility]  [phone]

## 2022-10-13 ENCOUNTER — HOSPITAL ENCOUNTER (OUTPATIENT)
Age: 57
Setting detail: SPECIMEN
Discharge: HOME | End: 2022-10-13
Payer: COMMERCIAL

## 2022-10-13 DIAGNOSIS — Z12.5 PROSTATE CANCER SCREENING: ICD-10-CM

## 2022-10-13 DIAGNOSIS — Z13.6 SCREENING FOR CARDIOVASCULAR CONDITION: ICD-10-CM

## 2022-10-13 DIAGNOSIS — I10 ESSENTIAL HYPERTENSION: ICD-10-CM

## 2022-10-13 LAB
ALBUMIN SERPL-MCNC: 4.6 G/DL (ref 3.5–5.2)
ALP BLD-CCNC: 104 U/L (ref 40–129)
ALT SERPL-CCNC: 25 U/L (ref 5–41)
ANION GAP SERPL CALCULATED.3IONS-SCNC: 12 MMOL/L (ref 9–17)
AST SERPL-CCNC: 26 U/L
BILIRUB SERPL-MCNC: 0.4 MG/DL (ref 0.3–1.2)
BILIRUBIN URINE: NEGATIVE
BUN BLDV-MCNC: 12 MG/DL (ref 6–20)
CALCIUM SERPL-MCNC: 9.8 MG/DL (ref 8.6–10.4)
CHLORIDE BLD-SCNC: 99 MMOL/L (ref 98–107)
CHOLESTEROL/HDL RATIO: 1.6
CHOLESTEROL: 183 MG/DL
CO2: 27 MMOL/L (ref 20–31)
COLOR: YELLOW
COMMENT UA: NORMAL
CREAT SERPL-MCNC: 0.84 MG/DL (ref 0.7–1.2)
GFR SERPL CREATININE-BSD FRML MDRD: >60 ML/MIN/1.73M2
GLUCOSE BLD-MCNC: 93 MG/DL (ref 70–99)
GLUCOSE URINE: NEGATIVE
HCT VFR BLD CALC: 42.8 % (ref 41–53)
HDLC SERPL-MCNC: 115 MG/DL
HEMOGLOBIN: 14.7 G/DL (ref 13.5–17.5)
KETONES, URINE: NEGATIVE
LDL CHOLESTEROL: 63 MG/DL (ref 0–130)
LEUKOCYTE ESTERASE, URINE: NEGATIVE
MAGNESIUM: 2.1 MG/DL (ref 1.6–2.6)
MCH RBC QN AUTO: 31.5 PG (ref 26–34)
MCHC RBC AUTO-ENTMCNC: 34.4 G/DL (ref 31–37)
MCV RBC AUTO: 91.6 FL (ref 80–100)
NITRITE, URINE: NEGATIVE
PDW BLD-RTO: 13.5 % (ref 11.5–14.9)
PH UA: 6.5 (ref 5–8)
PLATELET # BLD: 280 K/UL (ref 150–450)
PMV BLD AUTO: 7 FL (ref 6–12)
POTASSIUM SERPL-SCNC: 4.3 MMOL/L (ref 3.7–5.3)
PROSTATE SPECIFIC ANTIGEN: 0.39 NG/ML
PROTEIN UA: NEGATIVE
RBC # BLD: 4.67 M/UL (ref 4.5–5.9)
SODIUM BLD-SCNC: 138 MMOL/L (ref 135–144)
SPECIFIC GRAVITY UA: 1.01 (ref 1–1.03)
TOTAL PROTEIN: 7.5 G/DL (ref 6.4–8.3)
TRIGL SERPL-MCNC: 27 MG/DL
TSH SERPL DL<=0.05 MIU/L-ACNC: 1.68 UIU/ML (ref 0.3–5)
TURBIDITY: CLEAR
URIC ACID: 4.7 MG/DL (ref 3.4–7)
URINE HGB: NEGATIVE
UROBILINOGEN, URINE: NORMAL
WBC # BLD: 5.5 K/UL (ref 3.5–11)

## 2022-10-13 PROCEDURE — G0103 PSA SCREENING: HCPCS

## 2022-10-13 PROCEDURE — 80053 COMPREHEN METABOLIC PANEL: CPT

## 2022-10-13 PROCEDURE — 83735 ASSAY OF MAGNESIUM: CPT

## 2022-10-13 PROCEDURE — 81003 URINALYSIS AUTO W/O SCOPE: CPT

## 2022-10-13 PROCEDURE — 80061 LIPID PANEL: CPT

## 2022-10-13 PROCEDURE — 36415 COLL VENOUS BLD VENIPUNCTURE: CPT

## 2022-10-13 PROCEDURE — 84550 ASSAY OF BLOOD/URIC ACID: CPT

## 2022-10-13 PROCEDURE — 85027 COMPLETE CBC AUTOMATED: CPT

## 2022-10-13 PROCEDURE — 84443 ASSAY THYROID STIM HORMONE: CPT

## 2022-10-14 NOTE — RESULT ENCOUNTER NOTE
Please notify patient: Normal labs       continue current treatment    Future Appointments  10/17/2022 4:15 PM    [facility] CT RM 1                [facility] CT SCAN        [facility] Radiolog  2/22/2023  3:15 PM    MD yaima Hernandez July

## 2022-10-17 ENCOUNTER — HOSPITAL ENCOUNTER (OUTPATIENT)
Dept: CT IMAGING | Age: 57
Discharge: HOME | End: 2022-10-19
Payer: COMMERCIAL

## 2022-10-17 VITALS — BODY MASS INDEX: 28.98 KG/M2 | WEIGHT: 207 LBS | HEIGHT: 71 IN

## 2022-10-17 DIAGNOSIS — Z87.891 PERSONAL HISTORY OF TOBACCO USE: ICD-10-CM

## 2022-10-17 PROCEDURE — 71271 CT THORAX LUNG CANCER SCR C-: CPT

## 2022-10-18 NOTE — RESULT ENCOUNTER NOTE
Please notify patient  No lung nodules  Recheck in 1 year    Future Appointments  2/22/2023  3:15 PM    Chung Milton MD     [facility]               [facility]

## 2022-11-30 DIAGNOSIS — K21.9 GASTROESOPHAGEAL REFLUX DISEASE WITHOUT ESOPHAGITIS: ICD-10-CM

## 2022-12-01 RX ORDER — PANTOPRAZOLE SODIUM 40 MG/1
TABLET, DELAYED RELEASE ORAL
Qty: 30 TABLET | Refills: 3 | Status: SHIPPED | OUTPATIENT
Start: 2022-12-01

## 2023-01-30 ENCOUNTER — OFFICE VISIT (OUTPATIENT)
Dept: PODIATRY | Age: 58
End: 2023-01-30
Payer: COMMERCIAL

## 2023-01-30 VITALS — BODY MASS INDEX: 28.98 KG/M2 | HEIGHT: 71 IN | WEIGHT: 207 LBS

## 2023-01-30 DIAGNOSIS — M79.672 PAIN IN LEFT FOOT: ICD-10-CM

## 2023-01-30 DIAGNOSIS — L98.9 BENIGN SKIN LESION: Primary | ICD-10-CM

## 2023-01-30 DIAGNOSIS — L74.513 HYPERHIDROSIS OF SOLES: ICD-10-CM

## 2023-01-30 DIAGNOSIS — M79.671 PAIN IN RIGHT FOOT: ICD-10-CM

## 2023-01-30 PROCEDURE — 3017F COLORECTAL CA SCREEN DOC REV: CPT | Performed by: PODIATRIST

## 2023-01-30 PROCEDURE — 99203 OFFICE O/P NEW LOW 30 MIN: CPT | Performed by: PODIATRIST

## 2023-01-30 PROCEDURE — 17110 DESTRUCTION B9 LES UP TO 14: CPT | Performed by: PODIATRIST

## 2023-01-30 PROCEDURE — G8427 DOCREV CUR MEDS BY ELIG CLIN: HCPCS | Performed by: PODIATRIST

## 2023-01-30 PROCEDURE — G8419 CALC BMI OUT NRM PARAM NOF/U: HCPCS | Performed by: PODIATRIST

## 2023-01-30 PROCEDURE — 4004F PT TOBACCO SCREEN RCVD TLK: CPT | Performed by: PODIATRIST

## 2023-01-30 PROCEDURE — G8482 FLU IMMUNIZE ORDER/ADMIN: HCPCS | Performed by: PODIATRIST

## 2023-01-30 RX ORDER — AMMONIUM LACTATE 12 G/100G
CREAM TOPICAL
Qty: 385 G | Refills: 3 | Status: SHIPPED | OUTPATIENT
Start: 2023-01-30

## 2023-01-30 ASSESSMENT — ENCOUNTER SYMPTOMS
BACK PAIN: 0
COLOR CHANGE: 0
DIARRHEA: 0
NAUSEA: 0
SHORTNESS OF BREATH: 0

## 2023-01-30 NOTE — PROGRESS NOTES
Onur Stern is a 65 y.o. male who presents to the office today with chief complaint of painful callouses to both feet. Chief Complaint   Patient presents with    Callouses     B/l feet    Symptoms began about 5 year(s) ago. Patient denies injury to the feet. Patient states that the lesions are painful with pressure. Pain is rated 8 out of 10 at it's worst and is described as intermittent. Treatments prior to today's visit include: Patient was seen several years ago in my office and was treated with debridement. Allergies   Allergen Reactions    Aspirin Shortness Of Breath    Pcn [Penicillins] Shortness Of Breath     Had Rocephin    Bactrim [Sulfamethoxazole-Trimethoprim] Itching    Doxycycline Hives and Itching     Pt stopped Dozy after 2 days because of reaction. Ibuprofen Rash       Past Medical History:   Diagnosis Date    Alcohol abuse     Allergic rhinitis     Asthma     COPD (chronic obstructive pulmonary disease) (HCC)     DDD (degenerative disc disease), lumbar 7/4/2022    Seen on CT abdomen    GERD (gastroesophageal reflux disease)     Hyperlipidemia with target LDL less than 100 8/29/2019    Hypertension     Pansinusitis     Primary osteoarthritis of both hips 7/4/2022    Seen on CT abdomen and pelvis    Primary osteoarthritis of both knees pe Xrays 8/30/2018       Prior to Admission medications    Medication Sig Start Date End Date Taking? Authorizing Provider   ammonium lactate (LAC-HYDRIN) 12 % cream Apply topically as needed. 1/30/23  Yes Memo Dong DPM   pantoprazole (PROTONIX) 40 MG tablet TAKE ONE TABLET BY MOUTH DAILY 12/1/22  Yes Noe Chu MD   Elastic Bandages & Supports (KNEE BRACE/HINGED BARS LARGE) MISC Needs knee brace bilaterally, hinged.  9/30/22  Yes Noe Chu MD   cyclobenzaprine (FLEXERIL) 10 MG tablet Take 1 tablet by mouth nightly as needed for Muscle spasms 9/30/22  Yes Noe Chu MD   amLODIPine (NORVASC) 10 MG tablet Take 1 tablet by mouth daily 9/30/22  Yes Nannette Durand MD   fluticasone-salmeterol (ADVAIR DISKUS) 500-50 MCG/ACT AEPB diskus inhaler Inhale 1 puff into the lungs in the morning and 1 puff in the evening. 9/30/22  Yes Nannette Durand MD   albuterol sulfate HFA (PROVENTIL;VENTOLIN;PROAIR) 108 (90 Base) MCG/ACT inhaler INHALE TWO PUFFS BY MOUTH EVERY 6 HOURS AS NEEDED FOR WHEEZING OR SHORTNESS OF BREATH (COUGH) 9/30/22  Yes Nannette Durand MD   acetaminophen (TYLENOL) 325 MG tablet Take 2 tablets by mouth every 6 hours as needed for Pain 7/10/22  Yes Lillie Boland, DO   Handicap Placard MISC by Does not apply route Can't walk greater than 200 feet. Expires in 5 years. 7/9/22  Yes Nannette Durand MD   aluminum chloride (DRYSOL) 20 % external solution APPLY TOPICALLY NIGHTLY 3/3/22  Yes Betty Dong DPM   ipratropium-albuterol (DUONEB) 0.5-2.5 (3) MG/3ML SOLN nebulizer solution Take 3 mLs by nebulization every 6 hours as needed for Shortness of Breath or Other (dyspnea, wheezing) 1/7/22  Yes Nannette Durand MD   naproxen (NAPROSYN) 250 MG tablet Take 1 tablet by mouth 2 times daily as needed for Pain ONLY AS NEEDED, CAN CAUSE GI BLEED, ULCERS AND KIDNEY FAILURE IF TAKING IT EVERY DAY 8/18/21  Yes Nannette Durand MD   acetaminophen (TYLENOL) 500 MG tablet Take 1 tablet by mouth every 6 hours as needed for Pain 3/24/21  Yes Nannette Durand MD       Past Surgical History:   Procedure Laterality Date    ANKLE FRACTURE SURGERY Right     [place]    bit by a dog in the right cheek       Family History   Problem Relation Age of Onset    Allergies Daughter     Depression Daughter     Colon Cancer Neg Hx     Prostate Cancer Neg Hx        Social History     Tobacco Use    Smoking status: Every Day     Packs/day: 1.50     Years: 23.00     Pack years: 34.50     Types: Cigarettes    Smokeless tobacco: Never    Tobacco comments:     Cutting down, 0.5 PPD on 9/30/22.  Started smoking at 27 yo   Substance Use Topics    Alcohol use: Yes     Alcohol/week: 12.0 standard drinks     Types: 12 Cans of beer per week     Comment: 6 beers per day       Review of Systems   Constitutional:  Negative for activity change, appetite change, chills, diaphoresis, fatigue and fever. Respiratory:  Negative for shortness of breath. Cardiovascular:  Negative for leg swelling. Gastrointestinal:  Negative for diarrhea and nausea. Endocrine: Negative for cold intolerance, heat intolerance and polyuria. Musculoskeletal:  Positive for arthralgias. Negative for back pain, gait problem, joint swelling and myalgias. Skin:  Negative for color change, pallor, rash and wound. Allergic/Immunologic: Negative for environmental allergies and food allergies. Neurological:  Negative for dizziness, weakness, light-headedness and numbness. Hematological:  Does not bruise/bleed easily. Psychiatric/Behavioral:  Negative for behavioral problems, confusion and self-injury. The patient is not nervous/anxious. Vitals: There were no vitals filed for this visit. General: AAO x 3 in NAD. Integument: There is hyperkeratotic tissue formation noted submetatarsal head one and five of the bilateral feet. There is pain with direct palpation of the lesion(s). Debridement of the lesion(s) with a fifteen blade does reveal a central core. The core of the lesion(s) was debrided with a fifteen blade. No signs of bacterial infection are noted to the lesion(s). There is no induration, subcutaneous nodules, or tightening of the skin noted to the bilateral.     Toenails 1-5 of the right foot do not present with thickness, elongation, discoloration, brittleness, subungual debris. Toenails 1-5 of the left foot do not present with thickness, elongation, discoloration, brittleness, subungual debris.      Interdigital maceration absent to web spaces 1-4, Bilateral.     There are no preulcerative lesions noted to the right foot. There are no preulcerative lesions noted to the left foot. The skin to the bilateral feet is not thin and shiny. The skin to the bilateral feet is  warm, supple, but not dry. There is excessive sweating noted to the bilateral plantar feet. Vascular: DP pulse of the right foot is  palpable. DP pulse of the left foot is  palpable. PT pulse of the right foot is  palpable. PT pulse of the left foot is  palpable. CFT is less than 3 secs to the digits of the right foot. CFT is less than 3 secs to the digits of the left foot. There is no edema noted to the bilateral foot or ankle. There is hair growth noted to the digits of the bilateral feet. There are no varicosities noted to the right foot/ankle. There are no varicosities noted to the left foot/ankle. Erythema is absent to the bilateral feet. Neurological: Reflexes are present to the right plantar foot and to the Achilles tendon. Reflexes are present to the left plantar foot and to the Achilles tendon. Epicritic sensation is  intact to the right foot. Epicritic sensation is  intact to the left foot. Musculoskeletal:  Muscle strength is +5/5 to all four muscle groups of the right lower extremity and +5/5 to all four muscle groups of the left lower extremity. There are no areas of subluxation, dislocation, or laxity noted to either lower extremity. Range of motion to the right ankle is  free of pain or grinding. Range of motion to the left ankle is  free of pain or grinding. Range of motion to the right subtalar joint is  free of pain or grinding. Range of motion to the left subtalar joint is  free of pain or grinding. No abnormalities, asymmetries, or misalignments are seen between the extremities.     Weightbearing evaluation does not reveal rearfoot eversion, medial prominence of the talar head, loss of the medial longitudinal arch height, and too many toes sign bilaterally. The lesser digits of the right foot are not contracted. The lesser digits of the left foot are not contracted. There is no prominence noted to the first metatarsal head without abduction of the hallux of the right foot. There is no prominence noted to the first metatarsal head without abduction of the hallux of the left foot. Shoe examination was performed. Biomechanical Exam: normal bilaterally. Asessment: Patient is a 65 y.o. male with:    Diagnosis Orders   1. Benign skin lesion  TX DESTRUCTION BENIGN LESIONS UP TO 14      2. Hyperhidrosis of soles  ammonium lactate (LAC-HYDRIN) 12 % cream      3. Pain in left foot  TX DESTRUCTION BENIGN LESIONS UP TO 14    ammonium lactate (LAC-HYDRIN) 12 % cream      4. Pain in right foot  TX DESTRUCTION BENIGN LESIONS UP TO 14    ammonium lactate (LAC-HYDRIN) 12 % cream          Plan:  1. Clinical evaluation of the patient. 2. Following debridement of benign skin lesions to the bilateral foot the areas were treated with Phenol and covered with Band-Aids. Patient given a prescription for Lac-Hydrin for treatment of his hyperhidrosis. Patient to return when his lesions become painful again. 3. Contact office with any questions/problems/concerns. Return if symptoms worsen or fail to improve.    1/30/2023      Johnathon Cheadle, DPM

## 2023-01-31 ENCOUNTER — TELEPHONE (OUTPATIENT)
Dept: PODIATRY | Age: 58
End: 2023-01-31

## 2023-01-31 NOTE — TELEPHONE ENCOUNTER
Patient called because he got his prescription for Lac Hydrin cream and read on the tube that it increases skin moisture. He said he is not sure why he got this because his feet sweat a lot and he needs something to keep them dry. He said he asked for Drysol to be sent in because he had it before and it worked well for him but was told his insurance would not cover it. He wants to know it it could be sent to his pharmacy anyways and he can see what the cost of it will be.

## 2023-02-01 DIAGNOSIS — L74.513 HYPERHIDROSIS OF SOLES: Primary | ICD-10-CM

## 2023-02-18 DIAGNOSIS — J44.9 COPD WITH ASTHMA (HCC): ICD-10-CM

## 2023-02-18 RX ORDER — FLUTICASONE PROPIONATE AND SALMETEROL 500; 50 UG/1; UG/1
POWDER RESPIRATORY (INHALATION)
Qty: 60 EACH | Refills: 5 | Status: SHIPPED | OUTPATIENT
Start: 2023-02-18

## 2023-02-18 NOTE — TELEPHONE ENCOUNTER
Patient requesting refill for his Advair medication. Writer informs patient of the no after-hour medication refill policy. Writer informs caller that a note will be sent to the office. Patient verbalizes understanding.

## 2023-02-20 DIAGNOSIS — J44.9 COPD WITH ASTHMA (HCC): ICD-10-CM

## 2023-02-20 RX ORDER — FLUTICASONE PROPIONATE AND SALMETEROL 500; 50 UG/1; UG/1
POWDER RESPIRATORY (INHALATION)
Qty: 60 EACH | Refills: 5 | OUTPATIENT
Start: 2023-02-20

## 2023-02-20 NOTE — TELEPHONE ENCOUNTER
[facility] Clinical Support Pool  Subject: Refill Request     QUESTIONS   Name of Medication? fluticasone-salmeterol (ADVAIR) 500-50 MCG/ACT AEPB   diskus inhaler   Patient-reported dosage and instructions? 2 times per day   How many days do you have left? 0   Preferred Pharmacy? [facility] [id number]   Pharmacy phone number (if available)? [phone]   ---------------------------------------------------------------------------   --------------   CALL BACK INFO   What is the best way for the office to contact you? OK to leave message on   voicemail   Preferred Call Back Phone Number? [phone]   ---------------------------------------------------------------------------   --------------   SCRIPT ANSWERS   Relationship to Patient?  Self

## 2023-02-22 ENCOUNTER — OFFICE VISIT (OUTPATIENT)
Dept: FAMILY MEDICINE CLINIC | Age: 58
End: 2023-02-22
Payer: COMMERCIAL

## 2023-02-22 VITALS
TEMPERATURE: 97.6 F | OXYGEN SATURATION: 98 % | SYSTOLIC BLOOD PRESSURE: 140 MMHG | HEART RATE: 71 BPM | DIASTOLIC BLOOD PRESSURE: 82 MMHG | WEIGHT: 199.2 LBS | HEIGHT: 71 IN | BODY MASS INDEX: 27.89 KG/M2

## 2023-02-22 DIAGNOSIS — Z87.891 PERSONAL HISTORY OF SMOKING: ICD-10-CM

## 2023-02-22 DIAGNOSIS — R26.2 DIFFICULTY WALKING: ICD-10-CM

## 2023-02-22 DIAGNOSIS — M17.0 PRIMARY OSTEOARTHRITIS OF BOTH KNEES: ICD-10-CM

## 2023-02-22 DIAGNOSIS — J20.9 ACUTE BRONCHITIS DUE TO INFECTION: ICD-10-CM

## 2023-02-22 DIAGNOSIS — Z12.11 SCREEN FOR COLON CANCER: ICD-10-CM

## 2023-02-22 DIAGNOSIS — J45.901 ACUTE EXACERBATION OF COPD WITH ASTHMA (HCC): ICD-10-CM

## 2023-02-22 DIAGNOSIS — I10 ESSENTIAL HYPERTENSION: Primary | ICD-10-CM

## 2023-02-22 DIAGNOSIS — J44.1 ACUTE EXACERBATION OF COPD WITH ASTHMA (HCC): ICD-10-CM

## 2023-02-22 PROBLEM — U07.1 COVID-19 VIRUS INFECTION: Status: RESOLVED | Noted: 2022-01-08 | Resolved: 2023-02-22

## 2023-02-22 PROCEDURE — 3017F COLORECTAL CA SCREEN DOC REV: CPT | Performed by: FAMILY MEDICINE

## 2023-02-22 PROCEDURE — 3023F SPIROM DOC REV: CPT | Performed by: FAMILY MEDICINE

## 2023-02-22 PROCEDURE — 3077F SYST BP >= 140 MM HG: CPT | Performed by: FAMILY MEDICINE

## 2023-02-22 PROCEDURE — 99214 OFFICE O/P EST MOD 30 MIN: CPT | Performed by: FAMILY MEDICINE

## 2023-02-22 PROCEDURE — G8427 DOCREV CUR MEDS BY ELIG CLIN: HCPCS | Performed by: FAMILY MEDICINE

## 2023-02-22 PROCEDURE — G8482 FLU IMMUNIZE ORDER/ADMIN: HCPCS | Performed by: FAMILY MEDICINE

## 2023-02-22 PROCEDURE — 4004F PT TOBACCO SCREEN RCVD TLK: CPT | Performed by: FAMILY MEDICINE

## 2023-02-22 PROCEDURE — G8419 CALC BMI OUT NRM PARAM NOF/U: HCPCS | Performed by: FAMILY MEDICINE

## 2023-02-22 PROCEDURE — 3079F DIAST BP 80-89 MM HG: CPT | Performed by: FAMILY MEDICINE

## 2023-02-22 RX ORDER — CYCLOBENZAPRINE HCL 10 MG
10 TABLET ORAL NIGHTLY PRN
Qty: 90 TABLET | Refills: 0 | Status: SHIPPED | OUTPATIENT
Start: 2023-02-22

## 2023-02-22 RX ORDER — CLONIDINE HYDROCHLORIDE 0.1 MG/1
0.1 TABLET ORAL
Qty: 90 TABLET | Refills: 0 | Status: SHIPPED | OUTPATIENT
Start: 2023-02-22

## 2023-02-22 RX ORDER — ACETAMINOPHEN 500 MG
500 TABLET ORAL EVERY 6 HOURS PRN
Qty: 120 TABLET | Refills: 3 | Status: SHIPPED | OUTPATIENT
Start: 2023-02-22

## 2023-02-22 RX ORDER — AZITHROMYCIN 250 MG/1
TABLET, FILM COATED ORAL
Qty: 6 TABLET | Refills: 0 | Status: SHIPPED | OUTPATIENT
Start: 2023-02-22 | End: 2023-02-27

## 2023-02-22 RX ORDER — PREDNISONE 10 MG/1
50 TABLET ORAL DAILY
Qty: 35 TABLET | Refills: 0 | Status: SHIPPED | OUTPATIENT
Start: 2023-02-22 | End: 2023-03-01

## 2023-02-22 SDOH — ECONOMIC STABILITY: INCOME INSECURITY: HOW HARD IS IT FOR YOU TO PAY FOR THE VERY BASICS LIKE FOOD, HOUSING, MEDICAL CARE, AND HEATING?: NOT HARD AT ALL

## 2023-02-22 SDOH — ECONOMIC STABILITY: HOUSING INSECURITY
IN THE LAST 12 MONTHS, WAS THERE A TIME WHEN YOU DID NOT HAVE A STEADY PLACE TO SLEEP OR SLEPT IN A SHELTER (INCLUDING NOW)?: NO

## 2023-02-22 SDOH — ECONOMIC STABILITY: FOOD INSECURITY: WITHIN THE PAST 12 MONTHS, YOU WORRIED THAT YOUR FOOD WOULD RUN OUT BEFORE YOU GOT MONEY TO BUY MORE.: NEVER TRUE

## 2023-02-22 SDOH — ECONOMIC STABILITY: FOOD INSECURITY: WITHIN THE PAST 12 MONTHS, THE FOOD YOU BOUGHT JUST DIDN'T LAST AND YOU DIDN'T HAVE MONEY TO GET MORE.: NEVER TRUE

## 2023-02-22 ASSESSMENT — PATIENT HEALTH QUESTIONNAIRE - PHQ9
SUM OF ALL RESPONSES TO PHQ QUESTIONS 1-9: 0
SUM OF ALL RESPONSES TO PHQ QUESTIONS 1-9: 0
2. FEELING DOWN, DEPRESSED OR HOPELESS: 0
SUM OF ALL RESPONSES TO PHQ9 QUESTIONS 1 & 2: 0
SUM OF ALL RESPONSES TO PHQ QUESTIONS 1-9: 0
SUM OF ALL RESPONSES TO PHQ QUESTIONS 1-9: 0
1. LITTLE INTEREST OR PLEASURE IN DOING THINGS: 0

## 2023-02-22 ASSESSMENT — ENCOUNTER SYMPTOMS
DIARRHEA: 0
CHEST TIGHTNESS: 0
ABDOMINAL PAIN: 0
SHORTNESS OF BREATH: 1
COUGH: 1
BACK PAIN: 1
WHEEZING: 1
CONSTIPATION: 0
VOMITING: 0
NAUSEA: 0
ABDOMINAL DISTENTION: 0

## 2023-02-22 NOTE — PROGRESS NOTES
Navdeep Dominguez (:  1965) is a 65 y.o. male,Established patient, here for evaluation of the following chief complaint(s): Hypertension, Hyperlipidemia, COPD, and Knee Pain      ASSESSMENT/PLAN:    1. Essential hypertension  Inadequately controlled   -start     cloNIDine (CATAPRES) 0.1 MG tablet; Take 1 tablet by mouth daily (before dinner) For blood pressure and cravings, Disp-90 tablet, R-0Normal  Continue amlodipine 10 Mg daily  Discussed low salt diet and BP and pulse monitoring. Recent labs reviewed with patient, CBC within normal limits, CMP within normal limits, TSH within normal limits, UA within normal limits on 10/13/2022    2. Primary osteoarthritis of both knees pe Xrays  Worsening  Reluctantly he is agreeable for referral back to orthopedics, he is interested to get\" gel injections\"  He has knee braces but they are worn down, he has never received the knee braces I ordered for him in September, because insurance would not cover now unless 2 years from prior, he is due again for knee braces he May  Patient would benefit from knee braces hinged, to help with pain and mobility  Ambulates with small quad cane, patient received large quad from insurance, which he could not use  Patient wants something for inflammation, cannot take NSAIDs due to prior allergic reaction to ibuprofen, rash  -     Radha - Gomez Biggs DO, Orthopedic Surgery, [facility]  -     predniSONE (DELTASONE) 10 MG tablet; Take 5 tablets by mouth daily for 7 days, Disp-35 tablet, R-0Normal  -     cyclobenzaprine (FLEXERIL) 10 MG tablet; Take 1 tablet by mouth nightly as needed for Muscle spasms, Disp-90 tablet, R-0Normal  -     acetaminophen (TYLENOL) 500 MG tablet; Take 1 tablet by mouth every 6 hours as needed for Pain, Disp-120 tablet, R-3Normal  No gout  Lab Results   Component Value Date    URICACID 4.7 10/13/2022   Patient says he cannot afford not to work because of bills and family depending on him    3.  Acute exacerbation of COPD with asthma ([facility] [address]  Worsening  Strongly counseled to quit smoking  We will treat acute bronchitis  I admonished him to quit smoking, advised him he needs to cut down, he will need to quit before having any knee surgery anyway, he is reluctantly agreeable for clonidine for smoking cessation and alcohol cessation  -     predniSONE (DELTASONE) 10 MG tablet; Take 5 tablets by mouth daily for 7 days, Disp-35 tablet, R-0Normal  -     azithromycin (ZITHROMAX) 250 MG tablet; 500 mg orally on day one followed by 250 mg daily on days two through five, Disp-6 tablet, R-0Normal  Continue Advair 1 puff twice a day rinse mouth after use and albuterol as needed. He also has DuoNebs at home which has been using as needed, advised to start using it every 6 hours    4. Acute bronchitis due to infection  Worsening  -     azithromycin (ZITHROMAX) 250 MG tablet; 500 mg orally on day one followed by 250 mg daily on days two through five, Disp-6 tablet, R-0Normal  5. Difficulty walking  Worsening  He would benefit from evaluation by orthopedics and possibly unilateral knee replacement   He would benefit from renewal of his knee braces, needs hinged knee braces, to improve his stability and pain    6. Screen for colon cancer  -     POCT Fecal Immunochemical Test (FIT); Future      Has cologuard, but he does not want it. He also declines colonoscopy. Risk of colon cancer discussed    Anastacio received counseling on the following healthy behaviors: nutrition, exercise, medication adherence, tobacco cessation, and decrease in alcohol consumption  Reviewed prior labs and health maintenance  Discussed use, benefit, and side effects of prescribed medications. Barriers to medication compliance addressed. Patient given educational materials - see patient instructions  Was a self-tracking handout given in paper form or via [employer]? Yes  All patient questions answered. Patient voiced understanding.   The patient's past medical,surgical, social, and family history as well as his current medications and allergies were reviewed as documented in today's encounter. Medications, labs, diagnostic studies, consultations and follow-up as documented in this encounter. Return in about 7 months (around 10/3/2023) for Visit type PHYSICAL, VISION screen, PHQ9. .    Data Unavailable    Future Appointments   Date Time Provider Sha Porter   10/5/2023  4:00 PM Giselle Brothers MD [id number]         SUBJECTIVE/OBJECTIVE:    Hypertension: Patient here for follow-up. He is not exercising and is adherent to low salt diet. Does not check the blood pressure at home  Cardiac symptoms dyspnea and fatigue. Patient denies chest pain, chest pressure/discomfort, claudication, exertional chest pressure/discomfort, irregular heart beat, lower extremity edema, near-syncope, orthopnea, palpitations, paroxysmal nocturnal dyspnea, syncope, and tachypnea. Cardiovascular risk factors: dyslipidemia, hypertension, male gender, obesity (BMI >= 30 kg/m2), and smoking/ tobacco exposure. Use of agents associated with hypertension: none. History of target organ damage: none. Blood pressure high twice in the past  BP Readings from Last 3 Encounters:   02/22/23 (!) 140/82   09/30/22 122/76   07/10/22 (!) 134/97          Pulse is Normal.    Pulse Readings from Last 3 Encounters:   02/22/23 71   09/30/22 96   07/10/22 90       Weight has been stable   Wt Readings from Last 3 Encounters:   02/22/23 199 lb 3.2 oz (90.4 kg)   01/30/23 207 lb (93.9 kg)   10/17/22 207 lb (93.9 kg)     09/30/22 200 lb 12.8 oz (91.1 kg)       Patient reports worsening knee pain bilateral.  He reports the pain level up to 8 out of 10. Has been using knee braces but they are old. He did not qualify for another pair of knee braces yet, but will get them in May. He received a quad cane but it is a large quad cane which is very difficult for him to use.   He brought himself a small quad cane instead which has better   Patient reports right knees always swollen and tight. He wants something for inflammation, he cannot take NSAIDs due to prior rash to ibuprofen. Left knee worse, he walks with the knee rotated. He cannot climb up the stairs. 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 Taking Tylenol, sometimes helps, sometimes does not help. He feels the right knee is getting inflamed. Prior uric acid was normal  He wants to get the gel injections which will help. Lab Results   Component Value Date    URICACID 4.7 10/13/2022       COPD-Asthma:  Current treatment includes short-acting beta agonist inhaler, combined beta agonist/steroid inhaler, which has been not very effective. Residual symptoms: acute dyspnea, cough productive of yellow and green sputum in moderate amounts, and wheezing. He denies chest pain. He requires his rescue inhaler 2 time(s) per day. Patient says he has another flareup of COPD asthma for the past week, not getting better and the mucus is getting more green    Nicotine dependence. Smoker, counseling given to quit smoking. Ready to quit: No  Counseling given: Yes  Tobacco comments: Cutting down, 0.5 PPD on 9/30/22. Started smoking at 25 yo               [x]Negative depression screening. PHQ Scores 2/22/2023 9/30/2022 1/7/2022 3/24/2021 11/13/2020 1/15/2020 8/29/2019   PHQ2 Score 0 0 0 0 0 0 0   PHQ9 Score 0 0 0 0 0 0 0       Prior to Visit Medications    Medication Sig Taking? Authorizing Provider   fluticasone-salmeterol (ADVAIR) 500-50 MCG/ACT AEPB diskus inhaler INHALE ONE PUFF BY MOUTH TWICE A DAY IN THE MORNING AND IN THE EVENING Yes Mary Mcgill MD   aluminum chloride (DRYSOL) 20 % external solution Apply topically nightly. Yes Moustapha Dong DPM   ammonium lactate (LAC-HYDRIN) 12 % cream Apply topically as needed.  Yes Blanca Slater DPM   pantoprazole (PROTONIX) 40 MG tablet TAKE ONE TABLET BY MOUTH DAILY Yes Audie Marquez MD   Elastic Bandages & Supports (KNEE BRACE/HINGED BARS LARGE) MISC Needs knee brace bilaterally, hinged. Yes Audie Marquez MD   cyclobenzaprine (FLEXERIL) 10 MG tablet Take 1 tablet by mouth nightly as needed for Muscle spasms Yes Audie Marquez MD   amLODIPine (NORVASC) 10 MG tablet Take 1 tablet by mouth daily Yes Audie Marquez MD   albuterol sulfate HFA (PROVENTIL;VENTOLIN;PROAIR) 108 (90 Base) MCG/ACT inhaler INHALE TWO PUFFS BY MOUTH EVERY 6 HOURS AS NEEDED FOR WHEEZING OR SHORTNESS OF BREATH (COUGH) Yes Audie Marquez MD   acetaminophen (TYLENOL) 325 MG tablet Take 2 tablets by mouth every 6 hours as needed for Pain Yes Albin Ceron,    Handicap Placard MISC by Does not apply route Can't walk greater than 200 feet. Expires in 5 years. Yes Audie Marquez MD   aluminum chloride (DRYSOL) 20 % external solution APPLY TOPICALLY NIGHTLY Yes Emily Dong DPM   ipratropium-albuterol (DUONEB) 0.5-2.5 (3) MG/3ML SOLN nebulizer solution Take 3 mLs by nebulization every 6 hours as needed for Shortness of Breath or Other (dyspnea, wheezing) Yes Vi Mccarty MD   naproxen (NAPROSYN) 250 MG tablet Take 1 tablet by mouth 2 times daily as needed for Pain ONLY AS NEEDED, CAN CAUSE GI BLEED, ULCERS AND KIDNEY FAILURE IF TAKING IT EVERY DAY Yes Audie Marquez MD   acetaminophen (TYLENOL) 500 MG tablet Take 1 tablet by mouth every 6 hours as needed for Pain Yes Audie Marquez MD       Social History     Tobacco Use    Smoking status: Every Day     Packs/day: 1.50     Years: 23.00     Pack years: 34.50     Types: Cigarettes    Smokeless tobacco: Never    Tobacco comments:     Cutting down, 0.5 PPD on 9/30/22. Started smoking at 27 yo   Substance Use Topics    Alcohol use:  Yes     Alcohol/week: 12.0 standard drinks     Types: 12 Cans of beer per week     Comment: 6 beers per day in September 2022    Drug use: No         Review of Systems   Constitutional:  Positive for fatigue. Negative for activity change, appetite change, chills, diaphoresis, fever and unexpected weight change. HENT:  Negative for congestion. Eyes:  Positive for visual disturbance. Respiratory:  Positive for cough, shortness of breath and wheezing. Negative for chest tightness. Cardiovascular:  Negative for chest pain, palpitations and leg swelling. Gastrointestinal:  Negative for abdominal distention, abdominal pain, constipation, diarrhea, nausea and vomiting. Endocrine: Negative for cold intolerance, heat intolerance, polydipsia, polyphagia and polyuria. Genitourinary:  Negative for frequency. Musculoskeletal:  Positive for arthralgias, back pain and gait problem. Ambulates with quad cane. Knee braces worned down, but wearing them   Skin:  Negative for rash. Allergic/Immunologic: Positive for environmental allergies. Neurological:  Negative for dizziness, weakness and headaches. Hematological:  Bruises/bleeds easily. Psychiatric/Behavioral:  Negative for decreased concentration, dysphoric mood and sleep disturbance. The patient is not nervous/anxious.        -vital signs stable and within normal limits except overweight per BMI and high blood pressure    BP (!) 140/82   Pulse 71   Temp 97.6 °F (36.4 °C)   Ht 5' 11\" (1.803 m)   Wt 199 lb 3.2 oz (90.4 kg)   SpO2 98%   BMI 27.78 kg/m²        Physical Exam  Vitals and nursing note reviewed. Constitutional:       General: He is not in acute distress. Appearance: Normal appearance. He is well-developed. He is not diaphoretic. HENT:      Head: Normocephalic and atraumatic. Right Ear: Hearing and external ear normal.      Left Ear: Hearing and external ear normal.      Mouth/Throat:      Comments: I did not examine the mouth due to coronavirus pandemic and wearing masks. Eyes:      General: Lids are normal. No scleral icterus.         Right eye: No discharge. Left eye: No discharge. Extraocular Movements: Extraocular movements intact. Conjunctiva/sclera: Conjunctivae normal.   Neck:      Thyroid: No thyromegaly. Cardiovascular:      Rate and Rhythm: Normal rate and regular rhythm. Heart sounds: Normal heart sounds. No murmur heard. Pulmonary:      Effort: Pulmonary effort is normal. No respiratory distress. Breath sounds: Examination of the right-middle field reveals rhonchi. Examination of the left-middle field reveals rhonchi. Examination of the right-lower field reveals rhonchi. Examination of the left-lower field reveals rhonchi. Wheezing and rhonchi present. No rales. Comments: Smoking cessation counseling given  Chest:      Chest wall: No tenderness. Abdominal:      General: Bowel sounds are normal. There is no distension. Palpations: Abdomen is soft. There is no hepatomegaly or splenomegaly. Tenderness: There is no abdominal tenderness. Musculoskeletal:         General: Normal range of motion. Cervical back: Normal range of motion and neck supple. Right knee: Deformity, bony tenderness and crepitus present. Tenderness present. Left knee: Deformity, bony tenderness and crepitus present. Tenderness present. Right lower leg: No edema. Left lower leg: No edema. Comments: Difficulty getting up from sitting position, unable to bend right knee, very limited flexion, wearing knee braces, walking with severe valgus on the left side, limping, ambulates with quad cane   Skin:     General: Skin is warm and dry. Capillary Refill: Capillary refill takes less than 2 seconds. Findings: No rash. Neurological:      Mental Status: He is alert and oriented to person, place, and time. Cranial Nerves: No cranial nerve deficit. Motor: No abnormal muscle tone.       Coordination: Coordination normal.      Deep Tendon Reflexes: Reflexes normal.   Psychiatric:         Attention and Perception: Attention normal.         Mood and Affect: Mood is anxious. Speech: Speech normal.         Behavior: Behavior normal.         Thought Content: Thought content normal.         Cognition and Memory: Cognition normal.         Judgment: Judgment normal.         I personally reviewed testing with patient and all questions fully answered. labs within normal limits    Hospital Outpatient Visit on 10/13/2022   Component Date Value Ref Range Status    PSA 10/13/2022 0.39  <4.1 ng/mL Final    Comment: The Roche \"ECLIA\" assay is used. Results obtained with different assay methods cannot be   used interchangeably. WBC 10/13/2022 5.5  3.5 - 11.0 k/uL Final    RBC 10/13/2022 4.67  4.5 - 5.9 m/uL Final    Hemoglobin 10/13/2022 14.7  13.5 - 17.5 g/dL Final    Hematocrit 10/13/2022 42.8  41 - 53 % Final    MCV 10/13/2022 91.6  80 - 100 fL Final    MCH 10/13/2022 31.5  26 - 34 pg Final    MCHC 10/13/2022 34.4  31 - 37 g/dL Final    RDW 10/13/2022 13.5  11.5 - 14.9 % Final    Platelets 45/01/5569 280  150 - 450 k/uL Final    MPV 10/13/2022 7.0  6.0 - 12.0 fL Final    Glucose 10/13/2022 93  70 - 99 mg/dL Final    BUN 10/13/2022 12  6 - 20 mg/dL Final    Creatinine 10/13/2022 0.84  0.70 - 1.20 mg/dL Final    Est, Glom Filt Rate 10/13/2022 >60  >60 mL/min/1.73m2 Final    Comment:       Effective Oct 3, 2022        These results are not intended for use in patients <25years of age. eGFR results are calculated without a race factor using the 2021 CKD-EPI equation. Careful clinical correlation is recommended, particularly when comparing to results   calculated using previous equations. The CKD-EPI equation is less accurate in patients with extremes of muscle mass, extra-renal   metabolism of creatine, excessive creatine ingestion, or following therapy that affects   renal tubular secretion.       Calcium 10/13/2022 9.8  8.6 - 10.4 mg/dL Final    Sodium 10/13/2022 138  135 - 144 mmol/L Final Potassium 10/13/2022 4.3  3.7 - 5.3 mmol/L Final    Chloride 10/13/2022 99  98 - 107 mmol/L Final    CO2 10/13/2022 27  20 - 31 mmol/L Final    Anion Gap 10/13/2022 12  9 - 17 mmol/L Final    Alkaline Phosphatase 10/13/2022 104  40 - 129 U/L Final    ALT 10/13/2022 25  5 - 41 U/L Final    AST 10/13/2022 26  <40 U/L Final    Total Bilirubin 10/13/2022 0.4  0.3 - 1.2 mg/dL Final    Total Protein 10/13/2022 7.5  6.4 - 8.3 g/dL Final    Albumin 10/13/2022 4.6  3.5 - 5.2 g/dL Final    Uric Acid 10/13/2022 4.7  3.4 - 7.0 mg/dL Final    Magnesium 10/13/2022 2.1  1.6 - 2.6 mg/dL Final    Cholesterol 10/13/2022 183  <200 mg/dL Final    Comment:    Cholesterol Guidelines:      <200  Desirable   200-240  Borderline      >240  Undesirable         HDL 10/13/2022 115  >40 mg/dL Final    Comment:    HDL Guidelines:    <40     Undesirable   40-59    Borderline    >59     Desirable         LDL Cholesterol 10/13/2022 63  0 - 130 mg/dL Final    Comment:    LDL Guidelines:     <100    Desirable   100-129   Near to/above Desirable   130-159   Borderline      >159   Undesirable     Direct (measured) LDL and calculated LDL are not interchangeable tests. Chol/HDL Ratio 10/13/2022 1.6  <5 Final            Triglycerides 10/13/2022 27  <150 mg/dL Final    Comment:    Triglyceride Guidelines:     <150   Desirable   150-199  Borderline   200-499  High     >499   Very high   Based on AHA Guidelines for fasting triglyceride, October 2012.          TSH 10/13/2022 1.68  0.30 - 5.00 uIU/mL Final    Color, UA 10/13/2022 Yellow  Yellow Final    Turbidity UA 10/13/2022 Clear  Clear Final    Glucose, Ur 10/13/2022 NEGATIVE  NEGATIVE Final    Bilirubin Urine 10/13/2022 NEGATIVE  NEGATIVE Final    Ketones, Urine 10/13/2022 NEGATIVE  NEGATIVE Final    Specific Gravity, UA 10/13/2022 1.010  1.000 - 1.030 Final    Urine Hgb 10/13/2022 NEGATIVE  NEGATIVE Final    pH, UA 10/13/2022 6.5  5.0 - 8.0 Final    Protein, UA 10/13/2022 NEGATIVE  NEGATIVE Final Urobilinogen, Urine 10/13/2022 Normal  Normal Final    Nitrite, Urine 10/13/2022 NEGATIVE  NEGATIVE Final    Leukocyte Esterase, Urine 10/13/2022 NEGATIVE  NEGATIVE Final    Urinalysis Comments 10/13/2022 Microscopic exam not performed based on chemical results unless requested in original order.    Final         Lab Results   Component Value Date    CHOL 183 10/13/2022    CHOL 174 09/15/2021    CHOL 194 2020     Lab Results   Component Value Date    TRIG 27 10/13/2022    TRIG 35 09/15/2021    TRIG 43 2020     Lab Results   Component Value Date     10/13/2022     09/15/2021     2020     Lab Results   Component Value Date    LDLCHOLESTEROL 63 10/13/2022    LDLCHOLESTEROL 62 09/15/2021    LDLCHOLESTEROL 73 2020     Lab Results   Component Value Date    CHOLHDLRATIO 1.6 10/13/2022    CHOLHDLRATIO 1.7 09/15/2021    CHOLHDLRATIO 1.7 2020     Orders Placed This Encounter   Medications    predniSONE (DELTASONE) 10 MG tablet     Sig: Take 5 tablets by mouth daily for 7 days     Dispense:  35 tablet     Refill:  0    cyclobenzaprine (FLEXERIL) 10 MG tablet     Sig: Take 1 tablet by mouth nightly as needed for Muscle spasms     Dispense:  90 tablet     Refill:  0    acetaminophen (TYLENOL) 500 MG tablet     Sig: Take 1 tablet by mouth every 6 hours as needed for Pain     Dispense:  120 tablet     Refill:  3    cloNIDine (CATAPRES) 0.1 MG tablet     Sig: Take 1 tablet by mouth daily (before dinner) For blood pressure and cravings     Dispense:  90 tablet     Refill:  0    azithromycin (ZITHROMAX) 250 MG tablet     Si mg orally on day one followed by 250 mg daily on days two through five     Dispense:  6 tablet     Refill:  0         Orders Placed This Encounter   Procedures    Radha Dominguez DO, Orthopedic Surgery, [facility]     Referral Priority:   Routine     Referral Type:   Eval and Treat     Referral Reason:   Specialty Services Required     Referred to Provider:   Candie Durand DO     Requested Specialty:   Orthopedic Surgery     Number of Visits Requested:   1    POCT Fecal Immunochemical Test (FIT)     Standing Status:   Future     Standing Expiration Date:   2/22/2024         Medications Discontinued During This Encounter   Medication Reason    acetaminophen (TYLENOL) 325 MG tablet REORDER    acetaminophen (TYLENOL) 500 MG tablet REORDER    cyclobenzaprine (FLEXERIL) 10 MG tablet REORDER    naproxen (NAPROSYN) 250 MG tablet Therapy completed         On this date 2/22/2023 I have spent 35 minutes reviewing previous notes, test results and face to face with the patient discussing the diagnosis and importance of compliance with the treatment plan as well as documenting on the day of the visit. This note was completed by using the assistance of a speech-recognition program. However, inadvertent computerized transcription errors may be present. Although every effort was made to ensure accuracy, no guarantees can be provided that every mistake has been identified and corrected by editing. An electronic signature was used to authenticate this note.   Electronically signed by Etta Wilkes MD on 2/22/2023 at 7:32 PM

## 2023-02-22 NOTE — PROGRESS NOTES
Visit Information    Have you changed or started any medications since your last visit including any over-the-counter medicines, vitamins, or herbal medicines? no   Have you stopped taking any of your medications? Is so, why? -  no  Are you having any side effects from any of your medications? - no    Have you seen any other physician or provider since your last visit? yes -    Have you had any other diagnostic tests since your last visit?  no   Have you been seen in the emergency room and/or had an admission in a hospital since we last saw you?  no   Have you had your routine dental cleaning in the past 6 months?  no     Do you have an active MyChart account? If no, what is the barrier?   Yes    Patient Care Team:  Lainey Mosher MD as PCP - General (Family Medicine)  Lainey Mosher MD as PCP - Empaneled Provider  Lynn Zavala DPM as Consulting Physician (Podiatry)  Surjit Chisholm MD as Consulting Physician (Orthopedic Surgery)  Satish Sylvester DO as Consulting Physician (Orthopedic Surgery)    Medical History Review  Past Medical, Family, and Social History reviewed and does contribute to the patient presenting condition    Health Maintenance   Topic Date Due    Colorectal Cancer Screen  05/12/2018    Depression Screen  09/30/2023    Low dose CT lung screening  10/17/2023    Lipids  10/13/2027    DTaP/Tdap/Td vaccine (2 - Td or Tdap) 08/18/2031    Flu vaccine  Completed    Shingles vaccine  Completed    Pneumococcal 0-64 years Vaccine  Completed    COVID-19 Vaccine  Completed    Hepatitis C screen  Completed    HIV screen  Completed    Hepatitis A vaccine  Aged Out    Hib vaccine  Aged Out    Meningococcal (ACWY) vaccine  Aged Out

## 2023-02-28 DIAGNOSIS — R19.5 POSITIVE FIT (FECAL IMMUNOCHEMICAL TEST): Primary | ICD-10-CM

## 2023-03-08 ENCOUNTER — NURSE ONLY (OUTPATIENT)
Dept: FAMILY MEDICINE CLINIC | Age: 58
End: 2023-03-08
Payer: COMMERCIAL

## 2023-03-08 VITALS — SYSTOLIC BLOOD PRESSURE: 130 MMHG | HEART RATE: 97 BPM | OXYGEN SATURATION: 98 % | DIASTOLIC BLOOD PRESSURE: 90 MMHG

## 2023-03-08 DIAGNOSIS — I10 ESSENTIAL HYPERTENSION: Primary | ICD-10-CM

## 2023-03-08 PROCEDURE — 3075F SYST BP GE 130 - 139MM HG: CPT | Performed by: FAMILY MEDICINE

## 2023-03-08 PROCEDURE — 99211 OFF/OP EST MAY X REQ PHY/QHP: CPT | Performed by: FAMILY MEDICINE

## 2023-03-08 PROCEDURE — 3080F DIAST BP >= 90 MM HG: CPT | Performed by: FAMILY MEDICINE

## 2023-03-08 NOTE — PROGRESS NOTES
Chief Complaint   Patient presents with    Hypertension    Blood Pressure Check        Patient is here for blood pressure recheck.     1. Essential hypertension      NOT controlled BP   Needs to take clonidine 0.1 mg , very small dose  With amlodipine 10 mg      BP Readings from Last 3 Encounters:   03/08/23 (!) 130/90   02/22/23 (!) 140/82   09/30/22 122/76       Pulse Readings from Last 3 Encounters:   03/08/23 97   02/22/23 71   09/30/22 96       Future Appointments   Date Time Provider Sha Porter   3/8/2023  3:30 PM MD yaima Murphy [facility]   10/5/2023  4:00 PM [facility]

## 2023-03-08 NOTE — PROGRESS NOTES
Noted    Future Appointments   Date Time Provider Sha Porter   10/5/2023  4:00 PM Del Batista MD [facility]

## 2023-03-08 NOTE — PROGRESS NOTES
Chief Complaint   Patient presents with    Hypertension    Blood Pressure Check      Gilmar Melendrez is here for BP check    BP Readings from Last 3 Encounters:   03/08/23 (!) 130/90   02/22/23 (!) 140/82   09/30/22 122/76       BP is 130/90  PT HERE TODAY FOR BLOOD PRESSURE CHECK IT IS STILL BORDERLINE HIGH HE DID STATE THAT HE DID NOT START THE NEW MEDICATION DUE TO READING THE SIDE EFFECTS AND HE STATE HE DID SMOKE A FEW CIGARETTES BEFORE THIS APPOINTMENT.       Future Appointments   Date Time Provider Sha Porter   3/8/2023  3:30 PM [facility]EDU[facility]   10/5/2023  4:00 PM Dino Rod MD [facility]

## 2023-03-21 ENCOUNTER — TELEPHONE (OUTPATIENT)
Dept: SURGERY | Age: 58
End: 2023-03-21

## 2023-03-21 NOTE — TELEPHONE ENCOUNTER
LVM for pt to return office call to be scheduled for a diagnostic colonoscopy.    Electronically signed by Snehal Zarate MA on 3/21/2023 at 4:04 PM

## 2023-04-01 DIAGNOSIS — J44.9 COPD WITH ASTHMA (HCC): ICD-10-CM

## 2023-04-01 DIAGNOSIS — K21.9 GASTROESOPHAGEAL REFLUX DISEASE WITHOUT ESOPHAGITIS: ICD-10-CM

## 2023-04-03 RX ORDER — ALBUTEROL SULFATE 90 UG/1
AEROSOL, METERED RESPIRATORY (INHALATION)
Qty: 18 G | Refills: 2 | Status: SHIPPED | OUTPATIENT
Start: 2023-04-03

## 2023-04-03 RX ORDER — PANTOPRAZOLE SODIUM 40 MG/1
TABLET, DELAYED RELEASE ORAL
Qty: 30 TABLET | Refills: 3 | Status: SHIPPED | OUTPATIENT
Start: 2023-04-03

## 2023-04-03 NOTE — TELEPHONE ENCOUNTER
Please Approve or Refuse.   Send to Pharmacy per Pt's Request:      Next Visit Date:  Visit date not found   Last Visit Date: 2/22/2023    Hemoglobin A1C (%)   Date Value   03/24/2021 4.8             ( goal A1C is < 7)   BP Readings from Last 3 Encounters:   03/08/23 (!) 130/90   02/22/23 (!) 140/82   09/30/22 122/76          (goal 120/80)  BUN   Date Value Ref Range Status   10/13/2022 12 6 - 20 mg/dL Final     Creatinine   Date Value Ref Range Status   10/13/2022 0.84 0.70 - 1.20 mg/dL Final     Potassium   Date Value Ref Range Status   10/13/2022 4.3 3.7 - 5.3 mmol/L Final

## 2023-04-04 ENCOUNTER — TELEPHONE (OUTPATIENT)
Dept: SURGERY | Age: 58
End: 2023-04-04

## 2023-04-10 ENCOUNTER — TELEPHONE (OUTPATIENT)
Dept: SURGERY | Age: 58
End: 2023-04-10

## 2023-05-05 ENCOUNTER — TELEPHONE (OUTPATIENT)
Dept: FAMILY MEDICINE CLINIC | Age: 58
End: 2023-05-05

## 2023-05-08 NOTE — TELEPHONE ENCOUNTER
5/8/2023  Patient is MY-CHART ACTIVE. A My-Chart message has been sent out to patient. Per Provider request to deliver message to patient in regard to come back in for a blood pressure check.   Future Appointments   Date Time Provider Sha Porter   10/5/2023  4:00 PM Allan Castelan MD [facility]

## 2023-06-18 DIAGNOSIS — K21.9 GASTROESOPHAGEAL REFLUX DISEASE WITHOUT ESOPHAGITIS: ICD-10-CM

## 2023-06-19 RX ORDER — PANTOPRAZOLE SODIUM 40 MG/1
TABLET, DELAYED RELEASE ORAL
Qty: 30 TABLET | Refills: 3 | Status: SHIPPED | OUTPATIENT
Start: 2023-06-19

## 2023-06-19 NOTE — TELEPHONE ENCOUNTER
Please Approve or Refuse.   Send to Pharmacy per Pt's Request: [facility]     Next Visit Date:  10/5/2023   Last Visit Date: 2/22/2023    Hemoglobin A1C (%)   Date Value   03/24/2021 4.8             ( goal A1C is < 7)   BP Readings from Last 3 Encounters:   03/08/23 (!) 130/90   02/22/23 (!) 140/82   09/30/22 122/76          (goal 120/80)  BUN   Date Value Ref Range Status   10/13/2022 12 6 - 20 mg/dL Final     Creatinine   Date Value Ref Range Status   10/13/2022 0.84 0.70 - 1.20 mg/dL Final     Potassium   Date Value Ref Range Status   10/13/2022 4.3 3.7 - 5.3 mmol/L Final

## 2023-07-06 DIAGNOSIS — J44.9 COPD WITH ASTHMA (HCC): ICD-10-CM

## 2023-07-06 RX ORDER — ALBUTEROL SULFATE 90 UG/1
AEROSOL, METERED RESPIRATORY (INHALATION)
Qty: 18 G | Refills: 2 | Status: SHIPPED | OUTPATIENT
Start: 2023-07-06

## 2023-07-15 DIAGNOSIS — J44.9 COPD WITH ASTHMA (HCC): ICD-10-CM

## 2023-07-17 RX ORDER — ALBUTEROL SULFATE 90 UG/1
AEROSOL, METERED RESPIRATORY (INHALATION)
Qty: 18 G | Refills: 2 | Status: SHIPPED | OUTPATIENT
Start: 2023-07-17

## 2023-07-31 DIAGNOSIS — J44.9 COPD WITH ASTHMA (HCC): ICD-10-CM

## 2023-08-01 RX ORDER — FLUTICASONE PROPIONATE AND SALMETEROL 50; 500 UG/1; UG/1
POWDER RESPIRATORY (INHALATION)
Qty: 60 EACH | Refills: 3 | Status: SHIPPED | OUTPATIENT
Start: 2023-08-01

## 2023-08-01 NOTE — TELEPHONE ENCOUNTER
8/1/2023  Patient is MY-CHART STATUS ACTIVE. A My-Chart message has been sent out to patient. Per Provider request to deliver message to patient in regard to scheduling bp check.   Future Appointments   Date Time Provider [facility]  [facility]   10/5/2023  4:00 PM Dhiraj Mast MD fp [facility]

## 2023-08-01 NOTE — TELEPHONE ENCOUNTER
Please Approve or Refuse.   Send to Pharmacy per Pt's Request:      Next Visit Date:  10/5/2023   Last Visit Date: 2/22/2023    Hemoglobin A1C (%)   Date Value   03/24/2021 4.8             ( goal A1C is < 7)   BP Readings from Last 3 Encounters:   03/08/23 (!) 130/90   02/22/23 (!) 140/82   09/30/22 122/76          (goal 120/80)  BUN   Date Value Ref Range Status   10/13/2022 12 6 - 20 mg/dL Final     Creatinine   Date Value Ref Range Status   10/13/2022 0.84 0.70 - 1.20 mg/dL Final     Potassium   Date Value Ref Range Status   10/13/2022 4.3 3.7 - 5.3 mmol/L Final

## 2023-08-01 NOTE — TELEPHONE ENCOUNTER
Patient needs appointment in 1 week for blood pressure check    BP Readings from Last 3 Encounters:   03/08/23 (!) 130/90   02/22/23 (!) 140/82   09/30/22 122/76

## 2023-09-20 ENCOUNTER — TELEPHONE (OUTPATIENT)
Dept: ONCOLOGY | Age: 58
End: 2023-09-20

## 2023-09-20 DIAGNOSIS — Z87.891 PERSONAL HISTORY OF NICOTINE DEPENDENCE: Primary | ICD-10-CM

## 2023-09-20 NOTE — TELEPHONE ENCOUNTER
Our records indicate that your patient is coming due for their annual lung cancer screening follow up testing. For your convenience, we have pended the order for the scan for you. If you do not agree with the need for the test, please cancel the order and let us know. Sincerely,    [address] Screening Program    Auto printed reminder letter sent to patient.

## 2023-10-05 ENCOUNTER — OFFICE VISIT (OUTPATIENT)
Dept: FAMILY MEDICINE CLINIC | Age: 58
End: 2023-10-05

## 2023-10-05 VITALS
BODY MASS INDEX: 28.84 KG/M2 | SYSTOLIC BLOOD PRESSURE: 138 MMHG | OXYGEN SATURATION: 98 % | DIASTOLIC BLOOD PRESSURE: 80 MMHG | HEART RATE: 88 BPM | HEIGHT: 71 IN | WEIGHT: 206 LBS

## 2023-10-05 DIAGNOSIS — Z11.59 ENCOUNTER FOR SCREENING FOR OTHER VIRAL DISEASES: ICD-10-CM

## 2023-10-05 DIAGNOSIS — Z87.891 PERSONAL HISTORY OF TOBACCO USE: ICD-10-CM

## 2023-10-05 DIAGNOSIS — Z00.01 ENCOUNTER FOR WELL ADULT EXAM WITH ABNORMAL FINDINGS: Primary | ICD-10-CM

## 2023-10-05 DIAGNOSIS — Z12.5 PROSTATE CANCER SCREENING: ICD-10-CM

## 2023-10-05 DIAGNOSIS — M25.562 CHRONIC PAIN OF BOTH KNEES: ICD-10-CM

## 2023-10-05 DIAGNOSIS — G89.29 CHRONIC PAIN OF BOTH KNEES: ICD-10-CM

## 2023-10-05 DIAGNOSIS — M17.0 PRIMARY OSTEOARTHRITIS OF BOTH KNEES: ICD-10-CM

## 2023-10-05 DIAGNOSIS — R19.5 POSITIVE FIT (FECAL IMMUNOCHEMICAL TEST): ICD-10-CM

## 2023-10-05 DIAGNOSIS — M16.0 PRIMARY OSTEOARTHRITIS OF BOTH HIPS: ICD-10-CM

## 2023-10-05 DIAGNOSIS — H53.9 ABNORMAL VISION: ICD-10-CM

## 2023-10-05 DIAGNOSIS — I10 ESSENTIAL HYPERTENSION: ICD-10-CM

## 2023-10-05 DIAGNOSIS — J44.89 COPD WITH ASTHMA: ICD-10-CM

## 2023-10-05 DIAGNOSIS — F10.20 UNCOMPLICATED ALCOHOL DEPENDENCE (HCC): ICD-10-CM

## 2023-10-05 DIAGNOSIS — M25.561 CHRONIC PAIN OF BOTH KNEES: ICD-10-CM

## 2023-10-05 DIAGNOSIS — R73.9 HYPERGLYCEMIA: ICD-10-CM

## 2023-10-05 DIAGNOSIS — Z23 ENCOUNTER FOR IMMUNIZATION: ICD-10-CM

## 2023-10-05 RX ORDER — BNT162B2 0.23 MG/2.25ML
0.3 INJECTION, SUSPENSION INTRAMUSCULAR ONCE
Qty: 0.3 ML | Refills: 0 | Status: SHIPPED | OUTPATIENT
Start: 2023-10-05 | End: 2023-10-05

## 2023-10-05 SDOH — ECONOMIC STABILITY: FOOD INSECURITY: WITHIN THE PAST 12 MONTHS, THE FOOD YOU BOUGHT JUST DIDN'T LAST AND YOU DIDN'T HAVE MONEY TO GET MORE.: NEVER TRUE

## 2023-10-05 SDOH — ECONOMIC STABILITY: INCOME INSECURITY: HOW HARD IS IT FOR YOU TO PAY FOR THE VERY BASICS LIKE FOOD, HOUSING, MEDICAL CARE, AND HEATING?: NOT HARD AT ALL

## 2023-10-05 SDOH — ECONOMIC STABILITY: FOOD INSECURITY: WITHIN THE PAST 12 MONTHS, YOU WORRIED THAT YOUR FOOD WOULD RUN OUT BEFORE YOU GOT MONEY TO BUY MORE.: NEVER TRUE

## 2023-10-05 ASSESSMENT — PATIENT HEALTH QUESTIONNAIRE - PHQ9
2. FEELING DOWN, DEPRESSED OR HOPELESS: 0
SUM OF ALL RESPONSES TO PHQ QUESTIONS 1-9: 0
1. LITTLE INTEREST OR PLEASURE IN DOING THINGS: 0
SUM OF ALL RESPONSES TO PHQ9 QUESTIONS 1 & 2: 0
SUM OF ALL RESPONSES TO PHQ QUESTIONS 1-9: 0

## 2023-10-05 ASSESSMENT — ENCOUNTER SYMPTOMS
ABDOMINAL DISTENTION: 0
COUGH: 1
BACK PAIN: 0
ABDOMINAL PAIN: 0
DIARRHEA: 0
CHEST TIGHTNESS: 0
NAUSEA: 0
VOMITING: 0
SHORTNESS OF BREATH: 0
CONSTIPATION: 0
WHEEZING: 1

## 2023-10-05 NOTE — PATIENT INSTRUCTIONS
average (assuming 20 cigarettes per pack) you have smoked by how many years you have smoked. For example: If you smoked 1 pack a day for 20 years, that's 1 times 20. So you have a smoking history of 20 pack years. If you smoked 2 packs a day for 10 years, that's 2 times 10. So you have a smoking history of 20 pack years. Experts agree that screening is for people who have a high risk of lung cancer. But experts don't agree on what high risk means. Some say people age 48 or older with at least a 20-pack-year smoking history are high risk. Others say it's people age 54 or older with a 30-pack-year history. To see if you could benefit from screening, first find out if you are at high risk for lung cancer. Your doctor can help you decide your lung cancer risk. What are the risks of screening? CT screening for lung cancer isn't perfect. It can show an abnormal result when it turns out there wasn't any cancer. This is called a false-positive result. This means you may need more tests to make sure you don't have cancer. These tests can be harmful and cause a lot of worry. These tests may include more CT scans and invasive testing like a lung biopsy. In a biopsy, the doctor takes a sample of tissue from inside your lung so it can be looked at under a microscope. A biopsy is the only way to tell if you have lung cancer. If the biopsy finds cancer, you and your doctor will have to decide how or whether to treat it. Some lung cancers found on CT scans are harmless and would not have caused a problem if they had not been found through screening. But because doctors can't tell which ones will turn out to be harmless, most will be treated. This means that you may get treatment--including surgery, radiation, or chemotherapy--that you don't need. There is a risk of damage to cells or tissue from being exposed to radiation, including the small amounts used in CTs, X-rays, and other medical tests.  Over time, exposure to

## 2023-10-06 ENCOUNTER — TELEPHONE (OUTPATIENT)
Dept: GASTROENTEROLOGY | Age: 58
End: 2023-10-06

## 2023-10-09 DIAGNOSIS — L74.513 HYPERHIDROSIS OF SOLES: ICD-10-CM

## 2023-10-09 NOTE — TELEPHONE ENCOUNTER
Next Visit Date:  Visit date not found   Last Visit Date: 1/30/2023    Hemoglobin A1C (%)   Date Value   03/24/2021 4.8             ( goal A1C is < 7)   BP Readings from Last 3 Encounters:   10/05/23 138/80   03/08/23 (!) 130/90   02/22/23 (!) 140/82          (goal 120/80)  BUN   Date Value Ref Range Status   10/13/2022 12 6 - 20 mg/dL Final     Creatinine   Date Value Ref Range Status   10/13/2022 0.84 0.70 - 1.20 mg/dL Final     Potassium   Date Value Ref Range Status   10/13/2022 4.3 3.7 - 5.3 mmol/L Final

## 2023-10-11 ENCOUNTER — HOSPITAL ENCOUNTER (OUTPATIENT)
Age: 58
Discharge: HOME | End: 2023-10-11
Payer: COMMERCIAL

## 2023-10-11 DIAGNOSIS — Z11.59 ENCOUNTER FOR SCREENING FOR OTHER VIRAL DISEASES: ICD-10-CM

## 2023-10-11 DIAGNOSIS — R73.9 HYPERGLYCEMIA: ICD-10-CM

## 2023-10-11 DIAGNOSIS — I10 ESSENTIAL HYPERTENSION: ICD-10-CM

## 2023-10-11 DIAGNOSIS — Z12.5 PROSTATE CANCER SCREENING: ICD-10-CM

## 2023-10-11 LAB
ALBUMIN SERPL-MCNC: 4.1 G/DL (ref 3.5–5.2)
ALP SERPL-CCNC: 116 U/L (ref 40–129)
ALT SERPL-CCNC: 27 U/L (ref 5–41)
ANION GAP SERPL CALCULATED.3IONS-SCNC: 12 MMOL/L (ref 9–17)
AST SERPL-CCNC: 24 U/L
BILIRUB SERPL-MCNC: 0.4 MG/DL (ref 0.3–1.2)
BILIRUB UR QL STRIP: NEGATIVE
BUN SERPL-MCNC: 16 MG/DL (ref 6–20)
CALCIUM SERPL-MCNC: 9.1 MG/DL (ref 8.6–10.4)
CHLORIDE SERPL-SCNC: 103 MMOL/L (ref 98–107)
CLARITY UR: CLEAR
CO2 SERPL-SCNC: 24 MMOL/L (ref 20–31)
COLOR UR: YELLOW
COMMENT: NORMAL
CREAT SERPL-MCNC: 0.9 MG/DL (ref 0.7–1.2)
ERYTHROCYTE [DISTWIDTH] IN BLOOD BY AUTOMATED COUNT: 14.1 % (ref 11.5–14.9)
EST. AVERAGE GLUCOSE BLD GHB EST-MCNC: 82 MG/DL
GFR SERPL CREATININE-BSD FRML MDRD: >60 ML/MIN/1.73M2
GLUCOSE SERPL-MCNC: 91 MG/DL (ref 70–99)
GLUCOSE UR STRIP-MCNC: NEGATIVE MG/DL
HBA1C MFR BLD: 4.5 % (ref 4–6)
HBV SURFACE AB SERPL IA-ACNC: 5.29 MIU/ML
HCT VFR BLD AUTO: 42.5 % (ref 41–53)
HGB BLD-MCNC: 14.4 G/DL (ref 13.5–17.5)
HGB UR QL STRIP.AUTO: NEGATIVE
KETONES UR STRIP-MCNC: NEGATIVE MG/DL
LEUKOCYTE ESTERASE UR QL STRIP: NEGATIVE
MAGNESIUM SERPL-MCNC: 2.2 MG/DL (ref 1.6–2.6)
MCH RBC QN AUTO: 30.4 PG (ref 26–34)
MCHC RBC AUTO-ENTMCNC: 33.8 G/DL (ref 31–37)
MCV RBC AUTO: 89.8 FL (ref 80–100)
NITRITE UR QL STRIP: NEGATIVE
PH UR STRIP: 6 [PH] (ref 5–8)
PLATELET # BLD AUTO: 246 K/UL (ref 150–450)
PMV BLD AUTO: 7.3 FL (ref 6–12)
POTASSIUM SERPL-SCNC: 4 MMOL/L (ref 3.7–5.3)
PROT SERPL-MCNC: 7.1 G/DL (ref 6.4–8.3)
PROT UR STRIP-MCNC: NEGATIVE MG/DL
PSA SERPL-MCNC: 0.65 NG/ML
RBC # BLD AUTO: 4.73 M/UL (ref 4.5–5.9)
SODIUM SERPL-SCNC: 139 MMOL/L (ref 135–144)
SP GR UR STRIP: 1.02 (ref 1–1.03)
TSH SERPL DL<=0.05 MIU/L-ACNC: 1.8 UIU/ML (ref 0.3–5)
URATE SERPL-MCNC: 4.9 MG/DL (ref 3.4–7)
UROBILINOGEN UR STRIP-ACNC: NORMAL EU/DL (ref 0–1)
WBC OTHER # BLD: 6 K/UL (ref 3.5–11)

## 2023-10-11 PROCEDURE — 84550 ASSAY OF BLOOD/URIC ACID: CPT

## 2023-10-11 PROCEDURE — 86317 IMMUNOASSAY INFECTIOUS AGENT: CPT

## 2023-10-11 PROCEDURE — 84443 ASSAY THYROID STIM HORMONE: CPT

## 2023-10-11 PROCEDURE — G0103 PSA SCREENING: HCPCS

## 2023-10-11 PROCEDURE — 83735 ASSAY OF MAGNESIUM: CPT

## 2023-10-11 PROCEDURE — 80053 COMPREHEN METABOLIC PANEL: CPT

## 2023-10-11 PROCEDURE — 83036 HEMOGLOBIN GLYCOSYLATED A1C: CPT

## 2023-10-11 PROCEDURE — 85027 COMPLETE CBC AUTOMATED: CPT

## 2023-10-11 PROCEDURE — 36415 COLL VENOUS BLD VENIPUNCTURE: CPT

## 2023-10-11 PROCEDURE — 81003 URINALYSIS AUTO W/O SCOPE: CPT

## 2023-10-12 ENCOUNTER — TELEPHONE (OUTPATIENT)
Dept: FAMILY MEDICINE CLINIC | Age: 58
End: 2023-10-12

## 2023-10-12 NOTE — RESULT ENCOUNTER NOTE
Please notify patient: Not immune against hepatitis B, if he does need hepatitis B vaccine, can do it at the pharmacy    Otherwise labs within normal limits  continue current treatment    Future Appointments  4/5/2024   3:30 PM    [facility]  10/9/2024  4:00 PM    MD yaima Samaniego

## 2023-11-02 ENCOUNTER — HOSPITAL ENCOUNTER (OUTPATIENT)
Dept: CT IMAGING | Age: 58
Discharge: HOME | End: 2023-11-04
Attending: FAMILY MEDICINE
Payer: COMMERCIAL

## 2023-11-02 VITALS — WEIGHT: 207 LBS | BODY MASS INDEX: 28.98 KG/M2 | HEIGHT: 71 IN

## 2023-11-02 DIAGNOSIS — Z87.891 PERSONAL HISTORY OF TOBACCO USE: ICD-10-CM

## 2023-11-02 PROCEDURE — 71271 CT THORAX LUNG CANCER SCR C-: CPT

## 2023-11-04 NOTE — RESULT ENCOUNTER NOTE
Please notify patient:  Normal CT lung, repeat in 1 year  Future Appointments  4/5/2024   3:30 PM    Caty Perez MD    fp [facility]               [facility]  10/9/2024  4:00 PM    MD yaima Castanon

## 2023-11-05 DIAGNOSIS — K21.9 GASTROESOPHAGEAL REFLUX DISEASE WITHOUT ESOPHAGITIS: ICD-10-CM

## 2023-11-05 DIAGNOSIS — I10 ESSENTIAL HYPERTENSION: ICD-10-CM

## 2023-11-06 RX ORDER — AMLODIPINE BESYLATE 10 MG/1
10 TABLET ORAL DAILY
Qty: 90 TABLET | Refills: 3 | Status: SHIPPED | OUTPATIENT
Start: 2023-11-06

## 2023-11-06 RX ORDER — PANTOPRAZOLE SODIUM 40 MG/1
40 TABLET, DELAYED RELEASE ORAL DAILY
Qty: 90 TABLET | Refills: 3 | Status: SHIPPED | OUTPATIENT
Start: 2023-11-06

## 2023-12-01 DIAGNOSIS — J44.89 COPD WITH ASTHMA: ICD-10-CM

## 2023-12-01 RX ORDER — FLUTICASONE PROPIONATE AND SALMETEROL 500; 50 UG/1; UG/1
1 POWDER RESPIRATORY (INHALATION) 2 TIMES DAILY
Qty: 60 EACH | Refills: 3 | Status: CANCELLED | OUTPATIENT
Start: 2023-12-01

## 2023-12-01 RX ORDER — FLUTICASONE PROPIONATE AND SALMETEROL XINAFOATE 230; 21 UG/1; UG/1
2 AEROSOL, METERED RESPIRATORY (INHALATION) 2 TIMES DAILY
Qty: 12 G | Refills: 5 | Status: SHIPPED | OUTPATIENT
Start: 2023-12-01

## 2023-12-01 RX ORDER — ALBUTEROL SULFATE 90 UG/1
AEROSOL, METERED RESPIRATORY (INHALATION)
Qty: 18 G | Refills: 2 | Status: SHIPPED | OUTPATIENT
Start: 2023-12-01

## 2023-12-01 NOTE — TELEPHONE ENCOUNTER
Please Approve or Refuse.   Send to Pharmacy per Pt's Request: [facility]     Next Visit Date:  4/5/2024   Last Visit Date: 10/5/2023    Hemoglobin A1C (%)   Date Value   10/11/2023 4.5   03/24/2021 4.8             ( goal A1C is < 7)   BP Readings from Last 3 Encounters:   10/05/23 138/80   03/08/23 (!) 130/90   02/22/23 (!) 140/82          (goal 120/80)  BUN   Date Value Ref Range Status   10/11/2023 16 6 - 20 mg/dL Final     Creatinine   Date Value Ref Range Status   10/11/2023 0.9 0.7 - 1.2 mg/dL Final     Potassium   Date Value Ref Range Status   10/11/2023 4.0 3.7 - 5.3 mmol/L Final

## 2024-01-13 DIAGNOSIS — J44.89 COPD WITH ASTHMA: ICD-10-CM

## 2024-01-15 RX ORDER — FLUTICASONE PROPIONATE AND SALMETEROL 50; 500 UG/1; UG/1
1 POWDER RESPIRATORY (INHALATION) 2 TIMES DAILY
Qty: 60 EACH | Refills: 5 | Status: SHIPPED | OUTPATIENT
Start: 2024-01-15

## 2024-01-15 NOTE — TELEPHONE ENCOUNTER
Please Approve or Refuse.  Send to Pharmacy per Pt's Request:      Next Visit Date:  4/5/2024   Last Visit Date: 10/5/2023    Hemoglobin A1C (%)   Date Value   10/11/2023 4.5   03/24/2021 4.8             ( goal A1C is < 7)   BP Readings from Last 3 Encounters:   12/19/23 133/77   10/05/23 138/80   03/08/23 (!) 130/90          (goal 120/80)  BUN   Date Value Ref Range Status   10/11/2023 16 6 - 20 mg/dL Final     Creatinine   Date Value Ref Range Status   10/11/2023 0.9 0.7 - 1.2 mg/dL Final     Potassium   Date Value Ref Range Status   10/11/2023 4.0 3.7 - 5.3 mmol/L Final

## 2024-03-19 ENCOUNTER — OFFICE VISIT (OUTPATIENT)
Dept: PODIATRY | Age: 59
End: 2024-03-19
Payer: COMMERCIAL

## 2024-03-19 VITALS — BODY MASS INDEX: 28.98 KG/M2 | WEIGHT: 207 LBS | HEIGHT: 71 IN

## 2024-03-19 DIAGNOSIS — L98.9 BENIGN SKIN LESION: Primary | ICD-10-CM

## 2024-03-19 DIAGNOSIS — M79.671 PAIN IN RIGHT FOOT: ICD-10-CM

## 2024-03-19 PROCEDURE — 99999 PR OFFICE/OUTPT VISIT,PROCEDURE ONLY: CPT | Performed by: PODIATRIST

## 2024-03-19 PROCEDURE — 17110 DESTRUCTION B9 LES UP TO 14: CPT | Performed by: PODIATRIST

## 2024-03-21 ASSESSMENT — ENCOUNTER SYMPTOMS
COLOR CHANGE: 0
BACK PAIN: 0
NAUSEA: 0
SHORTNESS OF BREATH: 0
DIARRHEA: 0

## 2024-03-21 NOTE — PROGRESS NOTES
[facility] Pod[facility]  Return Patient Progress Note    Subjective: Anastacio Dejesus 58 y.o. male that presents for follow up evaluation of painful calluses to the right foot.  Chief Complaint   Patient presents with    Callouses     Callous trim right foot/last saw Anh Castorena CNP 12/19/23    Patient's treatment thus far has included debridement.  Pain is rated 8 out of 10 and is described as intermittent. Patient has been following my prescribed course of therapy as instructed.     Review of Systems   Constitutional:  Negative for activity change, appetite change, chills, diaphoresis, fatigue and fever.   Respiratory:  Negative for shortness of breath.    Cardiovascular:  Negative for leg swelling.   Gastrointestinal:  Negative for diarrhea and nausea.   Endocrine: Negative for cold intolerance, heat intolerance and polyuria.   Musculoskeletal:  Positive for arthralgias. Negative for back pain, gait problem, joint swelling and myalgias.   Skin:  Negative for color change, pallor, rash and wound.   Allergic/Immunologic: Negative for environmental allergies and food allergies.   Neurological:  Negative for dizziness, weakness, light-headedness and numbness.   Hematological:  Does not bruise/bleed easily.   Psychiatric/Behavioral:  Negative for behavioral problems, confusion and self-injury. The patient is not nervous/anxious.        Objective: Clinical evaluation of the patient reveals hyperkeratotic tissue formation submetatarsal head 1 and 5 of the right foot. There is pain with direct palpation of the lesion(s). Debridement of the lesion(s) with a fifteen blade does reveal a central core. The core of the lesion(s) was debrided with a fifteen blade. No signs of bacterial infection are noted to the lesion(s).    Assessment:    Diagnosis Orders   1. Benign skin lesion  IA DESTRUCTION BENIGN LESIONS UP TO 14      2. Pain in right foot  IA DESTRUCTION BENIGN LESIONS UP TO 14            Plan: 1. Clinical

## 2024-04-01 DIAGNOSIS — J44.89 COPD WITH ASTHMA (HCC): ICD-10-CM

## 2024-04-01 RX ORDER — ALBUTEROL SULFATE 90 UG/1
AEROSOL, METERED RESPIRATORY (INHALATION)
Qty: 18 G | Refills: 2 | Status: SHIPPED | OUTPATIENT
Start: 2024-04-01

## 2024-04-01 NOTE — TELEPHONE ENCOUNTER
Noted. Thank you!    Future Appointments   Date Time Provider Department Center   10/9/2024  4:00 PM Vi Mccarty MD fp [facility]

## 2024-04-01 NOTE — TELEPHONE ENCOUNTER
Please Approve or Refuse.  Send to Pharmacy per Pt's Request:      Next Visit Date:  Visit date not found   Last Visit Date: 10/5/2023    Hemoglobin A1C (%)   Date Value   10/11/2023 4.5   03/24/2021 4.8             ( goal A1C is < 7)   BP Readings from Last 3 Encounters:   12/19/23 133/77   10/05/23 138/80   03/08/23 (!) 130/90          (goal 120/80)  BUN   Date Value Ref Range Status   10/11/2023 16 6 - 20 mg/dL Final     Creatinine   Date Value Ref Range Status   10/11/2023 0.9 0.7 - 1.2 mg/dL Final     Potassium   Date Value Ref Range Status   10/11/2023 4.0 3.7 - 5.3 mmol/L Final

## 2024-04-14 ENCOUNTER — TELEPHONE (OUTPATIENT)
Dept: FAMILY MEDICINE CLINIC | Age: 59
End: 2024-04-14

## 2024-04-14 DIAGNOSIS — J44.89 COPD WITH ASTHMA (HCC): ICD-10-CM

## 2024-04-14 RX ORDER — ALBUTEROL SULFATE 90 UG/1
AEROSOL, METERED RESPIRATORY (INHALATION)
Qty: 18 G | Refills: 2 | Status: SHIPPED | OUTPATIENT
Start: 2024-04-14

## 2024-05-12 DIAGNOSIS — L74.513 HYPERHIDROSIS OF SOLES: ICD-10-CM

## 2024-05-13 NOTE — TELEPHONE ENCOUNTER
Please Approve or Refuse.       Next Visit Date:  Visit date not found   Last Visit Date: 3/19/2024    Hemoglobin A1C (%)   Date Value   10/11/2023 4.5   03/24/2021 4.8             ( goal A1C is < 7)   BP Readings from Last 3 Encounters:   12/19/23 133/77   10/05/23 138/80   03/08/23 (!) 130/90          (goal 120/80)  BUN   Date Value Ref Range Status   10/11/2023 16 6 - 20 mg/dL Final     Creatinine   Date Value Ref Range Status   10/11/2023 0.9 0.7 - 1.2 mg/dL Final     Potassium   Date Value Ref Range Status   10/11/2023 4.0 3.7 - 5.3 mmol/L Final

## 2024-07-28 NOTE — TELEPHONE ENCOUNTER
[address]  ED Follow up Call            FU appts/Provider:    Future Appointments   Date Time Provider Sha Charlotet   1/7/2022  3:45 PM [facility]       VOICEMAIL DOCUMENTATION - ERASE IF NOT USED  Hi, this message is for  Taylor Garcia  This is Karrie Fields from [address] [place] [address] office. Just calling to see how you are doing after your recent visit to the Emergency Room. [address] [place] [address] wants to make sure you were able to fill any prescriptions and that you understand your discharge instructions. Please return our call if you need to make a follow up appointment with your provider or have any further needs. Our phone number is [phone]. Have a great day. Continue p.o. iron

## 2024-08-30 ENCOUNTER — OFFICE VISIT (OUTPATIENT)
Dept: FAMILY MEDICINE CLINIC | Age: 59
End: 2024-08-30

## 2024-08-30 DIAGNOSIS — Z13.6 SCREENING FOR CARDIOVASCULAR CONDITION: ICD-10-CM

## 2024-08-30 DIAGNOSIS — J45.901 ASTHMA EXACERBATION IN COPD (HCC): Primary | ICD-10-CM

## 2024-08-30 DIAGNOSIS — J20.9 ACUTE BRONCHITIS DUE TO INFECTION: ICD-10-CM

## 2024-08-30 DIAGNOSIS — R19.5 POSITIVE FIT (FECAL IMMUNOCHEMICAL TEST): ICD-10-CM

## 2024-08-30 DIAGNOSIS — G89.29 CHRONIC MIDLINE LOW BACK PAIN WITHOUT SCIATICA: ICD-10-CM

## 2024-08-30 DIAGNOSIS — F10.20 UNCOMPLICATED ALCOHOL DEPENDENCE (HCC): ICD-10-CM

## 2024-08-30 DIAGNOSIS — M51.37 DDD (DEGENERATIVE DISC DISEASE), LUMBOSACRAL: ICD-10-CM

## 2024-08-30 DIAGNOSIS — Z23 ENCOUNTER FOR IMMUNIZATION: ICD-10-CM

## 2024-08-30 DIAGNOSIS — M17.0 PRIMARY OSTEOARTHRITIS OF BOTH KNEES: ICD-10-CM

## 2024-08-30 DIAGNOSIS — M25.562 CHRONIC PAIN OF BOTH KNEES: ICD-10-CM

## 2024-08-30 DIAGNOSIS — J44.1 ASTHMA EXACERBATION IN COPD (HCC): Primary | ICD-10-CM

## 2024-08-30 DIAGNOSIS — G89.29 CHRONIC PAIN OF BOTH KNEES: ICD-10-CM

## 2024-08-30 DIAGNOSIS — I10 ESSENTIAL HYPERTENSION: ICD-10-CM

## 2024-08-30 DIAGNOSIS — M54.50 CHRONIC MIDLINE LOW BACK PAIN WITHOUT SCIATICA: ICD-10-CM

## 2024-08-30 DIAGNOSIS — M25.561 CHRONIC PAIN OF BOTH KNEES: ICD-10-CM

## 2024-08-30 RX ORDER — FLUTICASONE PROPIONATE AND SALMETEROL 500; 50 UG/1; UG/1
1 POWDER RESPIRATORY (INHALATION) 2 TIMES DAILY
Qty: 60 EACH | Refills: 5 | Status: SHIPPED | OUTPATIENT
Start: 2024-08-30

## 2024-08-30 RX ORDER — AMLODIPINE BESYLATE 10 MG/1
10 TABLET ORAL DAILY
Qty: 90 TABLET | Refills: 3 | Status: SHIPPED | OUTPATIENT
Start: 2024-08-30

## 2024-08-30 RX ORDER — ALBUTEROL SULFATE 90 UG/1
AEROSOL, METERED RESPIRATORY (INHALATION)
Qty: 18 G | Refills: 2 | Status: SHIPPED | OUTPATIENT
Start: 2024-08-30

## 2024-08-30 RX ORDER — IPRATROPIUM BROMIDE AND ALBUTEROL SULFATE 2.5; .5 MG/3ML; MG/3ML
1 SOLUTION RESPIRATORY (INHALATION) EVERY 6 HOURS PRN
Qty: 360 ML | Refills: 3 | Status: SHIPPED | OUTPATIENT
Start: 2024-08-30

## 2024-08-30 RX ORDER — CYCLOBENZAPRINE HCL 10 MG
10 TABLET ORAL NIGHTLY PRN
Qty: 90 TABLET | Refills: 0 | Status: SHIPPED | OUTPATIENT
Start: 2024-08-30

## 2024-08-30 RX ORDER — AZITHROMYCIN 250 MG/1
TABLET, FILM COATED ORAL
Qty: 6 TABLET | Refills: 0 | Status: SHIPPED | OUTPATIENT
Start: 2024-08-30 | End: 2024-09-04

## 2024-08-30 RX ORDER — PREDNISONE 10 MG/1
50 TABLET ORAL DAILY
Qty: 35 TABLET | Refills: 0 | Status: SHIPPED | OUTPATIENT
Start: 2024-08-30 | End: 2024-09-06

## 2024-08-30 ASSESSMENT — PATIENT HEALTH QUESTIONNAIRE - PHQ9
SUM OF ALL RESPONSES TO PHQ QUESTIONS 1-9: 0
2. FEELING DOWN, DEPRESSED OR HOPELESS: NOT AT ALL
1. LITTLE INTEREST OR PLEASURE IN DOING THINGS: NOT AT ALL
SUM OF ALL RESPONSES TO PHQ QUESTIONS 1-9: 0
SUM OF ALL RESPONSES TO PHQ9 QUESTIONS 1 & 2: 0

## 2024-08-30 ASSESSMENT — ENCOUNTER SYMPTOMS
SHORTNESS OF BREATH: 1
ABDOMINAL PAIN: 0
DIARRHEA: 0
COUGH: 1
CONSTIPATION: 0
WHEEZING: 1
BACK PAIN: 0
CHEST TIGHTNESS: 0
ABDOMINAL DISTENTION: 0
VOMITING: 0
NAUSEA: 0

## 2024-08-30 NOTE — PROGRESS NOTES
Visit Information    Have you changed or started any medications since your last visit including any over-the-counter medicines, vitamins, or herbal medicines? yes -    Have you stopped taking any of your medications? Is so, why? -  no  Are you having any side effects from any of your medications? - no    Have you seen any other physician or provider since your last visit?  yes -     Have you had any other diagnostic tests since your last visit?  no   Have you been seen in the emergency room and/or had an admission in a hospital since we last saw you?  no   Have you had your routine dental cleaning in the past 6 months?  no     Do you have an active MyChart account? If no, what is the barrier?  Yes    Patient Care Team:  Vi Mccarty MD as PCP - General (Family Medicine)  Vi Mccarty MD as PCP - Empaneled Provider  Memo Dong DPM as Consulting Physician (Podiatry)  Anastacio Cavazos DO as Consulting Physician (Orthopedic Surgery)    Medical History Review  Past Medical, Family, and Social History reviewed and does contribute to the patient presenting condition    Health Maintenance   Topic Date Due    Colorectal Cancer Screen  03/01/2023    Flu vaccine (1) 08/01/2024    Depression Screen  10/05/2024    Lung Cancer Screening &/or Counseling  11/02/2024    Lipids  10/13/2027    DTaP/Tdap/Td vaccine (2 - Td or Tdap) 08/18/2031    Hepatitis B vaccine  Completed    Shingles vaccine  Completed    Pneumococcal 0-64 years Vaccine  Completed    COVID-19 Vaccine  Completed    Hepatitis C screen  Completed    HIV screen  Completed    Hepatitis A vaccine  Aged Out    Hib vaccine  Aged Out    Polio vaccine  Aged Out    Meningococcal (ACWY) vaccine  Aged Out    Diabetes screen  Discontinued    Prostate Specific Antigen (PSA) Screening or Monitoring  Discontinued               
10/11/2023 NEGATIVE  NEGATIVE Final    pH, UA 10/11/2023 6.0  5.0 - 8.0 Final    Protein, UA 10/11/2023 NEGATIVE  NEGATIVE mg/dL Final    Urobilinogen, Urine 10/11/2023 Normal  0.0 - 1.0 EU/dL Final    Nitrite, Urine 10/11/2023 NEGATIVE  NEGATIVE Final    Leukocyte Esterase, Urine 10/11/2023 NEGATIVE  NEGATIVE Final    Comment 10/11/2023 Microscopic exam not performed based on chemical results unless requested in original order.   Final         Lab Results   Component Value Date    CHOL 183 10/13/2022    CHOL 174 09/15/2021    CHOL 194 02/26/2020     Lab Results   Component Value Date    TRIG 27 10/13/2022    TRIG 35 09/15/2021    TRIG 43 02/26/2020     Lab Results   Component Value Date     10/13/2022     09/15/2021     02/26/2020     Lab Results   Component Value Date    LDL 63 10/13/2022     Lab Results   Component Value Date    PSA 0.65 10/11/2023         Lab Results   Component Value Date    CHOLHDLRATIO 1.6 10/13/2022    CHOLHDLRATIO 1.7 09/15/2021    CHOLHDLRATIO 1.7 02/26/2020       Lab Results   Component Value Date    XNTDOHFT88 426 02/26/2020     Lab Results   Component Value Date    FOLATE 10.1 02/26/2020     No results found for: \"VITD25\"        On this date 8/30/2024 I have spent 39 minutes reviewing previous notes, test results and face to face with the patient discussing the diagnosis and importance of compliance with the treatment plan as well as documenting on the day of the visit.     This note was completed by using the assistance of a speech-recognition program. However, inadvertent computerized transcription errors may be present. Although every effort was made to ensure accuracy, no guarantees can be provided that every mistake has been identified and corrected by editing.    An electronic signature was used to authenticate this note.  Electronically signed by Vi Mccarty MD on 9/1/2024 at 11:12 PM

## 2024-09-01 VITALS
WEIGHT: 200 LBS | OXYGEN SATURATION: 95 % | TEMPERATURE: 98 F | HEART RATE: 80 BPM | BODY MASS INDEX: 28 KG/M2 | SYSTOLIC BLOOD PRESSURE: 108 MMHG | DIASTOLIC BLOOD PRESSURE: 70 MMHG | HEIGHT: 71 IN

## 2024-09-01 PROBLEM — Z13.6 SCREENING FOR CARDIOVASCULAR CONDITION: Status: ACTIVE | Noted: 2024-09-01

## 2024-09-01 PROBLEM — E78.2 MIXED HYPERLIPIDEMIA: Status: ACTIVE | Noted: 2024-09-01

## 2024-09-04 ENCOUNTER — TELEPHONE (OUTPATIENT)
Dept: FAMILY MEDICINE CLINIC | Age: 59
End: 2024-09-04

## 2024-09-04 NOTE — TELEPHONE ENCOUNTER
I received an order for CPAP, I have never ordered the CPAP for him, he needs to see pulmonologist, then I will sign the refill of his supplies    Is he currently seeing any pulmonologist?  There is nothing in his chart    Future Appointments   Date Time Provider Department Center   9/16/2024  4:00 PM Memo Dong DPM [facility]   10/9/2024  4:00 PM Vi Mccarty MD fp sc [facility]

## 2024-09-05 ENCOUNTER — TELEPHONE (OUTPATIENT)
Dept: FAMILY MEDICINE CLINIC | Age: 59
End: 2024-09-05

## 2024-09-05 NOTE — TELEPHONE ENCOUNTER
Patient called in to see if his paperwork is done for his job. Patient stated that the paperwork needs to be turned in by Monday or he's out of a job       Please advise

## 2024-09-05 NOTE — TELEPHONE ENCOUNTER
Noted. Thank you!  I redid the forms, please let him know to come and   Future Appointments   Date Time Provider Department Center   9/16/2024  4:00 PM Memo Dong DPM [facility]   10/9/2024  4:00 PM Vi Mccarty MD fp sc [facility]

## 2024-09-05 NOTE — TELEPHONE ENCOUNTER
Noted. Thank you!  I gave it to Jeanette to clarify, he also needs to write down his limitations on his own form attached to the main form that he gave me    Future Appointments   Date Time Provider Department Center   9/16/2024  4:00 PM Memo Dong DPM [facility]   10/9/2024  4:00 PM Vi Mccarty MD fp sc [facility]

## 2024-09-05 NOTE — TELEPHONE ENCOUNTER
Called out to pt and stated he does not need a cpap machine he never used one or he was ever aware of this. He stated his son is the one who needs one and is in the process of getting one. He stated to call his son in regard to this.

## 2024-09-05 NOTE — TELEPHONE ENCOUNTER
Spoke to patient and he states he needs it to say no work restrictions, no chronic back pain (he's not sure why it was on the paperwork) and he can lift and carry up to 100 lbs. He said just go off the little yellow post it note.

## 2024-09-16 ENCOUNTER — OFFICE VISIT (OUTPATIENT)
Dept: PODIATRY | Age: 59
End: 2024-09-16
Payer: COMMERCIAL

## 2024-09-16 VITALS — WEIGHT: 200 LBS | BODY MASS INDEX: 28 KG/M2 | HEIGHT: 71 IN

## 2024-09-16 DIAGNOSIS — L98.9 BENIGN SKIN LESION: Primary | ICD-10-CM

## 2024-09-16 DIAGNOSIS — M79.671 PAIN IN RIGHT FOOT: ICD-10-CM

## 2024-09-16 PROCEDURE — 99999 PR OFFICE/OUTPT VISIT,PROCEDURE ONLY: CPT | Performed by: PODIATRIST

## 2024-09-16 PROCEDURE — 17110 DESTRUCTION B9 LES UP TO 14: CPT | Performed by: PODIATRIST

## 2024-09-17 ASSESSMENT — ENCOUNTER SYMPTOMS
BACK PAIN: 0
COLOR CHANGE: 0
SHORTNESS OF BREATH: 0
DIARRHEA: 0
NAUSEA: 0

## 2024-10-01 PROBLEM — Z13.6 SCREENING FOR CARDIOVASCULAR CONDITION: Status: RESOLVED | Noted: 2024-09-01 | Resolved: 2024-10-01

## 2024-10-06 DIAGNOSIS — K21.9 GASTROESOPHAGEAL REFLUX DISEASE WITHOUT ESOPHAGITIS: ICD-10-CM

## 2024-10-07 RX ORDER — PANTOPRAZOLE SODIUM 40 MG/1
40 TABLET, DELAYED RELEASE ORAL DAILY
Qty: 90 TABLET | Refills: 3 | Status: SHIPPED | OUTPATIENT
Start: 2024-10-07 | End: 2024-10-09 | Stop reason: SDUPTHER

## 2024-10-07 NOTE — TELEPHONE ENCOUNTER
Please Approve or Refuse.  Send to Pharmacy per Pt's Request: [facility]     Next Visit Date:  10/9/2024   Last Visit Date: 8/30/2024    Hemoglobin A1C (%)   Date Value   10/11/2023 4.5   03/24/2021 4.8             ( goal A1C is < 7)   BP Readings from Last 3 Encounters:   08/30/24 108/70   12/19/23 133/77   10/05/23 138/80          (goal 120/80)  BUN   Date Value Ref Range Status   10/11/2023 16 6 - 20 mg/dL Final     Creatinine   Date Value Ref Range Status   10/11/2023 0.9 0.7 - 1.2 mg/dL Final     Potassium   Date Value Ref Range Status   10/11/2023 4.0 3.7 - 5.3 mmol/L Final

## 2024-10-09 ENCOUNTER — OFFICE VISIT (OUTPATIENT)
Dept: FAMILY MEDICINE CLINIC | Age: 59
End: 2024-10-09
Payer: COMMERCIAL

## 2024-10-09 VITALS
WEIGHT: 199.6 LBS | HEIGHT: 71 IN | HEART RATE: 81 BPM | BODY MASS INDEX: 27.94 KG/M2 | SYSTOLIC BLOOD PRESSURE: 118 MMHG | DIASTOLIC BLOOD PRESSURE: 72 MMHG | TEMPERATURE: 97.6 F | OXYGEN SATURATION: 95 %

## 2024-10-09 DIAGNOSIS — J44.1 ASTHMA EXACERBATION IN COPD (HCC): ICD-10-CM

## 2024-10-09 DIAGNOSIS — R19.5 POSITIVE FIT (FECAL IMMUNOCHEMICAL TEST): ICD-10-CM

## 2024-10-09 DIAGNOSIS — I10 ESSENTIAL HYPERTENSION: ICD-10-CM

## 2024-10-09 DIAGNOSIS — K21.9 GASTROESOPHAGEAL REFLUX DISEASE WITHOUT ESOPHAGITIS: ICD-10-CM

## 2024-10-09 DIAGNOSIS — Z00.01 ENCOUNTER FOR WELL ADULT EXAM WITH ABNORMAL FINDINGS: Primary | ICD-10-CM

## 2024-10-09 DIAGNOSIS — Z12.5 PROSTATE CANCER SCREENING: ICD-10-CM

## 2024-10-09 DIAGNOSIS — Z13.6 SCREENING FOR CARDIOVASCULAR CONDITION: ICD-10-CM

## 2024-10-09 DIAGNOSIS — J20.9 ACUTE BRONCHITIS DUE TO INFECTION: ICD-10-CM

## 2024-10-09 DIAGNOSIS — Z53.20 COLONOSCOPY REFUSED: ICD-10-CM

## 2024-10-09 PROCEDURE — 99214 OFFICE O/P EST MOD 30 MIN: CPT | Performed by: FAMILY MEDICINE

## 2024-10-09 PROCEDURE — 99396 PREV VISIT EST AGE 40-64: CPT | Performed by: FAMILY MEDICINE

## 2024-10-09 PROCEDURE — 3078F DIAST BP <80 MM HG: CPT | Performed by: FAMILY MEDICINE

## 2024-10-09 PROCEDURE — 3074F SYST BP LT 130 MM HG: CPT | Performed by: FAMILY MEDICINE

## 2024-10-09 RX ORDER — LEVOFLOXACIN 750 MG/1
750 TABLET, FILM COATED ORAL DAILY
Qty: 5 TABLET | Refills: 0 | Status: SHIPPED | OUTPATIENT
Start: 2024-10-09 | End: 2024-10-14

## 2024-10-09 RX ORDER — PREDNISONE 10 MG/1
50 TABLET ORAL DAILY
Qty: 35 TABLET | Refills: 0 | Status: SHIPPED | OUTPATIENT
Start: 2024-10-09 | End: 2024-10-16

## 2024-10-09 RX ORDER — PANTOPRAZOLE SODIUM 40 MG/1
40 TABLET, DELAYED RELEASE ORAL DAILY
Qty: 90 TABLET | Refills: 3 | Status: SHIPPED | OUTPATIENT
Start: 2024-10-09

## 2024-10-09 SDOH — ECONOMIC STABILITY: FOOD INSECURITY: WITHIN THE PAST 12 MONTHS, THE FOOD YOU BOUGHT JUST DIDN'T LAST AND YOU DIDN'T HAVE MONEY TO GET MORE.: NEVER TRUE

## 2024-10-09 SDOH — ECONOMIC STABILITY: INCOME INSECURITY: HOW HARD IS IT FOR YOU TO PAY FOR THE VERY BASICS LIKE FOOD, HOUSING, MEDICAL CARE, AND HEATING?: NOT HARD AT ALL

## 2024-10-09 SDOH — ECONOMIC STABILITY: FOOD INSECURITY: WITHIN THE PAST 12 MONTHS, YOU WORRIED THAT YOUR FOOD WOULD RUN OUT BEFORE YOU GOT MONEY TO BUY MORE.: NEVER TRUE

## 2024-10-09 ASSESSMENT — ENCOUNTER SYMPTOMS
SINUS PAIN: 0
CHEST TIGHTNESS: 0
VOMITING: 0
DIARRHEA: 0
SINUS PRESSURE: 0
BACK PAIN: 0
CONSTIPATION: 0
ABDOMINAL PAIN: 0
ABDOMINAL DISTENTION: 0
RHINORRHEA: 1
NAUSEA: 0
SHORTNESS OF BREATH: 1
COUGH: 1
WHEEZING: 1

## 2024-10-09 ASSESSMENT — PATIENT HEALTH QUESTIONNAIRE - PHQ9
SUM OF ALL RESPONSES TO PHQ QUESTIONS 1-9: 0
SUM OF ALL RESPONSES TO PHQ QUESTIONS 1-9: 0
SUM OF ALL RESPONSES TO PHQ9 QUESTIONS 1 & 2: 0
SUM OF ALL RESPONSES TO PHQ QUESTIONS 1-9: 0
1. LITTLE INTEREST OR PLEASURE IN DOING THINGS: NOT AT ALL
2. FEELING DOWN, DEPRESSED OR HOPELESS: NOT AT ALL
SUM OF ALL RESPONSES TO PHQ QUESTIONS 1-9: 0

## 2024-10-09 NOTE — PROGRESS NOTES
Visit Information    Have you changed or started any medications since your last visit including any over-the-counter medicines, vitamins, or herbal medicines? no   Have you stopped taking any of your medications? Is so, why? -  no  Are you having any side effects from any of your medications? - no    Have you seen any other physician or provider since your last visit?  no   Have you had any other diagnostic tests since your last visit?  no   Have you been seen in the emergency room and/or had an admission in a hospital since we last saw you?  no   Have you had your routine dental cleaning in the past 6 months?  no     Do you have an active MyChart account? If no, what is the barrier?  yes    Patient Care Team:  Vi Mccarty MD as PCP - General (Family Medicine)  Vi Mccarty MD as PCP - Empaneled Provider  Memo Dong DPM as Consulting Physician (Podiatry)  Anastacio Cavazos DO as Consulting Physician (Orthopedic Surgery)    Medical History Review  Past Medical, Family, and Social History reviewed and does contribute to the patient presenting condition    Health Maintenance   Topic Date Due    Colorectal Cancer Screen  03/01/2023    Lung Cancer Screening &/or Counseling  11/02/2024    Depression Screen  08/30/2025    Lipids  10/13/2027    DTaP/Tdap/Td vaccine (2 - Td or Tdap) 08/18/2031    Hepatitis B vaccine  Completed    Flu vaccine  Completed    Shingles vaccine  Completed    Pneumococcal 0-64 years Vaccine  Completed    COVID-19 Vaccine  Completed    Hepatitis C screen  Completed    HIV screen  Completed    Hepatitis A vaccine  Aged Out    Hib vaccine  Aged Out    Polio vaccine  Aged Out    Meningococcal (ACWY) vaccine  Aged Out    Diabetes screen  Discontinued    Prostate Specific Antigen (PSA) Screening or Monitoring  Discontinued

## 2024-10-09 NOTE — PROGRESS NOTES
10/9/2024      Anastacio Dejesus (:  1965) is a 58 y.o. male, here for a preventive medicine evaluation, Annual Exam, Medication Refill, Cough (FINISHED ANTIBIOTICS GIVEN/STILL COUGHING UP GREEN MUCOUS ), Gastroesophageal Reflux, and Hypertension   .      ASSESSMENT/PLAN:    Assessment & Plan  Encounter for well adult exam with abnormal findings   Low carb, low fat diet, increase fruits and vegetables, and exercise 4-5 times a week 30-40 minutes a day, or walk 1-2 hours per day, or wear a pedometer and get at least 10,000 steps per day.    Dental exam 2-3 times /year advised.  Immunizations reviewed.    Health Maintenance reviewed   Smoking cessation counseling given, currently smoking half pack per day, advised to cut down to no more than 5 cigarettes/day.  He declines any medications.  I discussed with him to use the 1 800 quit now, line, he says he will contact them on his own.    To consider to also cut down on drinking, no more than 2 drinks on 1 occasion.  Currently drinking 4-8 drinks at 1 occasion  He did cut down on drinking during the weekdays  Annual eye exam advised.       Acute bronchitis due to infection   Acute condition, new, but recurrent  Counseling given to quit smoking while sick, he says he will try to cut down even more    Last visit on 2024 we gave him Z-Ricardo  He has multiple allergies and intolerances to antibiotics, including to penicillins, Bactrim, and doxycycline  Orders:    levoFLOXacin (LEVAQUIN) 750 MG tablet; Take 1 tablet by mouth daily for 5 days    predniSONE (DELTASONE) 10 MG tablet; Take 5 tablets by mouth daily for 7 days Avoid sweets, alcohol, and salt    [place] OFFICE/OUTPATIENT ESTABLISHED MOD MDM 30-39 MIN    Asthma exacerbation in COPD (HCC)   Chronic, worsening (exacerbation), to quit smoking while sick, to start nebulizer treatments at least twice a day, will give antibiotic and prednisone for acute bronchitis  Continue inhalers Advair discus 1 puff twice a

## 2024-10-09 NOTE — ASSESSMENT & PLAN NOTE
Improved with medication  Continue pantoprazole as needed I suggested to get EGD and colonoscopy, he declines at this time due to coverage and insurance not covering everything.  Risk of Herring's esophagus and esophageal cancer discussed  Patient says he gets a lot of acid reflux if not taking pantoprazole  Orders:    MD OFFICE/OUTPATIENT ESTABLISHED MOD MDM 30-39 MIN    pantoprazole (PROTONIX) 40 MG tablet; Take 1 tablet by mouth daily

## 2024-10-09 NOTE — ASSESSMENT & PLAN NOTE
Discussed with patient risk of colon cancer, and that he needs colonoscopy, he absolutely declines, he says he does not have good coverage from the insurance for colonoscopy but will consider at the next appointment, risk of colon cancer discussed    Orders:    [place] OFFICE/OUTPATIENT ESTABLISHED MOD MDM 30-39 MIN

## 2024-10-09 NOTE — ASSESSMENT & PLAN NOTE
Chronic, stable well-controlled  He did not complete the prior labs, he says he will do the labs in about 1 month due to no time for labs.  I did tell him in the hospital the open on Saturday mornings  Continue current treatment, amlodipine 10 Mg daily, low-salt diet    Orders:    [place] OFFICE/OUTPATIENT ESTABLISHED MOD MDM 30-39 MIN    CBC; Future    Comprehensive Metabolic Panel; Future    Urinalysis with Reflex to Culture; Future    Uric Acid; Future    TSH; Future

## 2025-02-13 ENCOUNTER — TELEPHONE (OUTPATIENT)
Dept: FAMILY MEDICINE CLINIC | Age: 60
End: 2025-02-13

## 2025-02-13 NOTE — TELEPHONE ENCOUNTER
Noted  Future Appointments   Date Time Provider Department Center   10/16/2025  4:00 PM Vi Mccarty MD fp sc [facility]

## 2025-02-13 NOTE — TELEPHONE ENCOUNTER
Anastacio Dejesus    Patient  Send Via  Email  Text  Mobile Phone  Send Link  Last text sent8:27 AM  To:[phone]    Jeanette says he was not in the office.  I did call him through [employer] at 8:48 AM, and he said he cannot click on the link, he was at work, and he has to reschedule

## 2025-02-14 NOTE — TELEPHONE ENCOUNTER
Noted appointment canceled  Future Appointments   Date Time Provider Department Center   10/16/2025  4:00 PM Vi Mccarty MD fp sc [facility]